# Patient Record
Sex: FEMALE | Race: WHITE | NOT HISPANIC OR LATINO | Employment: FULL TIME | URBAN - METROPOLITAN AREA
[De-identification: names, ages, dates, MRNs, and addresses within clinical notes are randomized per-mention and may not be internally consistent; named-entity substitution may affect disease eponyms.]

---

## 2018-11-19 ENCOUNTER — OFFICE VISIT (OUTPATIENT)
Dept: FAMILY MEDICINE | Facility: CLINIC | Age: 53
End: 2018-11-19
Payer: COMMERCIAL

## 2018-11-19 VITALS
BODY MASS INDEX: 32.3 KG/M2 | SYSTOLIC BLOOD PRESSURE: 112 MMHG | HEART RATE: 80 BPM | TEMPERATURE: 97.2 F | WEIGHT: 188.2 LBS | DIASTOLIC BLOOD PRESSURE: 82 MMHG

## 2018-11-19 DIAGNOSIS — G89.29 CHRONIC PAIN OF LEFT KNEE: Primary | ICD-10-CM

## 2018-11-19 DIAGNOSIS — L30.9 ECZEMA, UNSPECIFIED TYPE: ICD-10-CM

## 2018-11-19 DIAGNOSIS — Z87.74 S/P SURGERY FOR COMPLEX CONGENITAL HEART DISEASE: ICD-10-CM

## 2018-11-19 DIAGNOSIS — M25.562 CHRONIC PAIN OF LEFT KNEE: Primary | ICD-10-CM

## 2018-11-19 DIAGNOSIS — M79.89 LEG SWELLING: ICD-10-CM

## 2018-11-19 DIAGNOSIS — E05.90 HYPERTHYROIDISM: ICD-10-CM

## 2018-11-19 PROCEDURE — 36415 COLL VENOUS BLD VENIPUNCTURE: CPT | Performed by: PHYSICIAN ASSISTANT

## 2018-11-19 PROCEDURE — 84443 ASSAY THYROID STIM HORMONE: CPT | Performed by: PHYSICIAN ASSISTANT

## 2018-11-19 PROCEDURE — 85379 FIBRIN DEGRADATION QUANT: CPT | Performed by: PHYSICIAN ASSISTANT

## 2018-11-19 PROCEDURE — 99215 OFFICE O/P EST HI 40 MIN: CPT | Performed by: PHYSICIAN ASSISTANT

## 2018-11-19 RX ORDER — CLOBETASOL PROPIONATE 0.5 MG/G
OINTMENT TOPICAL
Qty: 45 G | Refills: 1 | Status: SHIPPED | OUTPATIENT
Start: 2018-11-19 | End: 2019-02-27

## 2018-11-19 NOTE — MR AVS SNAPSHOT
After Visit Summary   11/19/2018    Jesenia Fofana    MRN: [id number]           Patient Information     Date Of Birth          1965        Visit Information        Provider Department      11/19/2018 4:40 PM Aryan Mon PA-C [facility]        Today's Diagnoses     Chronic pain of left knee    -  1    Leg swelling        Hyperthyroidism        S/P surgery for complex congenital heart disease        Eczema, unspecified type           Follow-ups after your visit        Additional Services     CARDIOLOGY EVAL ADULT REFERRAL       Preferred location:  [facility] [phone]   [email or web address]    Please be aware that coverage of these services is subject to the terms and limitations of your health insurance plan.  Call member services at your health plan with any benefit or coverage questions.      Please bring the following to your appointment:  Any x-rays, CTs or MRIs which have been performed. Contact the facility where they were done to arrange for  prior to your scheduled appointment.    List of current medications  This referral request   Any documents/labs given to you for this referral            [facility] PT, HAND, AND CHIROPRACTIC REFERRAL       Physical Therapy, Hand Therapy and Chiropractic Care are available through:  *[facility]  *Hand Therapy (Occupational Therapy or Physical Therapy)  *[facility]    Call one number to schedule at any of the above locations: [phone].    Physical therapy, Hand therapy and/or Chiropractic care has been recommended by your physician as an excellent treatment option to reduce pain and help people return to normal activities, including sports.  Therapy and/or chiropractic care services are a great complement or alternative to expensive and invasive surgery, injections, or long-term use of prescription  medications. The primary goal is to identify the underlying problem and provide you the tools to manage your condition on your own.     Please be aware that coverage of these services is subject to the terms and limitations of your health insurance plan.  Call member services at your health plan with any benefit or coverage questions.      Please bring the following to your appointment:  *Your personal calendar for scheduling future appointments  *Comfortable clothing                  Follow-up notes from your care team     Return for at earliest convenience for your physical.      Future tests that were ordered for you today     Open Future Orders        Priority Expected Expires Ordered    [facility] PT, HAND, AND CHIROPRACTIC REFERRAL Routine  11/19/2019 11/19/2018            Who to contact     If you have questions or need follow up information about today's clinic visit or your schedule please contact [facility] directly at [phone].  Normal or non-critical lab and imaging results will be communicated to you by Luluhart, letter or phone within 4 business days after the clinic has received the results. If you do not hear from us within 7 days, please contact the clinic through Luluhart or phone. If you have a critical or abnormal lab result, we will notify you by phone as soon as possible.  Submit refill requests through 23press or call your pharmacy and they will forward the refill request to us. Please allow 3 business days for your refill to be completed.          Additional Information About Your Visit        23press Information     23press gives you secure access to your electronic health record. If you see a primary care provider, you can also send messages to your care team and make appointments. If you have questions, please call your primary care clinic.  If you do not have a primary care provider, please call [phone] and they will assist you.        Care EveryWhere ID     This is  your Care EveryWhere ID. This could be used by other organizations to access your [place] medical records  [id number]        Your Vitals Were     Pulse Temperature Last Period BMI (Body Mass Index)          80 97.2  F (36.2  C) (Tympanic) 04/25/2011 32.3 kg/m2         Blood Pressure from Last 3 Encounters:   11/19/18 112/82   07/31/15 110/70   05/06/11 94/59    Weight from Last 3 Encounters:   11/19/18 188 lb 3.2 oz (85.4 kg)   07/31/15 173 lb (78.5 kg)   05/06/11 158 lb 9.6 oz (71.9 kg)              We Performed the Following     CARDIOLOGY EVAL ADULT REFERRAL     D dimer, quantitative     TSH with free T4 reflex          Today's Medication Changes          These changes are accurate as of 11/19/18  5:20 PM.  If you have any questions, ask your nurse or doctor.               Start taking these medicines.        Dose/Directions    clobetasol 0.05 % ointment   Commonly known as:  TEMOVATE   Used for:  Eczema, unspecified type   Started by:  Aryan Mon PA-C        Apply sparingly to affected area twice daily as needed.  Do not apply to face.   Quantity:  45 g   Refills:  1            Where to get your medicines      These medications were sent to [place] [address] [place] [address]  [address] [facility] [address]     Phone:  [phone]     clobetasol 0.05 % ointment                Primary Care Provider Office Phone # Fax #    Alka Lai [phone]       [address] [place] [address] [address]  [facility] [address]        Equal Access to Services     [facility] : Hadii jett edwards Sojohnny, waaxda luqadaha, qaybta kaalmastefanie pham. So [facility] [phone].    ATENCIÓN: Si habla español, tiene a bardley disposición servicios gratuitos de asistencia lingüística. Llame al [phone].    We comply with applicable federal civil rights laws and [place] laws. We do not discriminate on the basis of race, color, national origin, age,  disability, sex, sexual orientation, or gender identity.            Thank you!     Thank you for choosing [facility]  for your care. Our goal is always to provide you with excellent care. Hearing back from our patients is one way we can continue to improve our services. Please take a few minutes to complete the written survey that you may receive in the mail after your visit with us. Thank you!             Your Updated Medication List - Protect others around you: Learn how to safely use, store and throw away your medicines at www.disposemymeds.org.          This list is accurate as of 11/19/18  5:20 PM.  Always use your most recent med list.                   Brand Name Dispense Instructions for use Diagnosis    albuterol 108 (90 Base) MCG/ACT inhaler    PROAIR HFA/PROVENTIL HFA/VENTOLIN HFA    1 Inhaler    Inhale 2 puffs into the lungs every 6 hours as needed for shortness of breath / dyspnea or wheezing    Cough, Acute bronchitis       azithromycin 250 MG tablet    ZITHROMAX    6 tablet    Two tablets first day, then one tablet daily for four days.    Cough, Acute bronchitis       clobetasol 0.05 % ointment    TEMOVATE    45 g    Apply sparingly to affected area twice daily as needed.  Do not apply to face.    Eczema, unspecified type       guaiFENesin-codeine 100-10 MG/5ML Soln solution    ROBITUSSIN AC    120 mL    Take 10 mLs by mouth every evening as needed for cough    Cough

## 2018-11-19 NOTE — PROGRESS NOTES
SUBJECTIVE:   Jesenia Fofana is a 53 year old female who presents to clinic today for the following health issues:      Joint Pain    Onset: last year    Description:   Location: left knee  Character: Dull ache    Intensity: mild, moderate    Progression of Symptoms: worse    Accompanying Signs & Symptoms:  Other symptoms: radiation of pain to all around knee and swelling    History:   Previous similar pain: YES      Precipitating factors:   Trauma or overuse: YES    Alleviating factors:  Improved by: nothing    Therapies Tried and outcome: Benadryl, Tylenol        Knee pain:Jesenia presents to the clinic today for evaluation of left knee pain which has been chronic since adolescence, with recent worsening over the past 1 month. Pain is located along the medial aspect of her knee and is worse with walking and exercise.  No known injury.  She feels that her knee is swelling from time to time.      Leg swelling: Left ankle swelling x 2 weeks with some associated pain in left ankle.  She has a hx of LE edema when she had an episode of thyroid storm in the past. No injury to her LE.  No fevers, SOB.  + recent travel on an airplane three weeks ago, no estrogen use.    Hyperthyroidism:  Hx of graves disease complicated by an episode of thyroid storm three years ago.  Has been untreated and has not seen endocrinology since that time.  She tells me that she has had a 25 lb weight gain over the past year and is concerned for her thyroid.  No palpitations, weight loss, or anxiety.     Hx of heart defect:  Congenital absence of left coronary artery.  S/p OHS to repair this 5 years ago, has not been following with cardiology, would like referral today    Eczema:  Chronic intermittent flexural eczema, previously treated with clobetasol cream.  She would like refill today    Problem list and histories reviewed & adjusted, as indicated.  Additional history: as documented    Patient Active Problem List   Diagnosis      Hyperthyroidism     CARDIOVASCULAR SCREENING; LDL GOAL LESS THAN 100     Hyperlipidemia LDL goal <100     Health Care Home     Past Surgical History:   Procedure Laterality Date     C NONSPECIFIC PROCEDURE  1997    left coronary artery revision       Social History   Substance Use Topics     Smoking status: Former Smoker     Types: Cigarettes     Quit date: 7/8/1991     Smokeless tobacco: Never Used     Alcohol use 0.0 oz/week     0 Standard drinks or equivalent per week      Comment: socially     Family History   Problem Relation Age of Onset     HEART DISEASE Maternal Grandfather      MI     Cancer - colorectal Paternal Grandfather      Diabetes Maternal Uncle      Thyroid Disease Sister      hypothyroidism     Neurologic Disorder Sister      MS diagnosed at 40y.o.         Current Outpatient Prescriptions   Medication Sig Dispense Refill     clobetasol (TEMOVATE) 0.05 % ointment Apply sparingly to affected area twice daily as needed.  Do not apply to face. 45 g 1     albuterol (PROAIR HFA, PROVENTIL HFA, VENTOLIN HFA) 108 (90 BASE) MCG/ACT inhaler Inhale 2 puffs into the lungs every 6 hours as needed for shortness of breath / dyspnea or wheezing (Patient not taking: Reported on 11/19/2018) 1 Inhaler 0     azithromycin (ZITHROMAX) 250 MG tablet Two tablets first day, then one tablet daily for four days. (Patient not taking: Reported on 11/19/2018) 6 tablet 0     guaiFENesin-codeine (ROBITUSSIN AC) 100-10 MG/5ML SOLN Take 10 mLs by mouth every evening as needed for cough (Patient not taking: Reported on 11/19/2018) 120 mL 0     Allergies   Allergen Reactions     Aspirin Hives       Reviewed and updated as needed this visit by clinical staff       Reviewed and updated as needed this visit by Provider         ROS:  Constitutional, HEENT, cardiovascular, pulmonary, GI, , musculoskeletal, neuro, skin, endocrine and psych systems are negative, except as otherwise noted.    OBJECTIVE:     /82 (BP Location: Right  arm, Patient Position: Chair, Cuff Size: Adult Regular)  Pulse 80  Temp 97.2  F (36.2  C) (Tympanic)  Wt 188 lb 3.2 oz (85.4 kg)  LMP 04/25/2011  BMI 32.3 kg/m2  Body mass index is 32.3 kg/(m^2).  GENERAL: healthy, alert and no distress  HENT: ear canals and TM's normal, nose and mouth without ulcers or lesions  NECK: no adenopathy, no asymmetry, masses, or scars and thyroid normal to palpation  RESP: lungs clear to auscultation - no rales, rhonchi or wheezes  CV: regular rate and rhythm, normal S1 S2, no S3 or S4, no murmur, click or rub, left LE with focal edema noted at left medial ankle with associated TTP, FROM of LE bilaterally, pedal pulses 2+ bilaterally  MS:  Mild palpable effusion noted to left knee, TTP along medial joint line, no niraj TTP, FROM of left knee, negative varus and valgus stress test of left knee, 5/5 strength of left knee  SKIN: no suspicious lesions or rashes  NEURO: Normal strength and tone, mentation intact and speech normal  PSYCH: mentation appears normal, affect normal/bright    Diagnostic Test Results:  none     ASSESSMENT/PLAN:       1. Chronic pain of left knee    Knee pain likely secondary to chronic DJD, possible medial meniscus tear. Advise that she use tylenol for pain and begin PT.  If no improvement in 4-6 weeks, may consider advanced imaging  - [facility] PT, HAND, AND CHIROPRACTIC REFERRAL; Future    2. Leg swelling  Focal edema and TTP along left medial ankle of unclear etiology. This may be dependent swelling secondary to venous insufficiency and /or knee swelling, but due to acute onset and pain, will start with D dimer to assess  - D dimer, quantitative    3. Hyperthyroidism  I am very concerned about Jesenia's hx of hyperthyroidism that hs been untreated.  I have strongly encouraged her to follow up with her endocrinologist often.  Today we discussed the nature of graves disease, possible treatment as well as SE if left untreated.  She has not evidence of acute thyroid  storm today.  TSH level pending  - TSH with free T4 reflex    4. S/P surgery for complex congenital heart disease  Strongly encouraged yearly follow up with cardiology  - CARDIOLOGY EVAL ADULT REFERRAL    5. Eczema, unspecified type  - clobetasol (TEMOVATE) 0.05 % ointment; Apply sparingly to affected area twice daily as needed.  Do not apply to face.  Dispense: 45 g; Refill: 1    See Patient Instructions    Aryan Mon PA-C  [facility]

## 2018-11-20 ENCOUNTER — THERAPY VISIT (OUTPATIENT)
Dept: PHYSICAL THERAPY | Facility: CLINIC | Age: 53
End: 2018-11-20
Payer: COMMERCIAL

## 2018-11-20 DIAGNOSIS — M25.562 CHRONIC PAIN OF LEFT KNEE: ICD-10-CM

## 2018-11-20 DIAGNOSIS — Q24.5 CORONARY ARTERY ANOMALY, CONGENITAL: Primary | ICD-10-CM

## 2018-11-20 DIAGNOSIS — G89.29 CHRONIC PAIN OF LEFT KNEE: ICD-10-CM

## 2018-11-20 DIAGNOSIS — M25.562 ACUTE PAIN OF LEFT KNEE: Primary | ICD-10-CM

## 2018-11-20 LAB
D DIMER PPP FEU-MCNC: 0.2 UG/ML FEU (ref 0–0.5)
TSH SERPL DL<=0.005 MIU/L-ACNC: 3.98 MU/L (ref 0.4–4)

## 2018-11-20 PROCEDURE — 97110 THERAPEUTIC EXERCISES: CPT | Mod: GP

## 2018-11-20 PROCEDURE — 97161 PT EVAL LOW COMPLEX 20 MIN: CPT | Mod: GP

## 2018-11-20 ASSESSMENT — ACTIVITIES OF DAILY LIVING (ADL)
STIFFNESS: THE SYMPTOM AFFECTS MY ACTIVITY MODERATELY
HOW_WOULD_YOU_RATE_THE_OVERALL_FUNCTION_OF_YOUR_KNEE_DURING_YOUR_USUAL_DAILY_ACTIVITIES?: ABNORMAL
STAND: ACTIVITY IS MINIMALLY DIFFICULT
KNEEL ON THE FRONT OF YOUR KNEE: ACTIVITY IS FAIRLY DIFFICULT
LIMPING: THE SYMPTOM AFFECTS MY ACTIVITY MODERATELY
KNEE_ACTIVITY_OF_DAILY_LIVING_SCORE: 60
RAW_SCORE: 42
GO DOWN STAIRS: ACTIVITY IS MINIMALLY DIFFICULT
GO UP STAIRS: ACTIVITY IS FAIRLY DIFFICULT
RISE FROM A CHAIR: ACTIVITY IS NOT DIFFICULT
PAIN: THE SYMPTOM AFFECTS MY ACTIVITY MODERATELY
SIT WITH YOUR KNEE BENT: ACTIVITY IS NOT DIFFICULT
SQUAT: ACTIVITY IS VERY DIFFICULT
WEAKNESS: I HAVE THE SYMPTOM BUT IT DOES NOT AFFECT MY ACTIVITY
SWELLING: THE SYMPTOM AFFECTS MY ACTIVITY MODERATELY
GIVING WAY, BUCKLING OR SHIFTING OF KNEE: I HAVE THE SYMPTOM BUT IT DOES NOT AFFECT MY ACTIVITY
AS_A_RESULT_OF_YOUR_KNEE_INJURY,_HOW_WOULD_YOU_RATE_YOUR_CURRENT_LEVEL_OF_DAILY_ACTIVITY?: ABNORMAL
HOW_WOULD_YOU_RATE_THE_CURRENT_FUNCTION_OF_YOUR_KNEE_DURING_YOUR_USUAL_DAILY_ACTIVITIES_ON_A_SCALE_FROM_0_TO_100_WITH_100_BEING_YOUR_LEVEL_OF_KNEE_FUNCTION_PRIOR_TO_YOUR_INJURY_AND_0_BEING_THE_INABILITY_TO_PERFORM_ANY_OF_YOUR_USUAL_DAILY_ACTIVITIES?: 45
KNEE_ACTIVITY_OF_DAILY_LIVING_SUM: 42
WALK: ACTIVITY IS SOMEWHAT DIFFICULT

## 2018-11-20 NOTE — MR AVS SNAPSHOT
After Visit Summary   11/20/2018    Jesenia Fofana    MRN: [id number]           Patient Information     Date Of Birth          1965        Visit Information        Provider Department      11/20/2018 2:10 PM Saran Mosqueda PT [facility]        Today's Diagnoses     Acute pain of left knee    -  1    Chronic pain of left knee           Follow-ups after your visit        Future tests that were ordered for you today     Open Future Orders        Priority Expected Expires Ordered    Echocardiogram Complete Routine 1/10/2019 11/20/2019 11/20/2018    CBC with platelets differential Routine 1/10/2019 11/20/2019 11/20/2018    Comprehensive metabolic panel Routine 1/10/2019 11/20/2019 11/20/2018    Lipid panel reflex to direct LDL Fasting Routine 1/10/2019 11/20/2019 11/20/2018            Who to contact     If you have questions or need follow up information about today's clinic visit or your schedule please contact [facility] directly at [phone].  Normal or non-critical lab and imaging results will be communicated to you by [employer] Beijinghart, letter or phone within 4 business days after the clinic has received the results. If you do not hear from us within 7 days, please contact the clinic through [employer] or phone. If you have a critical or abnormal lab result, we will notify you by phone as soon as possible.  Submit refill requests through Napatech or call your pharmacy and they will forward the refill request to us. Please allow 3 business days for your refill to be completed.          Additional Information About Your Visit        MyChart Information     [employer] gives you secure access to your electronic health record. If you see a primary care provider, you can also send messages to your care team and make appointments. If you have questions, please call your primary care clinic.  If you do not have a  primary care provider, please call [phone] and they will assist you.        Care EveryWhere ID     This is your Care EveryWhere ID. This could be used by other organizations to access your [place] medical records  [id number]        Your Vitals Were     Last Period                   04/25/2011            Blood Pressure from Last 3 Encounters:   11/19/18 112/82   07/31/15 110/70   05/06/11 94/59    Weight from Last 3 Encounters:   11/19/18 85.4 kg (188 lb 3.2 oz)   07/31/15 78.5 kg (173 lb)   05/06/11 71.9 kg (158 lb 9.6 oz)              We Performed the Following     HC PT EVAL, LOW COMPLEXITY     [facility] PT, HAND, AND CHIROPRACTIC REFERRAL     THERAPEUTIC EXERCISES        Primary Care Provider Office Phone # Fax #    Alka Lai [phone]       [address] [facility] [address]  [facility] [address]        Equal Access to Services     CLAYTON CASTRO : Hadii aad ku hadasho Soomaali, waaxda luqadaha, qaybta kaalmada adeegyada, waxay danielain haycarln thelma raza . So [facility] [phone].    ATENCIÓN: Si jose wade, tiene a bradley disposición servicios gratuitos de asistencia lingüística. Llame al [phone].    We comply with applicable federal civil rights laws and [place] laws. We do not discriminate on the basis of race, color, national origin, age, disability, sex, sexual orientation, or gender identity.            Thank you!     Thank you for choosing [facility]  for your care. Our goal is always to provide you with excellent care. Hearing back from our patients is one way we can continue to improve our services. Please take a few minutes to complete the written survey that you may receive in the mail after your visit with us. Thank you!             Your Updated Medication List - Protect others around you: Learn how to safely use, store and throw away your medicines at www.disposemymeds.org.          This list is accurate as of 11/20/18  2:52  PM.  Always use your most recent med list.                   Brand Name Dispense Instructions for use Diagnosis    albuterol 108 (90 Base) MCG/ACT inhaler    PROAIR HFA/PROVENTIL HFA/VENTOLIN HFA    1 Inhaler    Inhale 2 puffs into the lungs every 6 hours as needed for shortness of breath / dyspnea or wheezing    Cough, Acute bronchitis       azithromycin 250 MG tablet    ZITHROMAX    6 tablet    Two tablets first day, then one tablet daily for four days.    Cough, Acute bronchitis       clobetasol 0.05 % ointment    TEMOVATE    45 g    Apply sparingly to affected area twice daily as needed.  Do not apply to face.    Eczema, unspecified type       guaiFENesin-codeine 100-10 MG/5ML Soln solution    ROBITUSSIN AC    120 mL    Take 10 mLs by mouth every evening as needed for cough    Cough

## 2018-11-21 NOTE — PROGRESS NOTES
[facility] Initial Evaluation  Subjective:  Patient is a 53 year old female presenting with rehab left ankle/foot hpi.                                      Pertinent medical history includes:  Overweight, menopausal and thyroid problems.  Medical allergies: yes (aspirin ).  Other surgeries include:  Heart surgery (1996 ).  Current medications:  Other (tylenol ).      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                   Knee Activity of Daily Living Score: 60            Objective:  System    Physical Exam    General     ROS    Assessment/Plan:

## 2018-11-21 NOTE — PROGRESS NOTES
Jesenia-  Here are your recent results. They are normal.  If you have any questions please do not hesitate to contact our office via phone  ([phone]) or through delicioust.

## 2018-12-14 ENCOUNTER — THERAPY VISIT (OUTPATIENT)
Dept: PHYSICAL THERAPY | Facility: CLINIC | Age: 53
End: 2018-12-14
Payer: COMMERCIAL

## 2018-12-14 DIAGNOSIS — M25.562 ACUTE PAIN OF LEFT KNEE: ICD-10-CM

## 2018-12-14 PROCEDURE — 97530 THERAPEUTIC ACTIVITIES: CPT | Mod: GP

## 2018-12-14 PROCEDURE — 97110 THERAPEUTIC EXERCISES: CPT | Mod: GP

## 2019-01-10 ENCOUNTER — HOSPITAL ENCOUNTER (OUTPATIENT)
Dept: CARDIOLOGY | Facility: CLINIC | Age: 54
Discharge: HOME | End: 2019-01-10
Attending: INTERNAL MEDICINE | Admitting: INTERNAL MEDICINE
Payer: COMMERCIAL

## 2019-01-10 DIAGNOSIS — Q24.5 CORONARY ARTERY ANOMALY, CONGENITAL: ICD-10-CM

## 2019-01-10 LAB
ALBUMIN SERPL-MCNC: 4 G/DL (ref 3.4–5)
ALP SERPL-CCNC: 72 U/L (ref 40–150)
ALT SERPL W P-5'-P-CCNC: 31 U/L (ref 0–50)
ANION GAP SERPL CALCULATED.3IONS-SCNC: 9 MMOL/L (ref 3–14)
AST SERPL W P-5'-P-CCNC: 17 U/L (ref 0–45)
BASOPHILS # BLD AUTO: 0.1 10E9/L (ref 0–0.2)
BASOPHILS NFR BLD AUTO: 0.8 %
BILIRUB SERPL-MCNC: 0.5 MG/DL (ref 0.2–1.3)
BUN SERPL-MCNC: 7 MG/DL (ref 7–30)
CALCIUM SERPL-MCNC: 8.8 MG/DL (ref 8.5–10.1)
CHLORIDE SERPL-SCNC: 105 MMOL/L (ref 94–109)
CHOLEST SERPL-MCNC: 239 MG/DL
CO2 SERPL-SCNC: 26 MMOL/L (ref 20–32)
CREAT SERPL-MCNC: 0.61 MG/DL (ref 0.52–1.04)
DIFFERENTIAL METHOD BLD: NORMAL
EOSINOPHIL # BLD AUTO: 0.3 10E9/L (ref 0–0.7)
EOSINOPHIL NFR BLD AUTO: 2.7 %
ERYTHROCYTE [DISTWIDTH] IN BLOOD BY AUTOMATED COUNT: 11.8 % (ref 10–15)
GFR SERPL CREATININE-BSD FRML MDRD: >90 ML/MIN/{1.73_M2}
GLUCOSE SERPL-MCNC: 92 MG/DL (ref 70–99)
HCT VFR BLD AUTO: 42.3 % (ref 35–47)
HDLC SERPL-MCNC: 38 MG/DL
HGB BLD-MCNC: 14.7 G/DL (ref 11.7–15.7)
IMM GRANULOCYTES # BLD: 0 10E9/L (ref 0–0.4)
IMM GRANULOCYTES NFR BLD: 0.3 %
LDLC SERPL CALC-MCNC: 140 MG/DL
LYMPHOCYTES # BLD AUTO: 2.5 10E9/L (ref 0.8–5.3)
LYMPHOCYTES NFR BLD AUTO: 27.2 %
MCH RBC QN AUTO: 30.4 PG (ref 26.5–33)
MCHC RBC AUTO-ENTMCNC: 34.8 G/DL (ref 31.5–36.5)
MCV RBC AUTO: 87 FL (ref 78–100)
MONOCYTES # BLD AUTO: 0.5 10E9/L (ref 0–1.3)
MONOCYTES NFR BLD AUTO: 5.7 %
NEUTROPHILS # BLD AUTO: 5.9 10E9/L (ref 1.6–8.3)
NEUTROPHILS NFR BLD AUTO: 63.3 %
NONHDLC SERPL-MCNC: 201 MG/DL
NRBC # BLD AUTO: 0 10*3/UL
NRBC BLD AUTO-RTO: 0 /100
PLATELET # BLD AUTO: 337 10E9/L (ref 150–450)
POTASSIUM SERPL-SCNC: 4.3 MMOL/L (ref 3.4–5.3)
PROT SERPL-MCNC: 7.7 G/DL (ref 6.8–8.8)
RBC # BLD AUTO: 4.84 10E12/L (ref 3.8–5.2)
SODIUM SERPL-SCNC: 140 MMOL/L (ref 133–144)
TRIGL SERPL-MCNC: 306 MG/DL
WBC # BLD AUTO: 9.3 10E9/L (ref 4–11)

## 2019-01-10 PROCEDURE — 93306 TTE W/DOPPLER COMPLETE: CPT

## 2019-01-10 PROCEDURE — 85025 COMPLETE CBC W/AUTO DIFF WBC: CPT | Performed by: INTERNAL MEDICINE

## 2019-01-10 PROCEDURE — 80061 LIPID PANEL: CPT | Performed by: INTERNAL MEDICINE

## 2019-01-10 PROCEDURE — 80053 COMPREHEN METABOLIC PANEL: CPT | Performed by: INTERNAL MEDICINE

## 2019-01-10 PROCEDURE — 36415 COLL VENOUS BLD VENIPUNCTURE: CPT | Performed by: INTERNAL MEDICINE

## 2019-01-17 ENCOUNTER — OFFICE VISIT (OUTPATIENT)
Dept: CARDIOLOGY | Facility: CLINIC | Age: 54
End: 2019-01-17
Attending: PHYSICIAN ASSISTANT
Payer: COMMERCIAL

## 2019-01-17 VITALS
HEART RATE: 76 BPM | DIASTOLIC BLOOD PRESSURE: 84 MMHG | BODY MASS INDEX: 31.19 KG/M2 | SYSTOLIC BLOOD PRESSURE: 122 MMHG | WEIGHT: 187.2 LBS | HEIGHT: 65 IN

## 2019-01-17 DIAGNOSIS — Q24.5 ALCAPA (ANOMALOUS LEFT CORONARY ARTERY FROM THE PULMONARY ARTERY): Primary | ICD-10-CM

## 2019-01-17 DIAGNOSIS — E78.5 HYPERLIPIDEMIA LDL GOAL <100: ICD-10-CM

## 2019-01-17 PROCEDURE — 99203 OFFICE O/P NEW LOW 30 MIN: CPT | Performed by: INTERNAL MEDICINE

## 2019-01-17 RX ORDER — ATORVASTATIN CALCIUM 20 MG/1
20 TABLET, FILM COATED ORAL DAILY
Qty: 90 TABLET | Refills: 3 | Status: SHIPPED | OUTPATIENT
Start: 2019-01-17 | End: 2020-01-09

## 2019-01-17 ASSESSMENT — MIFFLIN-ST. JEOR: SCORE: 1437.07

## 2019-01-17 NOTE — LETTER
1/17/2019      Aryan Mon PA-C  [address] [facility] [place] [address]      RE: Jesenia Fofana       Dear Colleague,    I had the pleasure of seeing Jesenia Fofana in the [facility].    Service Date: 01/17/2019      HISTORY OF PRESENT ILLNESS:  Ms. 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  This was apparently discovered in 1996 when she was pregnant with her first child.  They heard a murmur, and she said that she had had a murmur her whole life, but the OB/GYN rightfully wanted to know the etiology and sent her for an echo, and that led to ultimately the diagnosis.  At the time she was pregnant.  She actually continued with that pregnancy without any issues and underwent surgery 06/1996 three months after delivery with Dr. Norris at [facility].  At this point, we do not have that operative report.  She reports that she has always had regional wall motion abnormalities since the diagnosis was made and it has not really changed.  She reports that she had a LIMA to the LAD but does not know any other details.  She does have a family history of stroke in her mom at age 79.  She also has history of congenital heart disease.  Her older brother apparently had what sounds like an ASD device in his adulthood.  Her sister has a pacemaker.  She has 2 children, ages 23 and 20.  She smoked 2 packs a day for 6 years and quit at age 22.  She is .  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      She was last seen in 2010 by Dr. Haskins.  At that time she had recently had a thyroid storm and that was quite symptomatic.  She at this point, obviously, has that treated.  She did undergo cholesterol today.  Her LDL was 140 with an HDL of 38.  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  She did gym, track, dance throughout her childhood and felt she could keep up with her peers without issues.  At times she would notice some left chest and side pain when she was a kid, but he was always told it was gas.  She is otherwise healthy.  She has a little bit of shortness of breath going up 3 flights but otherwise no chest pain.  She has an atypical chest pain that she kind of describes only when she lies down and it is kind of a sparkly sensation that lasts a couple of seconds.  She has also had some blood vessels in her chest, some spider veins and was hoping that they were not related to her underlying heart issues.  She is currently not on any cardiac medications.      IMPRESSION, REPORT, PLAN:   1.  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  Subsequent surgery 06/1996 by Dr. Norris apparently at [facility].  We do not have this operative report.   2.  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   3.  Hyperlipidemia.   4.  History of thyrotoxicosis.   5.  Atypical chest pain.      DISCUSSION:  It was a pleasure to see Ms. Fofana in Cardiology Clinic.  Today I discussed her history and reviewed her testing and listened to her symptoms in detail.  We discussed that her chest pain is a bit atypical, but I do think it is worth getting a more further evaluation and will plan to do a coronary CTA.  This is to see where her coronary anatomy lies at this point, as there is nothing in the system.  We will also work on getting her operative report from Dr. Norris to see exactly what he did and will also do an exercise nuclear stress test.  I chose nuclear, obviously, given the fact that she has some baseline regional wall motion abnormalities, and I then will plan to see her back.  She did agree to start a small dose of a cholesterol medicine, just Lipitor 20,  as she is at higher risk given the family history and nicotine dependence, which she agrees to.  We will plan to check her cholesterol when she comes back as well.  It was a pleasure to see her today.  Please do not hesitate to contact me with any questions or concerns.         MARCIAL WILSON MD             D: 2019   T: 2019   MT: CARL      Name:     MEDINA PIZARRO   MRN:      [id number]        Account:      [id number]   :      1965           Service Date: 2019      Document: [id number]         Outpatient Encounter Medications as of 2019   Medication Sig Dispense Refill     atorvastatin (LIPITOR) 20 MG tablet Take 1 tablet (20 mg) by mouth daily 90 tablet 3     clobetasol (TEMOVATE) 0.05 % ointment Apply sparingly to affected area twice daily as needed.  Do not apply to face. 45 g 1     [DISCONTINUED] albuterol (PROAIR HFA, PROVENTIL HFA, VENTOLIN HFA) 108 (90 BASE) MCG/ACT inhaler Inhale 2 puffs into the lungs every 6 hours as needed for shortness of breath / dyspnea or wheezing (Patient not taking: Reported on 2019) 1 Inhaler 0     [DISCONTINUED] azithromycin (ZITHROMAX) 250 MG tablet Two tablets first day, then one tablet daily for four days. (Patient not taking: Reported on 2018) 6 tablet 0     [DISCONTINUED] guaiFENesin-codeine (ROBITUSSIN AC) 100-10 MG/5ML SOLN Take 10 mLs by mouth every evening as needed for cough (Patient not taking: Reported on 2018) 120 mL 0     No facility-administered encounter medications on file as of 2019.        Again, thank you for allowing me to participate in the care of your patient.      Sincerely,    Marcial Wilson MD     [facility]

## 2019-01-17 NOTE — NURSING NOTE
Chief Complaint   Patient presents with     Heart Problem        Cardiac Testing: Patient given instructions regarding  CTA cors and NM stress test. Discussed purpose, preparation, procedure and when to expect results reported back to the patient. Patient demonstrated understanding of this information and agreed to call with further questions or concerns.  Labs: Patient was given results of the laboratory testing obtained today. Patient was instructed to return for the next laboratory testing in 1 month . Patient demonstrated understanding of this information and agreed to call with further questions or concerns.   Med Reconcile: Reviewed and verified all current medications with the patient. The updated medication list was printed and given to the patient.  New Medication: Patient was educated regarding newly prescribed medication, including discussion of  the indication, administration, side effects, and when to report to MD or RN. Patient demonstrated understanding of this information and agreed to call with further questions or concerns.  Return Appointment: Patient given instructions regarding scheduling next clinic visit. Patient demonstrated understanding of this information and agreed to call with further questions or concerns.  Patient stated she understood all health information given and agreed to call with further questions or concerns.     Aubrie Garcia RN, BSN  Cardiology Care Coordinator  [facility]  [email or web address]  [phone]

## 2019-01-17 NOTE — PATIENT INSTRUCTIONS
"You were seen today in the Adult Congenital and Cardiovascular Genetics Clinic at the [facility].    Cardiology Providers you saw during your visit:  Dr. Genaro Wilson     Diagnosis:  ALCAPA    Results:  The results of your labs and echo were discussed with you today    Recommendations:    1.  Continue to eat a heart healthy, low salt diet.  2.  Continue to get 20-30 minutes of aerobic activity, 4-5 days per week.  Examples of aerobic activity include walking, running, swimming, cycling, etc.  3.  Continue to observe good oral hygiene, with regular dental visits.  4.  Please have a coronary CT angiogram.  5. Please have a stress test.  6. Please start Lipitor 20 mg daily in the evening - please call if you do not tolerate this medication.      Vitals:    01/17/19 0916   BP: 122/84   Pulse: 76   Weight: 84.9 kg (187 lb 3.2 oz)   Height: 1.638 m (5' 4.5\")     Exercise restrictions:   Yes__X__  No____         If yes, list restrictions:  Must be allowed to rest if fatigued or SOB      Work restrictions:  Yes____  No__X__         If yes, list restrictions:    FASTING CHOLESTEROL was checked in the last 5 years YES__X_  NO___ 2018  Continue to eat a heart healthy, low salt diet.         ____ Fasting lipid panel order today         ____ No changes in medications          ____ I recommend the following changes in your cholesterol medications.:          ____ Please follow up for cholesterol screening at your primary care physician      Follow-up:  Follow up with Dr. Wilson in 1 month to review your results and have lipid panel. (February 14th)    If you have questions or concerns please contact us at:    Aubrie Garcia, RN, BSN   Kamaljit Atkins (Scheduling)  Nurse Coordinator     Clinic   Adult Congenital and CV Genetics Adult Congenital and CV Genetic  [facility]  (P)[phone]    (P) " [phone]  [email or web address] (f)[phone]        For after hours urgent needs, call [phone] and ask to speak to the Adult Congenital Physician on call.  Mention Job Code 0401.    For emergencies call 911.    [facility]  [facility]  Mail Code [id number]  [address] [place]  [address]

## 2019-01-17 NOTE — PROGRESS NOTES
Service Date: 01/17/2019      HISTORY OF PRESENT ILLNESS:  Ms. 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  This was apparently discovered in 1996 when she was pregnant with her first child.  They heard a murmur, and she said that she had had a murmur her whole life, but the OB/GYN rightfully wanted to know the etiology and sent her for an echo, and that led to ultimately the diagnosis.  At the time she was pregnant.  She actually continued with that pregnancy without any issues and underwent surgery 06/1996 three months after delivery with Dr. Norris at [facility].  At this point, we do not have that operative report.  She reports that she has always had regional wall motion abnormalities since the diagnosis was made and it has not really changed.  She reports that she had a LIMA to the LAD but does not know any other details.  She does have a family history of stroke in her mom at age 79.  She also has history of congenital heart disease.  Her older brother apparently had what sounds like an ASD device in his adulthood.  Her sister has a pacemaker.  She has 2 children, ages 23 and 20.  She smoked 2 packs a day for 6 years and quit at age 22.  She is .  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      She was last seen in 2010 by Dr. Haskins.  At that time she had recently had a thyroid storm and that was quite symptomatic.  She at this point, obviously, has that treated.  She did undergo cholesterol today.  Her LDL was 140 with an HDL of 38.  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  She did gym, track, dance throughout her childhood and felt she could keep up with her peers without issues.  At times she would notice some left chest and side pain when she was a kid,  but he was always told it was gas.  She is otherwise healthy.  She has a little bit of shortness of breath going up 3 flights but otherwise no chest pain.  She has an atypical chest pain that she kind of describes only when she lies down and it is kind of a sparkly sensation that lasts a couple of seconds.  She has also had some blood vessels in her chest, some spider veins and was hoping that they were not related to her underlying heart issues.  She is currently not on any cardiac medications.      IMPRESSION, REPORT, PLAN:   1.  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  Subsequent surgery 06/1996 by Dr. Norris apparently at [facility].  We do not have this operative report.   2.  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   3.  Hyperlipidemia.   4.  History of thyrotoxicosis.   5.  Atypical chest pain.      DISCUSSION:  It was a pleasure to see Ms. Fofana in Cardiology Clinic.  Today I discussed her history and reviewed her testing and listened to her symptoms in detail.  We discussed that her chest pain is a bit atypical, but I do think it is worth getting a more further evaluation and will plan to do a coronary CTA.  This is to see where her coronary anatomy lies at this point, as there is nothing in the system.  We will also work on getting her operative report from Dr. Norris to see exactly what he did and will also do an exercise nuclear stress test.  I chose nuclear, obviously, given the fact that she has some baseline regional wall motion abnormalities, and I then will plan to see her back.  She did agree to start a small dose of a cholesterol medicine, just Lipitor 20, as she is at higher risk given the family history and nicotine dependence, which she agrees to.  We will plan to check her cholesterol when she comes back as well.  It was a pleasure to see her today.  Please do not hesitate to contact  me with any questions or concerns.         MARCIAL CRAIG MD             D: 2019   T: 2019   MT: CARL      Name:     MEDINA PIZARRO   MRN:      [id number]        Account:      [id number]   :      1965           Service Date: 2019      Document: [id number]

## 2019-01-17 NOTE — LETTER
2019     Clinic MD Nilay  No address on file    RE: Jesenia Fofana       Dear Colleague,    I had the pleasure of seeing Jesenia Fofana in the [facility].    HPI:     Please see dictated note    PAST MEDICAL HISTORY:  Past Medical History:   Diagnosis Date     Grave's disease 2010     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    abnl left coronary artery defect       CURRENT MEDICATIONS:  Current Outpatient Medications   Medication Sig Dispense Refill     clobetasol (TEMOVATE) 0.05 % ointment Apply sparingly to affected area twice daily as needed.  Do not apply to face. 45 g 1       PAST SURGICAL HISTORY:  Past Surgical History:   Procedure Laterality Date     C NONSPECIFIC PROCEDURE      left coronary artery revision       ALLERGIES     Allergies   Allergen Reactions     Aspirin Hives       FAMILY HISTORY:  Family History   Problem Relation Age of Onset     Heart Disease Maternal Grandfather         MI     Cancer - colorectal Paternal Grandfather      Diabetes Maternal Uncle      Thyroid Disease Sister         hypothyroidism     Neurologic Disorder Sister         MS diagnosed at 40y.o.       SOCIAL HISTORY:  Social History     Socioeconomic History     Marital status:      Spouse name: Adolfo     Number of children: 2     Years of education: 14 years     Highest education level: None   Social Needs     Financial resource strain: None     Food insecurity - worry: None     Food insecurity - inability: None     Transportation needs - medical: None     Transportation needs - non-medical: None   Occupational History     Occupation: management     Employer: [employer]     Comment: [employer]   Tobacco Use     Smoking status: Former Smoker     Types: Cigarettes     Last attempt to quit: 1991     Years since quittin.5     Smokeless tobacco: Never Used   Substance and Sexual Activity     Alcohol use: Yes     Alcohol/week:  "0.0 oz     Comment: socially     Drug use: No     Sexual activity: Yes     Partners: Male   Other Topics Concern      Service No     Blood Transfusions No     Caffeine Concern No     Occupational Exposure Yes     Comment: work in biomedical     Hobby Hazards No     Sleep Concern No     Stress Concern No     Weight Concern Yes     Comment: getting liposuction     Special Diet No     Back Care Not Asked     Exercise No     Bike Helmet No     Seat Belt Yes     Self-Exams Yes     Comment: sometimes   Social History Narrative     None       ROS:   Constitutional: No fever, chills, or sweats. No weight gain/loss   ENT: No visual disturbance, ear ache, epistaxis, sore throat  Allergies/Immunologic: Negative.   Respiratory: No cough, hemoptysia  Cardiovascular: As per HPI  GI: No nausea, vomiting, hematemesis, melena, or hematochezia  : No urinary frequency, dysuria, or hematuria  Integument: Negative  Psychiatric: Negative  Neuro: Negative  Endocrinology: Negative   Musculoskeletal: Negative    EXAM:  /84   Pulse 76   Ht 1.638 m (5' 4.5\")   Wt 84.9 kg (187 lb 3.2 oz)   LMP 04/25/2011   BMI 31.64 kg/m     In general, the patient is a pleasant female in no apparent distress.    HEENT: NC/AT.  PERRLA.  EOMI.  Sclerae white, not injected.  Nares clear.  Pharynx without erythema or exudate.  Dentition intact.    Neck:  Carotids +4/4 bilaterally without bruits.  No jugular venous distension.   Heart: RRR. Normal S1, S2 splits physiologically.     Lungs: CTA.  No ronchi, wheezes, rales.   Abdomen: Soft, nontender, nondistended.   Extremities: No clubbing, cyanosis, or edema.   Neurologic: Alert and oriented to person/place/time, normal speech, gait and affect  Skin: No petechiae, purpura or rash.    Labs:  LIPID RESULTS:  Lab Results   Component Value Date    CHOL 239 (H) 01/10/2019    HDL 38 (L) 01/10/2019     (H) 01/10/2019    TRIG 306 (H) 01/10/2019    CHOLHDLRATIO 4.4 03/11/2011    NHDL 201 (H) " 01/10/2019       LIVER ENZYME RESULTS:  Lab Results   Component Value Date    AST 17 01/10/2019    ALT 31 01/10/2019       CBC RESULTS:  Lab Results   Component Value Date    WBC 9.3 01/10/2019    RBC 4.84 01/10/2019    HGB 14.7 01/10/2019    HCT 42.3 01/10/2019    MCV 87 01/10/2019    MCH 30.4 01/10/2019    MCHC 34.8 01/10/2019    RDW 11.8 01/10/2019     01/10/2019       BMP RESULTS:  Lab Results   Component Value Date     01/10/2019    POTASSIUM 4.3 01/10/2019    CHLORIDE 105 01/10/2019    CO2 26 01/10/2019    ANIONGAP 9 01/10/2019    GLC 92 01/10/2019    BUN 7 01/10/2019    CR 0.61 01/10/2019    GFRESTIMATED >90 01/10/2019    GFRESTBLACK >90 01/10/2019    JOSEPHINE 8.8 01/10/2019        A1C RESULTS:  No results found for: A1C    INR RESULTS:  Lab Results   Component Value Date    INR 1.04 04/15/2010       ASIA Wilson MD     CC  Patient Care Team:  Nilay  MD Magno as PCP - General  Aryan Mon PA-C as PCP - Assigned PCP  ARYAN MON    Service Date: 01/17/2019      HISTORY OF PRESENT ILLNESS:  Ms. 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  This was apparently discovered in 1996 when she was pregnant with her first child.  They heard a murmur, and she said that she had had a murmur her whole life, but the OB/GYN rightfully wanted to know the etiology and sent her for an echo, and that led to ultimately the diagnosis.  At the time she was pregnant.  She actually continued with that pregnancy without any issues and underwent surgery 06/1996 three months after delivery with Dr. Norris at [facility].  At this point, we do not have that operative report.  She reports that she has always had regional wall motion abnormalities since the diagnosis was made and it has not really changed.  She reports that she had a LIMA to the LAD but does not know any other details.   She does have a family history of stroke in her mom at age 79.  She also has history of congenital heart disease.  Her older brother apparently had what sounds like an ASD device in his adulthood.  Her sister has a pacemaker.  She has 2 children, ages 23 and 20.  She smoked 2 packs a day for 6 years and quit at age 22.  She is .  She works as a  in a [employer] division for the past 30 years.      She was last seen in 2010 by Dr. Haskins.  At that time she had recently had a thyroid storm and that was quite symptomatic.  She at this point, obviously, has that treated.  She did undergo cholesterol today.  Her LDL was 140 with an HDL of 38.  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  She did gym, track, dance throughout her childhood and felt she could keep up with her peers without issues.  At times she would notice some left chest and side pain when she was a kid, but he was always told it was gas.  She is otherwise healthy.  She has a little bit of shortness of breath going up 3 flights but otherwise no chest pain.  She has an atypical chest pain that she kind of describes only when she lies down and it is kind of a sparkly sensation that lasts a couple of seconds.  She has also had some blood vessels in her chest, some spider veins and was hoping that they were not related to her underlying heart issues.  She is currently not on any cardiac medications.      IMPRESSION, REPORT, PLAN:   1.  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  Subsequent surgery 06/1996 by Dr. Norris apparently at [facility].  We do not have this operative report.   2.  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   3.  Hyperlipidemia.   4.  History of thyrotoxicosis.   5.  Atypical chest pain.      DISCUSSION:  It was a  pleasure to see Ms. Fofana in Cardiology Clinic.  Today I discussed her history and reviewed her testing and listened to her symptoms in detail.  We discussed that her chest pain is a bit atypical, but I do think it is worth getting a more further evaluation and will plan to do a coronary CTA.  This is to see where her coronary anatomy lies at this point, as there is nothing in the system.  We will also work on getting her operative report from Dr. Norris to see exactly what he did and will also do an exercise nuclear stress test.  I chose nuclear, obviously, given the fact that she has some baseline regional wall motion abnormalities, and I then will plan to see her back.  She did agree to start a small dose of a cholesterol medicine, just Lipitor 20, as she is at higher risk given the family history and nicotine dependence, which she agrees to.  We will plan to check her cholesterol when she comes back as well.  It was a pleasure to see her today.  Please do not hesitate to contact me with any questions or concerns.         SHIKHA WILSON MD             D: 2019   T: 2019   MT: CARL      Name:     MEDINA FOFANA   MRN:      [id number]        Account:      [id number]   :      1965           Service Date: 2019      Document: [id number]       Thank you for allowing me to participate in the care of your patient.      Sincerely,     Shikha Wilson MD     [facility]    cc:   Aryan Mon PA-C  [address] [place] [address] [place] [address]

## 2019-01-17 NOTE — PROGRESS NOTES
HPI:     Please see dictated note    PAST MEDICAL HISTORY:  Past Medical History:   Diagnosis Date     Grave's disease 2010     Other specified congenital anomaly of heart(476.89)     abnl left coronary artery defect       CURRENT MEDICATIONS:  Current Outpatient Medications   Medication Sig Dispense Refill     clobetasol (TEMOVATE) 0.05 % ointment Apply sparingly to affected area twice daily as needed.  Do not apply to face. 45 g 1       PAST SURGICAL HISTORY:  Past Surgical History:   Procedure Laterality Date     C NONSPECIFIC PROCEDURE      left coronary artery revision       ALLERGIES     Allergies   Allergen Reactions     Aspirin Hives       FAMILY HISTORY:  Family History   Problem Relation Age of Onset     Heart Disease Maternal Grandfather         MI     Cancer - colorectal Paternal Grandfather      Diabetes Maternal Uncle      Thyroid Disease Sister         hypothyroidism     Neurologic Disorder Sister         MS diagnosed at 40y.o.       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   Tobacco Use     Smoking status: Former Smoker     Types: Cigarettes     Last attempt to quit: 1991     Years since quittin.5     Smokeless tobacco: Never Used   Substance and Sexual Activity     Alcohol use: Yes     Alcohol/week: 0.0 oz     Comment: socially     Drug use: No     Sexual activity: Yes     Partners: Male   Other Topics Concern      Service No     Blood Transfusions No     Caffeine Concern No      "Occupational Exposure Yes     Comment: work in biomedical     Hobby Hazards No     Sleep Concern No     Stress Concern No     Weight Concern Yes     Comment: getting liposuction     Special Diet No     Back Care Not Asked     Exercise No     Bike Helmet No     Seat Belt Yes     Self-Exams Yes     Comment: sometimes   Social History Narrative     None       ROS:   Constitutional: No fever, chills, or sweats. No weight gain/loss   ENT: No visual disturbance, ear ache, epistaxis, sore throat  Allergies/Immunologic: Negative.   Respiratory: No cough, hemoptysia  Cardiovascular: As per HPI  GI: No nausea, vomiting, hematemesis, melena, or hematochezia  : No urinary frequency, dysuria, or hematuria  Integument: Negative  Psychiatric: Negative  Neuro: Negative  Endocrinology: Negative   Musculoskeletal: Negative    EXAM:  /84   Pulse 76   Ht 1.638 m (5' 4.5\")   Wt 84.9 kg (187 lb 3.2 oz)   LMP 04/25/2011   BMI 31.64 kg/m    In general, the patient is a pleasant female in no apparent distress.    HEENT: NC/AT.  PERRLA.  EOMI.  Sclerae white, not injected.  Nares clear.  Pharynx without erythema or exudate.  Dentition intact.    Neck:  Carotids +4/4 bilaterally without bruits.  No jugular venous distension.   Heart: RRR. Normal S1, S2 splits physiologically.     Lungs: CTA.  No ronchi, wheezes, rales.   Abdomen: Soft, nontender, nondistended.   Extremities: No clubbing, cyanosis, or edema.   Neurologic: Alert and oriented to person/place/time, normal speech, gait and affect  Skin: No petechiae, purpura or rash.    Labs:  LIPID RESULTS:  Lab Results   Component Value Date    CHOL 239 (H) 01/10/2019    HDL 38 (L) 01/10/2019     (H) 01/10/2019    TRIG 306 (H) 01/10/2019    CHOLHDLRATIO 4.4 03/11/2011    NHDL 201 (H) 01/10/2019       LIVER ENZYME RESULTS:  Lab Results   Component Value Date    AST 17 01/10/2019    ALT 31 01/10/2019       CBC RESULTS:  Lab Results   Component Value Date    WBC 9.3 01/10/2019 "    RBC 4.84 01/10/2019    HGB 14.7 01/10/2019    HCT 42.3 01/10/2019    MCV 87 01/10/2019    MCH 30.4 01/10/2019    MCHC 34.8 01/10/2019    RDW 11.8 01/10/2019     01/10/2019       BMP RESULTS:  Lab Results   Component Value Date     01/10/2019    POTASSIUM 4.3 01/10/2019    CHLORIDE 105 01/10/2019    CO2 26 01/10/2019    ANIONGAP 9 01/10/2019    GLC 92 01/10/2019    BUN 7 01/10/2019    CR 0.61 01/10/2019    GFRESTIMATED >90 01/10/2019    GFRESTBLACK >90 01/10/2019    JOSEPHINE 8.8 01/10/2019        A1C RESULTS:  No results found for: A1C    INR RESULTS:  Lab Results   Component Value Date    INR 1.04 04/15/2010       ASIA Wilson MD     CC  Patient Care Team:  Sharon Pemberton MD as PCP - General  Aryan Khanna PA-C as PCP - Assigned PCP  ARYAN KHANNA

## 2019-01-23 ENCOUNTER — HOSPITAL ENCOUNTER (OUTPATIENT)
Dept: CARDIOLOGY | Facility: CLINIC | Age: 54
Discharge: HOME | End: 2019-01-23
Attending: INTERNAL MEDICINE | Admitting: INTERNAL MEDICINE
Payer: COMMERCIAL

## 2019-01-23 VITALS — DIASTOLIC BLOOD PRESSURE: 77 MMHG | HEART RATE: 60 BPM | SYSTOLIC BLOOD PRESSURE: 112 MMHG

## 2019-01-23 DIAGNOSIS — Q24.5 ALCAPA (ANOMALOUS LEFT CORONARY ARTERY FROM THE PULMONARY ARTERY): ICD-10-CM

## 2019-01-23 DIAGNOSIS — E78.5 HYPERLIPIDEMIA LDL GOAL <100: ICD-10-CM

## 2019-01-23 PROCEDURE — 25000132 ZZH RX MED GY IP 250 OP 250 PS 637: Performed by: INTERNAL MEDICINE

## 2019-01-23 PROCEDURE — 25000125 ZZHC RX 250: Performed by: INTERNAL MEDICINE

## 2019-01-23 PROCEDURE — 75574 CT ANGIO HRT W/3D IMAGE: CPT | Mod: 26 | Performed by: INTERNAL MEDICINE

## 2019-01-23 PROCEDURE — 25000128 H RX IP 250 OP 636: Performed by: INTERNAL MEDICINE

## 2019-01-23 PROCEDURE — 75574 CT ANGIO HRT W/3D IMAGE: CPT

## 2019-01-23 RX ORDER — METHYLPREDNISOLONE SODIUM SUCCINATE 125 MG/2ML
125 INJECTION, POWDER, LYOPHILIZED, FOR SOLUTION INTRAMUSCULAR; INTRAVENOUS
Status: DISCONTINUED | OUTPATIENT
Start: 2019-01-23 | End: 2019-01-24 | Stop reason: HOSPADM

## 2019-01-23 RX ORDER — IOPAMIDOL 755 MG/ML
150 INJECTION, SOLUTION INTRAVASCULAR ONCE
Status: COMPLETED | OUTPATIENT
Start: 2019-01-23 | End: 2019-01-23

## 2019-01-23 RX ORDER — ACYCLOVIR 200 MG/1
0-1 CAPSULE ORAL
Status: DISCONTINUED | OUTPATIENT
Start: 2019-01-23 | End: 2019-01-24 | Stop reason: HOSPADM

## 2019-01-23 RX ORDER — ONDANSETRON 2 MG/ML
4 INJECTION INTRAMUSCULAR; INTRAVENOUS
Status: DISCONTINUED | OUTPATIENT
Start: 2019-01-23 | End: 2019-01-24 | Stop reason: HOSPADM

## 2019-01-23 RX ORDER — DIPHENHYDRAMINE HCL 25 MG
25 CAPSULE ORAL
Status: DISCONTINUED | OUTPATIENT
Start: 2019-01-23 | End: 2019-01-24 | Stop reason: HOSPADM

## 2019-01-23 RX ORDER — NITROGLYCERIN 0.4 MG/1
0.4 TABLET SUBLINGUAL
Status: DISCONTINUED | OUTPATIENT
Start: 2019-01-23 | End: 2019-01-24 | Stop reason: HOSPADM

## 2019-01-23 RX ORDER — IOPAMIDOL 755 MG/ML
150 INJECTION, SOLUTION INTRAVASCULAR ONCE
Status: DISCONTINUED | OUTPATIENT
Start: 2019-01-23 | End: 2019-01-24 | Stop reason: HOSPADM

## 2019-01-23 RX ORDER — METOPROLOL TARTRATE 1 MG/ML
5-15 INJECTION, SOLUTION INTRAVENOUS
Status: DISCONTINUED | OUTPATIENT
Start: 2019-01-23 | End: 2019-01-24 | Stop reason: HOSPADM

## 2019-01-23 RX ORDER — DIPHENHYDRAMINE HYDROCHLORIDE 50 MG/ML
25-50 INJECTION INTRAMUSCULAR; INTRAVENOUS
Status: DISCONTINUED | OUTPATIENT
Start: 2019-01-23 | End: 2019-01-24 | Stop reason: HOSPADM

## 2019-01-23 RX ORDER — METOPROLOL TARTRATE 50 MG
50-100 TABLET ORAL
Status: COMPLETED | OUTPATIENT
Start: 2019-01-23 | End: 2019-01-23

## 2019-01-23 RX ADMIN — METOPROLOL TARTRATE 100 MG: 50 TABLET ORAL at 09:27

## 2019-01-23 RX ADMIN — NITROGLYCERIN 0.4 MG: 0.4 TABLET SUBLINGUAL at 10:37

## 2019-01-23 RX ADMIN — METOPROLOL TARTRATE 5 MG: 5 INJECTION, SOLUTION INTRAVENOUS at 10:33

## 2019-01-23 RX ADMIN — METOPROLOL TARTRATE 5 MG: 5 INJECTION, SOLUTION INTRAVENOUS at 10:42

## 2019-01-23 RX ADMIN — IOPAMIDOL 110 ML: 755 INJECTION, SOLUTION INTRAVENOUS at 11:11

## 2019-01-25 ENCOUNTER — HOSPITAL ENCOUNTER (OUTPATIENT)
Dept: CARDIOLOGY | Facility: CLINIC | Age: 54
Discharge: HOME | End: 2019-01-25
Attending: INTERNAL MEDICINE | Admitting: INTERNAL MEDICINE
Payer: COMMERCIAL

## 2019-01-25 DIAGNOSIS — E78.5 HYPERLIPIDEMIA LDL GOAL <100: ICD-10-CM

## 2019-01-25 DIAGNOSIS — Q24.5 ALCAPA (ANOMALOUS LEFT CORONARY ARTERY FROM THE PULMONARY ARTERY): ICD-10-CM

## 2019-01-25 PROCEDURE — 93018 CV STRESS TEST I&R ONLY: CPT | Performed by: INTERNAL MEDICINE

## 2019-01-25 PROCEDURE — 78452 HT MUSCLE IMAGE SPECT MULT: CPT | Mod: 26 | Performed by: INTERNAL MEDICINE

## 2019-01-25 PROCEDURE — 93016 CV STRESS TEST SUPVJ ONLY: CPT | Performed by: INTERNAL MEDICINE

## 2019-01-25 PROCEDURE — 34300033 ZZH RX 343: Performed by: INTERNAL MEDICINE

## 2019-01-25 PROCEDURE — 78452 HT MUSCLE IMAGE SPECT MULT: CPT

## 2019-01-25 PROCEDURE — A9502 TC99M TETROFOSMIN: HCPCS | Performed by: INTERNAL MEDICINE

## 2019-01-25 RX ADMIN — TETROFOSMIN 9.5 MCI.: 1.38 INJECTION, POWDER, LYOPHILIZED, FOR SOLUTION INTRAVENOUS at 10:46

## 2019-01-25 RX ADMIN — TETROFOSMIN 3.54 MCI.: 1.38 INJECTION, POWDER, LYOPHILIZED, FOR SOLUTION INTRAVENOUS at 09:01

## 2019-01-29 PROBLEM — Q24.9 CONGENITAL HEART ANOMALY: Status: ACTIVE | Noted: 2019-01-29

## 2019-01-29 PROBLEM — Q24.5: Status: ACTIVE | Noted: 2019-01-29

## 2019-02-14 ENCOUNTER — OFFICE VISIT (OUTPATIENT)
Dept: CARDIOLOGY | Facility: CLINIC | Age: 54
End: 2019-02-14
Payer: COMMERCIAL

## 2019-02-14 VITALS
BODY MASS INDEX: 31.27 KG/M2 | HEIGHT: 65 IN | HEART RATE: 72 BPM | SYSTOLIC BLOOD PRESSURE: 116 MMHG | WEIGHT: 187.7 LBS | DIASTOLIC BLOOD PRESSURE: 82 MMHG

## 2019-02-14 DIAGNOSIS — E78.5 HYPERLIPIDEMIA LDL GOAL <100: ICD-10-CM

## 2019-02-14 DIAGNOSIS — Q24.5 ALCAPA (ANOMALOUS LEFT CORONARY ARTERY FROM THE PULMONARY ARTERY): ICD-10-CM

## 2019-02-14 DIAGNOSIS — R07.89 ATYPICAL CHEST PAIN: ICD-10-CM

## 2019-02-14 DIAGNOSIS — R06.02 SOB (SHORTNESS OF BREATH): Primary | ICD-10-CM

## 2019-02-14 DIAGNOSIS — R94.39 ABNORMAL CARDIOVASCULAR STRESS TEST: ICD-10-CM

## 2019-02-14 LAB
CHOLEST SERPL-MCNC: 153 MG/DL
HDLC SERPL-MCNC: 48 MG/DL
LDLC SERPL CALC-MCNC: 74 MG/DL
NONHDLC SERPL-MCNC: 105 MG/DL
TRIGL SERPL-MCNC: 155 MG/DL

## 2019-02-14 PROCEDURE — 36415 COLL VENOUS BLD VENIPUNCTURE: CPT | Performed by: INTERNAL MEDICINE

## 2019-02-14 PROCEDURE — 99213 OFFICE O/P EST LOW 20 MIN: CPT | Performed by: INTERNAL MEDICINE

## 2019-02-14 PROCEDURE — 80061 LIPID PANEL: CPT | Performed by: INTERNAL MEDICINE

## 2019-02-14 RX ORDER — LORAZEPAM 0.5 MG/1
0.5 TABLET ORAL
Status: CANCELLED | OUTPATIENT
Start: 2019-02-14

## 2019-02-14 RX ORDER — COVID-19 ANTIGEN TEST
220 KIT MISCELLANEOUS 2 TIMES DAILY WITH MEALS
Status: ON HOLD | COMMUNITY
End: 2021-05-25

## 2019-02-14 RX ORDER — SODIUM CHLORIDE 9 MG/ML
INJECTION, SOLUTION INTRAVENOUS CONTINUOUS
Status: CANCELLED | OUTPATIENT
Start: 2019-02-14

## 2019-02-14 RX ORDER — LIDOCAINE 40 MG/G
CREAM TOPICAL
Status: CANCELLED | OUTPATIENT
Start: 2019-02-14

## 2019-02-14 RX ORDER — POTASSIUM CHLORIDE 1500 MG/1
20 TABLET, EXTENDED RELEASE ORAL
Status: CANCELLED | OUTPATIENT
Start: 2019-02-14

## 2019-02-14 RX ORDER — LORAZEPAM 2 MG/ML
0.5 INJECTION INTRAMUSCULAR
Status: CANCELLED | OUTPATIENT
Start: 2019-02-14

## 2019-02-14 ASSESSMENT — MIFFLIN-ST. JEOR: SCORE: 1439.34

## 2019-02-14 NOTE — PROGRESS NOTES
Service Date: 02/14/2019      HISTORY OF PRESENT ILLNESS:  Ms. 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  She then underwent surgery with Dr. Norris.  At the time I saw her, I did not have her operative report.  I do have it now.  He describes closure of the proximal left coronary artery inside the pulmonary artery and then takedown of a LIMA and implantation to the LAD.  In that OP report they also describe fistulous connection close between the RCA and the LAD.  This was done by Dr. Norris on 06/25/1996.  There is a family history of congenital heart disease as outlined in my note.      She was complaining of some left-sided chest pain and shortness of breath.  The chest pain was a bit atypical.  She did undergo an echocardiogram before I saw her on 01/10/2019.  Her ejection fraction was 35%-40% with apical hypokinesis.  I sent her on for a CTA.  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  They described the LAD still connected to the pulmonary artery.  The calcium score was 0.  She also had a nuclear stress test with exercise.  She went 8 minutes 30 seconds.  Blood pressure increased to 154/80 from 128/80 with a double product of 21,000.  She did not describe any symptoms during the study, and with this they demonstrated moderate to large, partially reversible apical, mid, distal anterior lateral defect consistent with mild infarction with moderate dylon-infarct ischemia.      IMPRESSION, REPORT, PLAN:   1.  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 Norris.  I do have his operative report, and he describes closure of the proximal LAD inside  the pulmonary artery and a LIMA placed to the LAD, now with atypical chest pain and a stress test showing moderate dylon-infarct ischemia in the LAD territory with a CT showing the LAD still connected to the pulmonary artery.   2.  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   3.  Hyperlipidemia.   4.  History of thyrotoxicosis.   5.  Atypical chest pain.      DISCUSSION:  It was a pleasure to see Ms. Pizarro in followup.  We discussed the results of her testing, which according to the CT suggests that she still has a connection of the LAD to the pulmonary artery in addition to a LIMA, which would explain with the LIMA being patent why she would have moderate dylon-infarct ischemia -- as there is competitive flow with the oxygenating and deoxygenating it needs blood.  I recommended that we do a coronary angiogram and right heart cath and the reason for doing it.  She understands and is in agreement with the plan.  It was a pleasure to see her.  Please do not hesitate to contact me with any questions or concerns.         MARCIAL CRAIG MD             D: 2019   T: 2019   MT: CARL      Name:     MEDINA PIZARRO   MRN:      [id number]        Account:      [id number]   :      1965           Service Date: 2019      Document: [id number]

## 2019-02-14 NOTE — PROGRESS NOTES
HPI:     Please see dictated note    PAST MEDICAL HISTORY:  Past Medical History:   Diagnosis Date     Grave's disease 2010     Other specified congenital anomaly of heart(646.)     abnl left coronary artery defect       CURRENT MEDICATIONS:  Current Outpatient Medications   Medication Sig Dispense Refill     atorvastatin (LIPITOR) 20 MG tablet Take 1 tablet (20 mg) by mouth daily 90 tablet 3     clobetasol (TEMOVATE) 0.05 % ointment Apply sparingly to affected area twice daily as needed.  Do not apply to face. 45 g 1     naproxen sodium (ALEVE) 220 MG capsule Take 220 mg by mouth 2 times daily (with meals)         PAST SURGICAL HISTORY:  Past Surgical History:   Procedure Laterality Date     C NONSPECIFIC PROCEDURE      left coronary artery revision       ALLERGIES     Allergies   Allergen Reactions     Aspirin Hives       FAMILY HISTORY:  Family History   Problem Relation Age of Onset     Heart Disease Maternal Grandfather         MI     Cancer - colorectal Paternal Grandfather      Diabetes Maternal Uncle      Thyroid Disease Sister         hypothyroidism     Neurologic Disorder Sister         MS diagnosed at 40y.o.       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   Tobacco Use     Smoking status: Former Smoker     Packs/day: 1.50     Years: 10.00     Pack years: 15.00     Types: Cigarettes     Last attempt to quit: 1991     Years since quittin.6     Smokeless tobacco: Never Used   Substance and Sexual Activity  "    Alcohol use: Yes     Alcohol/week: 0.0 oz     Comment: socially     Drug use: No     Sexual activity: Yes     Partners: Male   Other Topics Concern      Service No     Blood Transfusions No     Caffeine Concern No     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 Not Asked   Social History Narrative     None       ROS:   Constitutional: No fever, chills, or sweats. No weight gain/loss   ENT: No visual disturbance, ear ache, epistaxis, sore throat  Allergies/Immunologic: Negative.   Respiratory: No cough, hemoptysia  Cardiovascular: As per HPI  GI: No nausea, vomiting, hematemesis, melena, or hematochezia  : No urinary frequency, dysuria, or hematuria  Integument: Negative  Psychiatric: Negative  Neuro: Negative  Endocrinology: Negative   Musculoskeletal: Negative    EXAM:  /82   Pulse 72   Ht 1.638 m (5' 4.5\")   Wt 85.1 kg (187 lb 11.2 oz)   LMP 04/25/2011   BMI 31.72 kg/m    In general, the patient is a pleasant female in no apparent distress.        Labs:  LIPID RESULTS:  Lab Results   Component Value Date    CHOL 153 02/14/2019    HDL 48 (L) 02/14/2019    LDL 74 02/14/2019    TRIG 155 (H) 02/14/2019    CHOLHDLRATIO 4.4 03/11/2011    NHDL 105 02/14/2019       LIVER ENZYME RESULTS:  Lab Results   Component Value Date    AST 17 01/10/2019    ALT 31 01/10/2019       CBC RESULTS:  Lab Results   Component Value Date    WBC 9.3 01/10/2019    RBC 4.84 01/10/2019    HGB 14.7 01/10/2019    HCT 42.3 01/10/2019    MCV 87 01/10/2019    MCH 30.4 01/10/2019    MCHC 34.8 01/10/2019    RDW 11.8 01/10/2019     01/10/2019       BMP RESULTS:  Lab Results   Component Value " Date     01/10/2019    POTASSIUM 4.3 01/10/2019    CHLORIDE 105 01/10/2019    CO2 26 01/10/2019    ANIONGAP 9 01/10/2019    GLC 92 01/10/2019    BUN 7 01/10/2019    CR 0.61 01/10/2019    GFRESTIMATED >90 01/10/2019    GFRESTBLACK >90 01/10/2019    JOSEPHINE 8.8 01/10/2019        A1C RESULTS:  No results found for: A1C    INR RESULTS:  Lab Results   Component Value Date    INR 1.04 04/15/2010     ASIA Wilson MD       CC  Patient Care Team:  Sharon Pemberton MD as PCP - General  [facility], rAyan Beltran PA-C as PCP - Assigned PCP  Aubrie Garcia, RN as Nurse Coordinator (Cardiology)  Shikha Wilson MD as MD (Cardiology)  SHIKHA WILSON

## 2019-02-14 NOTE — NURSING NOTE
Chief Complaint   Patient presents with     Heart Problem        Left Heart Catheterization: Patient given Coronary Angiogram and Angioplasty: A Patient's Guide booklet. Patient was instructed regarding left heart catheterization, including discussion of the indication, procedure, preparation, intra-procedural steps, and recovery at home. Patient demonstrated understanding of this information and agreed to call with further questions or concerns.  Med Reconcile: Reviewed and verified all current medications with the patient. The updated medication list was printed and given to the patient.  Return Appointment: Patient given instructions regarding scheduling next clinic visit. Patient demonstrated understanding of this information and agreed to call with further questions or concerns.  Right Heart Catheterization: Patient was instructed regarding right heart catheterization, including discussion of the procedure, preparation, intra-procedural steps, and recovery at home. Patient demonstrated understanding of this information and agreed to call with further questions or concerns.  Patient stated she understood all health information given and agreed to call with further questions or concerns.     Aubrie Garcia RN, BSN  Cardiology Care Coordinator  [facility]  [email or web address]  [phone]

## 2019-02-14 NOTE — LETTER
2/14/2019      [facility] MD [place]      RE: Jesenia Murilloon       Dear Colleague,    I had the pleasure of seeing Jesenia Fofana in the [facility].    Service Date: 02/14/2019      HISTORY OF PRESENT ILLNESS:  Ms. 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  She then underwent surgery with Dr. Norris.  At the time I saw her, I did not have her operative report.  I do have it now.  He describes closure of the proximal left coronary artery inside the pulmonary artery and then takedown of a LIMA and implantation to the LAD.  In that OP report they also describe fistulous connection close between the RCA and the LAD.  This was done by Dr. Norris on 06/25/1996.  There is a family history of congenital heart disease as outlined in my note.      She was complaining of some left-sided chest pain and shortness of breath.  The chest pain was a bit atypical.  She did undergo an echocardiogram before I saw her on 01/10/2019.  Her ejection fraction was 35%-40% with apical hypokinesis.  I sent her on for a CTA.  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  They described the LAD still connected to the pulmonary artery.  The calcium score was 0.  She also had a nuclear stress test with exercise.  She went 8 minutes 30 seconds.  Blood pressure increased to 154/80 from 128/80 with a double product of 21,000.  She did not describe any symptoms during the study, and with this they demonstrated moderate to large, partially reversible apical, mid, distal anterior lateral defect consistent with mild infarction with moderate dylon-infarct ischemia.      IMPRESSION, REPORT, PLAN:   1.  History  of ALCAPA diagnosed in  and present with her first child, with surgery 1996 by Dr. Norris.  I do have his operative report, and he describes closure of the proximal LAD inside the pulmonary artery and a LIMA placed to the LAD, now with atypical chest pain and a stress test showing moderate dylon-infarct ischemia in the LAD territory with a CT showing the LAD still connected to the pulmonary artery.   2.  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   3.  Hyperlipidemia.   4.  History of thyrotoxicosis.   5.  Atypical chest pain.      DISCUSSION:  It was a pleasure to see Ms. Pizarro in followup.  We discussed the results of her testing, which according to the CT suggests that she still has a connection of the LAD to the pulmonary artery in addition to a LIMA, which would explain with the LIMA being patent why she would have moderate dylon-infarct ischemia -- as there is competitive flow with the oxygenating and deoxygenating it needs blood.  I recommended that we do a coronary angiogram and right heart cath and the reason for doing it.  She understands and is in agreement with the plan.  It was a pleasure to see her.  Please do not hesitate to contact me with any questions or concerns.         MARCIAL CRAIG MD         D: 2019   T: 2019   MT: CARL      Name:     MEDINA PIZARRO   MRN:      [id number]        Account:      [id number]   :      1965           Service Date: 2019      Document: [id number]      Outpatient Encounter Medications as of 2019   Medication Sig Dispense Refill     atorvastatin (LIPITOR) 20 MG tablet Take 1 tablet (20 mg) by mouth daily 90 tablet 3     naproxen sodium (ALEVE) 220 MG capsule Take 220 mg by mouth 2 times daily (with meals)       [DISCONTINUED] clobetasol (TEMOVATE) 0.05 % ointment Apply sparingly to affected area twice daily as needed.  Do not apply to face. (Patient not taking: Reported on 2019) 45 g  1     No facility-administered encounter medications on file as of 2/14/2019.      Again, thank you for allowing me to participate in the care of your patient.      Sincerely,    Shikha Wilson MD     [facility]

## 2019-02-14 NOTE — LETTER
2/14/2019     Clinic MD [place]  No address on file    RE: Jesenia Fofana       Dear Colleague,    I had the pleasure of seeing Jesenia Fofana in the [facility].    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  Do not apply to face. 45 g 1     naproxen sodium (ALEVE) 220 MG capsule Take 220 mg by mouth 2 times daily (with meals)         PAST SURGICAL HISTORY:  Past Surgical History:   Procedure Laterality Date     C NONSPECIFIC PROCEDURE  1997    left coronary artery revision       ALLERGIES     Allergies   Allergen Reactions     Aspirin Hives       FAMILY HISTORY:  Family History   Problem Relation Age of Onset     Heart Disease Maternal Grandfather         MI     Cancer - colorectal Paternal Grandfather      Diabetes Maternal Uncle      Thyroid Disease Sister         hypothyroidism     Neurologic Disorder Sister         MS diagnosed at 40y.o.       SOCIAL HISTORY:  Social History     Socioeconomic History     Marital status:      Spouse name: Adolfo     Number of children: 2     Years of education: 14 years     Highest education level: None   Social Needs     Financial resource strain: None     Food insecurity - worry: None     Food insecurity - inability: None     Transportation needs - medical: None     Transportation needs - non-medical: None   Occupational History     Occupation: management     Employer: [employer]     Comment: [employer]   Tobacco Use     Smoking status: Former Smoker      "Packs/day: 1.50     Years: 10.00     Pack years: 15.00     Types: Cigarettes     Last attempt to quit: 1991     Years since quittin.6     Smokeless tobacco: Never Used   Substance and Sexual Activity     Alcohol use: Yes     Alcohol/week: 0.0 oz     Comment: socially     Drug use: No     Sexual activity: Yes     Partners: Male   Other Topics Concern      Service No     Blood Transfusions No     Caffeine Concern No     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 Not Asked   Social History Narrative     None       ROS:   Constitutional: No fever, chills, or sweats. No weight gain/loss   ENT: No visual disturbance, ear ache, epistaxis, sore throat  Allergies/Immunologic: Negative.   Respiratory: No cough, hemoptysia  Cardiovascular: As per HPI  GI: No nausea, vomiting, hematemesis, melena, or hematochezia  : No urinary frequency, dysuria, or hematuria  Integument: Negative  Psychiatric: Negative  Neuro: Negative  Endocrinology: Negative   Musculoskeletal: Negative    EXAM:  /82   Pulse 72   Ht 1.638 m (5' 4.5\")   Wt 85.1 kg (187 lb 11.2 oz)   LMP 2011   BMI 31.72 kg/m     In general, the patient is a pleasant female in no apparent distress.        Labs:  LIPID RESULTS:  Lab Results   Component Value Date    CHOL 153 2019    HDL 48 (L) 2019    LDL 74 2019    TRIG 155 (H) 2019    CHOLHDLRATIO 4.4 2011    NHDL 105 2019       LIVER ENZYME RESULTS:  Lab Results   Component Value Date    AST 17 01/10/2019    ALT 31 01/10/2019       CBC RESULTS:  Lab Results   Component Value Date    WBC 9.3 01/10/2019    RBC 4.84 01/10/2019 "    HGB 14.7 01/10/2019    HCT 42.3 01/10/2019    MCV 87 01/10/2019    MCH 30.4 01/10/2019    MCHC 34.8 01/10/2019    RDW 11.8 01/10/2019     01/10/2019       BMP RESULTS:  Lab Results   Component Value Date     01/10/2019    POTASSIUM 4.3 01/10/2019    CHLORIDE 105 01/10/2019    CO2 26 01/10/2019    ANIONGAP 9 01/10/2019    GLC 92 01/10/2019    BUN 7 01/10/2019    CR 0.61 01/10/2019    GFRESTIMATED >90 01/10/2019    GFRESTBLACK >90 01/10/2019    JOSEPHINE 8.8 01/10/2019        A1C RESULTS:  No results found for: A1C    INR RESULTS:  Lab Results   Component Value Date    INR 1.04 04/15/2010     ASIA Wilson MD       CC  Patient Care Team:  Sharon Pembreton MD as PCP - General  Aryan Mon PA-C as PCP - Assigned PCP  Aubrie Garcia RN as Nurse Coordinator (Cardiology)  Shikha Wilson MD as MD (Cardiology)  SHIKHA WILSON    Service Date: 02/14/2019      HISTORY OF PRESENT ILLNESS:  Ms. 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  She then underwent surgery with Dr. Norris.  At the time I saw her, I did not have her operative report.  I do have it now.  He describes closure of the proximal left coronary artery inside the pulmonary artery and then takedown of a LIMA and implantation to the LAD.  In that OP report they also describe fistulous connection close between the RCA and the LAD.  This was done by Dr. Norris on 06/25/1996.  There is a family history of congenital heart disease as outlined in my note.      She was complaining of some left-sided chest pain and shortness of breath.  The chest pain was a bit atypical.  She did undergo an echocardiogram before I saw her on 01/10/2019.  Her ejection fraction was 35%-40% with apical hypokinesis.  I sent her on for a CTA.  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  They described the LAD still connected to the pulmonary artery.  The calcium score was 0.  She also had a nuclear stress test with exercise.  She went 8 minutes 30 seconds.  Blood pressure increased to 154/80 from 128/80 with a double product of 21,000.  She did not describe any symptoms during the study, and with this they demonstrated moderate to large, partially reversible apical, mid, distal anterior lateral defect consistent with mild infarction with moderate dylon-infarct ischemia.      IMPRESSION, REPORT, PLAN:   1.  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 Norris.  I do have his operative report, and he describes closure of the proximal LAD inside the pulmonary artery and a LIMA placed to the LAD, now with atypical chest pain and a stress test showing moderate dylon-infarct ischemia in the LAD territory with a CT showing the LAD still connected to the pulmonary artery.   2.  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   3.  Hyperlipidemia.   4.  History of thyrotoxicosis.   5.  Atypical chest pain.      DISCUSSION:  It was a pleasure to see Ms. Fofana in followup.  We discussed the results of her testing, which according to the CT suggests that she still has a connection of the LAD to the pulmonary artery in addition to a LIMA, which would explain with the LIMA being patent why she would have moderate dylon-infarct ischemia -- as there is competitive flow with the oxygenating and deoxygenating it needs blood.  I recommended that we do a coronary angiogram and right heart cath and the reason for doing it.  She understands and is in agreement with the plan.  It was a pleasure to see her.  Please do not hesitate to contact me with any questions or concerns.          SHIKHA WILSON MD             D: 2019   T: 2019   MT: CARL      Name:     MEDINA PIZARRO   MRN:      [id number]        Account:      [id number]   :      1965           Service Date: 2019      Document: [id number]       Thank you for allowing me to participate in the care of your patient.      Sincerely,     Shikha Wilson MD     [facility]    cc:   Shikha Wilson MD  [address] [facility] [address]  [place] [address]

## 2019-02-14 NOTE — PATIENT INSTRUCTIONS
"You were seen today in the Adult Congenital and Cardiovascular Genetics Clinic at the [facility].    Cardiology Providers you saw during your visit:  Dr. Genaro Wilson     Diagnosis:  ALCAPA s/p repair    Results:  The results of your testing were discussed with you today.    Recommendations:    1.  Continue to eat a heart healthy, low salt diet.  2.  Continue to get 20-30 minutes of aerobic activity, 4-5 days per week.  Examples of aerobic activity include walking, running, swimming, cycling, etc.  3.  Continue to observe good oral hygiene, with regular dental visits.  4.  Please have a heart cath with coronary angiogram.    Vitals:    02/14/19 1037   BP: 116/82   Pulse: 72   Weight: 85.1 kg (187 lb 11.2 oz)   Height: 1.638 m (5' 4.5\")     Exercise restrictions:   Yes__X__  No____         If yes, list restrictions:  Must be allowed to rest if fatigued or SOB      Work restrictions:  Yes____  No__X__         If yes, list restrictions:    FASTING CHOLESTEROL was checked in the last 5 years YES_X__  NO___   Continue to eat a heart healthy, low salt diet.         ____ Fasting lipid panel order today         ____ No changes in medications          ____ I recommend the following changes in your cholesterol medications.:          ____ Please follow up for cholesterol screening at your primary care physician      Follow-up:  Follow up with Dr. Wilson to be determined after heart cath.     If you have questions or concerns please contact us at:    Aubrie Garcia RN, BSN   Kamaljit Atkins (Scheduling)  Nurse Coordinator     Clinic   Adult Congenital and CV Genetics Adult Congenital and CV Genetic  [facility]  (P)[phone]    (P) [phone]  [email or web address] (F)[phone]        For after hours urgent needs, call [phone] and ask to speak to the Adult Congenital Physician on call.  Mention Job Code " 0401.    For emergencies call 911.    [facility]  [facility]  Mail Code [id number]  [address] [facility], [place] [id number]

## 2019-02-21 ENCOUNTER — MYC MEDICAL ADVICE (OUTPATIENT)
Dept: CARDIOLOGY | Facility: CLINIC | Age: 54
End: 2019-02-21

## 2019-02-27 ENCOUNTER — ANCILLARY PROCEDURE (OUTPATIENT)
Dept: GENERAL RADIOLOGY | Facility: CLINIC | Age: 54
End: 2019-02-27
Attending: FAMILY MEDICINE
Payer: COMMERCIAL

## 2019-02-27 ENCOUNTER — OFFICE VISIT (OUTPATIENT)
Dept: FAMILY MEDICINE | Facility: CLINIC | Age: 54
End: 2019-02-27
Payer: COMMERCIAL

## 2019-02-27 VITALS
TEMPERATURE: 98 F | OXYGEN SATURATION: 98 % | HEART RATE: 76 BPM | BODY MASS INDEX: 31.49 KG/M2 | DIASTOLIC BLOOD PRESSURE: 84 MMHG | HEIGHT: 65 IN | SYSTOLIC BLOOD PRESSURE: 114 MMHG | WEIGHT: 189 LBS

## 2019-02-27 DIAGNOSIS — M25.562 CHRONIC PAIN OF LEFT KNEE: ICD-10-CM

## 2019-02-27 DIAGNOSIS — M25.562 CHRONIC PAIN OF LEFT KNEE: Primary | ICD-10-CM

## 2019-02-27 DIAGNOSIS — G89.29 CHRONIC PAIN OF LEFT KNEE: Primary | ICD-10-CM

## 2019-02-27 DIAGNOSIS — G89.29 CHRONIC PAIN OF LEFT KNEE: ICD-10-CM

## 2019-02-27 PROCEDURE — 99214 OFFICE O/P EST MOD 30 MIN: CPT | Performed by: FAMILY MEDICINE

## 2019-02-27 PROCEDURE — 73562 X-RAY EXAM OF KNEE 3: CPT | Mod: LT

## 2019-02-27 ASSESSMENT — MIFFLIN-ST. JEOR: SCORE: 1445.24

## 2019-02-27 NOTE — PROGRESS NOTES
SUBJECTIVE:   Jesenia Fofana is a 53 year old female who presents to clinic today for the following health issues:      Joint Pain    Onset: ongoing pain but worse in the last year    Description:   Location: left knee  Character: Sharp and Dull ache    Intensity: mild    Progression of Symptoms: worse    Accompanying Signs & Symptoms:  Other symptoms: swelling    History:   Previous similar pain: no       Precipitating factors:   Trauma or overuse: YES- injury years ago in her teens    Alleviating factors:  Improved by: nothing    Therapies Tried and outcome: Physical therapy, ice, aleve, tylenol          Problem list and histories reviewed & adjusted, as indicated.  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)     SOB (shortness of breath)     Atypical chest pain     Abnormal cardiovascular stress test     Past Surgical History:   Procedure Laterality Date     C NONSPECIFIC PROCEDURE      left coronary artery revision       Social History     Tobacco Use     Smoking status: Former Smoker     Packs/day: 1.50     Years: 10.00     Pack years: 15.00     Types: Cigarettes     Last attempt to quit: 1991     Years since quittin.6     Smokeless tobacco: Never Used   Substance Use Topics     Alcohol use: Yes     Alcohol/week: 0.0 oz     Comment: socially     Family History   Problem Relation Age of Onset     Heart Disease Maternal Grandfather         MI     Cancer - colorectal Paternal Grandfather      Diabetes Maternal Uncle      Thyroid Disease Sister         hypothyroidism     Neurologic Disorder Sister         MS diagnosed at 40y.o.         Current Outpatient Medications   Medication Sig  "Dispense Refill     atorvastatin (LIPITOR) 20 MG tablet Take 1 tablet (20 mg) by mouth daily 90 tablet 3     naproxen sodium (ALEVE) 220 MG capsule Take 220 mg by mouth 2 times daily (with meals)       Allergies   Allergen Reactions     Aspirin Hives       Reviewed and updated as needed this visit by clinical staff       Reviewed and updated as needed this visit by Provider         ROS:  CONSTITUTIONAL: NEGATIVE for fever, chills, change in weight  ENT/MOUTH: NEGATIVE for ear, mouth and throat problems  RESP: NEGATIVE for significant cough or SOB  CV: NEGATIVE for chest pain, palpitations or peripheral edema    OBJECTIVE:                                                    /84   Pulse 76   Temp 98  F (36.7  C) (Tympanic)   Ht 1.638 m (5' 4.5\")   Wt 85.7 kg (189 lb)   LMP 04/25/2011   SpO2 98%   BMI 31.94 kg/m    Body mass index is 31.94 kg/m .   GENERAL: healthy, alert, well nourished, well hydrated, no distress  HENT: ear canals- normal; TMs- normal; Nose- normal; Mouth- no ulcers, no lesions  NECK: no tenderness, no adenopathy, no asymmetry, no masses, no stiffness; thyroid- normal to palpation  RESP: lungs clear to auscultation - no rales, no rhonchi, no wheezes  CV: regular rates and rhythm, normal S1 S2, no S3 or S4 and no murmur, no click or rub -  ABDOMEN: soft, no tenderness, no  hepatosplenomegaly, no masses, normal bowel sounds  MS: Bilateral knee with crepitation with range of motion.  Mild diffuse tenderness noted over the left knee.  Minimal swelling.  No erythema or ecchymosis.         ASSESSMENT/PLAN:                                                        ICD-10-CM    1. Chronic pain of left knee M25.562 XR Knee Left 3 Views    G89.29 ORTHOPEDICS ADULT REFERRAL     X-ray of the left knee ordered and reviewed.  Narrowing of joint space noted.  Minimal spurring.  Images reviewed with the patient.  All her questions were answered.  Patient no longer goes to physical therapy.  Recommending to " see orthopedics.  Possibly she would need an MRI as well to check for any meniscal damage or ligamentous injury.  Continue to use the knee sleeve.   Use over-the-counter pain medication as needed.    Follow up with Provider -1-2 months for annual examination.    Edgardo Mckeon MD  [facility]

## 2019-03-07 ENCOUNTER — SURGERY (OUTPATIENT)
Age: 54
End: 2019-03-07
Payer: COMMERCIAL

## 2019-03-07 ENCOUNTER — HOSPITAL ENCOUNTER (OUTPATIENT)
Facility: CLINIC | Age: 54
Discharge: HOME | End: 2019-03-07
Attending: INTERNAL MEDICINE | Admitting: INTERNAL MEDICINE
Payer: COMMERCIAL

## 2019-03-07 VITALS
RESPIRATION RATE: 18 BRPM | TEMPERATURE: 96.9 F | WEIGHT: 189 LBS | BODY MASS INDEX: 31.49 KG/M2 | DIASTOLIC BLOOD PRESSURE: 81 MMHG | OXYGEN SATURATION: 95 % | HEART RATE: 73 BPM | HEIGHT: 65 IN | SYSTOLIC BLOOD PRESSURE: 112 MMHG

## 2019-03-07 DIAGNOSIS — R06.02 SOB (SHORTNESS OF BREATH): ICD-10-CM

## 2019-03-07 DIAGNOSIS — R07.89 ATYPICAL CHEST PAIN: ICD-10-CM

## 2019-03-07 DIAGNOSIS — R94.39 ABNORMAL CARDIOVASCULAR STRESS TEST: ICD-10-CM

## 2019-03-07 DIAGNOSIS — E78.5 HYPERLIPIDEMIA LDL GOAL <100: ICD-10-CM

## 2019-03-07 DIAGNOSIS — Q24.5 ALCAPA (ANOMALOUS LEFT CORONARY ARTERY FROM THE PULMONARY ARTERY): ICD-10-CM

## 2019-03-07 PROBLEM — Z98.890 STATUS POST CORONARY ANGIOGRAM: Status: ACTIVE | Noted: 2019-03-07

## 2019-03-07 LAB
ANION GAP SERPL CALCULATED.3IONS-SCNC: 5 MMOL/L (ref 3–14)
B-HCG SERPL-ACNC: 4 IU/L (ref 0–5)
BUN SERPL-MCNC: 8 MG/DL (ref 7–30)
CALCIUM SERPL-MCNC: 8.4 MG/DL (ref 8.5–10.1)
CHLORIDE SERPL-SCNC: 111 MMOL/L (ref 94–109)
CO2 BLDCOV-SCNC: 27 MMOL/L (ref 21–28)
CO2 SERPL-SCNC: 28 MMOL/L (ref 20–32)
CREAT SERPL-MCNC: 0.62 MG/DL (ref 0.52–1.04)
ERYTHROCYTE [DISTWIDTH] IN BLOOD BY AUTOMATED COUNT: 12.1 % (ref 10–15)
GFR SERPL CREATININE-BSD FRML MDRD: >90 ML/MIN/{1.73_M2}
GLUCOSE SERPL-MCNC: 106 MG/DL (ref 70–99)
HCT VFR BLD AUTO: 40.5 % (ref 35–47)
HGB BLD-MCNC: 13.8 G/DL (ref 11.7–15.7)
MCH RBC QN AUTO: 29.6 PG (ref 26.5–33)
MCHC RBC AUTO-ENTMCNC: 34.1 G/DL (ref 31.5–36.5)
MCV RBC AUTO: 87 FL (ref 78–100)
PCO2 BLDV: 44 MM HG (ref 40–50)
PH BLDV: 7.4 PH (ref 7.32–7.43)
PLATELET # BLD AUTO: 265 10E9/L (ref 150–450)
PO2 BLDV: 34 MM HG (ref 25–47)
POTASSIUM SERPL-SCNC: 4.2 MMOL/L (ref 3.4–5.3)
RBC # BLD AUTO: 4.67 10E12/L (ref 3.8–5.2)
SAO2 % BLDV FROM PO2: 65 %
SODIUM SERPL-SCNC: 144 MMOL/L (ref 133–144)
WBC # BLD AUTO: 6.2 10E9/L (ref 4–11)

## 2019-03-07 PROCEDURE — 93457 R HRT ART/GRFT ANGIO: CPT | Performed by: INTERNAL MEDICINE

## 2019-03-07 PROCEDURE — 82803 BLOOD GASES ANY COMBINATION: CPT | Mod: 91

## 2019-03-07 PROCEDURE — 40000235 ZZH STATISTIC TELEMETRY

## 2019-03-07 PROCEDURE — 27210794 ZZH OR GENERAL SUPPLY STERILE: Performed by: INTERNAL MEDICINE

## 2019-03-07 PROCEDURE — 93010 ELECTROCARDIOGRAM REPORT: CPT | Performed by: INTERNAL MEDICINE

## 2019-03-07 PROCEDURE — 25000128 H RX IP 250 OP 636: Performed by: INTERNAL MEDICINE

## 2019-03-07 PROCEDURE — C1894 INTRO/SHEATH, NON-LASER: HCPCS | Performed by: INTERNAL MEDICINE

## 2019-03-07 PROCEDURE — 99152 MOD SED SAME PHYS/QHP 5/>YRS: CPT | Performed by: INTERNAL MEDICINE

## 2019-03-07 PROCEDURE — 25000125 ZZHC RX 250: Performed by: INTERNAL MEDICINE

## 2019-03-07 PROCEDURE — 80048 BASIC METABOLIC PNL TOTAL CA: CPT | Performed by: INTERNAL MEDICINE

## 2019-03-07 PROCEDURE — 25800030 ZZH RX IP 258 OP 636: Performed by: INTERNAL MEDICINE

## 2019-03-07 PROCEDURE — 93461 R&L HRT ART/VENTRICLE ANGIO: CPT | Performed by: INTERNAL MEDICINE

## 2019-03-07 PROCEDURE — 36415 COLL VENOUS BLD VENIPUNCTURE: CPT | Performed by: INTERNAL MEDICINE

## 2019-03-07 PROCEDURE — 84702 CHORIONIC GONADOTROPIN TEST: CPT | Performed by: INTERNAL MEDICINE

## 2019-03-07 PROCEDURE — 85027 COMPLETE CBC AUTOMATED: CPT | Performed by: INTERNAL MEDICINE

## 2019-03-07 PROCEDURE — 40000852 ZZH STATISTIC HEART CATH LAB OR EP LAB

## 2019-03-07 PROCEDURE — C1769 GUIDE WIRE: HCPCS | Performed by: INTERNAL MEDICINE

## 2019-03-07 PROCEDURE — 99153 MOD SED SAME PHYS/QHP EA: CPT | Performed by: INTERNAL MEDICINE

## 2019-03-07 PROCEDURE — 93005 ELECTROCARDIOGRAM TRACING: CPT

## 2019-03-07 PROCEDURE — 93457 R HRT ART/GRFT ANGIO: CPT | Mod: 26 | Performed by: INTERNAL MEDICINE

## 2019-03-07 PROCEDURE — 40000065 ZZH STATISTIC EKG NON-CHARGEABLE

## 2019-03-07 RX ORDER — NITROGLYCERIN 5 MG/ML
VIAL (ML) INTRAVENOUS
Status: DISCONTINUED | OUTPATIENT
Start: 2019-03-07 | End: 2019-03-07 | Stop reason: HOSPADM

## 2019-03-07 RX ORDER — POTASSIUM CHLORIDE 1500 MG/1
20 TABLET, EXTENDED RELEASE ORAL
Status: DISCONTINUED | OUTPATIENT
Start: 2019-03-07 | End: 2019-03-07 | Stop reason: HOSPADM

## 2019-03-07 RX ORDER — NALOXONE HYDROCHLORIDE 0.4 MG/ML
.2-.4 INJECTION, SOLUTION INTRAMUSCULAR; INTRAVENOUS; SUBCUTANEOUS
Status: DISCONTINUED | OUTPATIENT
Start: 2019-03-07 | End: 2019-03-07 | Stop reason: HOSPADM

## 2019-03-07 RX ORDER — VERAPAMIL HYDROCHLORIDE 2.5 MG/ML
INJECTION, SOLUTION INTRAVENOUS
Status: DISCONTINUED | OUTPATIENT
Start: 2019-03-07 | End: 2019-03-07 | Stop reason: HOSPADM

## 2019-03-07 RX ORDER — LORAZEPAM 2 MG/ML
0.5 INJECTION INTRAMUSCULAR
Status: DISCONTINUED | OUTPATIENT
Start: 2019-03-07 | End: 2019-03-07 | Stop reason: HOSPADM

## 2019-03-07 RX ORDER — IOPAMIDOL 755 MG/ML
INJECTION, SOLUTION INTRAVASCULAR
Status: DISCONTINUED | OUTPATIENT
Start: 2019-03-07 | End: 2019-03-07 | Stop reason: HOSPADM

## 2019-03-07 RX ORDER — SODIUM CHLORIDE 9 MG/ML
INJECTION, SOLUTION INTRAVENOUS CONTINUOUS
Status: DISCONTINUED | OUTPATIENT
Start: 2019-03-07 | End: 2019-03-07 | Stop reason: HOSPADM

## 2019-03-07 RX ORDER — FENTANYL CITRATE 50 UG/ML
25-50 INJECTION, SOLUTION INTRAMUSCULAR; INTRAVENOUS
Status: DISCONTINUED | OUTPATIENT
Start: 2019-03-07 | End: 2019-03-07 | Stop reason: HOSPADM

## 2019-03-07 RX ORDER — LIDOCAINE 40 MG/G
CREAM TOPICAL
Status: DISCONTINUED | OUTPATIENT
Start: 2019-03-07 | End: 2019-03-07 | Stop reason: HOSPADM

## 2019-03-07 RX ORDER — NALOXONE HYDROCHLORIDE 0.4 MG/ML
.1-.4 INJECTION, SOLUTION INTRAMUSCULAR; INTRAVENOUS; SUBCUTANEOUS
Status: DISCONTINUED | OUTPATIENT
Start: 2019-03-07 | End: 2019-03-07 | Stop reason: HOSPADM

## 2019-03-07 RX ORDER — ACETAMINOPHEN 325 MG/1
325-650 TABLET ORAL EVERY 4 HOURS PRN
Status: DISCONTINUED | OUTPATIENT
Start: 2019-03-07 | End: 2019-03-07 | Stop reason: HOSPADM

## 2019-03-07 RX ORDER — FENTANYL CITRATE 50 UG/ML
INJECTION, SOLUTION INTRAMUSCULAR; INTRAVENOUS
Status: DISCONTINUED | OUTPATIENT
Start: 2019-03-07 | End: 2019-03-07 | Stop reason: HOSPADM

## 2019-03-07 RX ORDER — HEPARIN SODIUM 1000 [USP'U]/ML
INJECTION, SOLUTION INTRAVENOUS; SUBCUTANEOUS
Status: DISCONTINUED | OUTPATIENT
Start: 2019-03-07 | End: 2019-03-07 | Stop reason: HOSPADM

## 2019-03-07 RX ORDER — ATROPINE SULFATE 0.1 MG/ML
0.5 INJECTION INTRAVENOUS EVERY 5 MIN PRN
Status: DISCONTINUED | OUTPATIENT
Start: 2019-03-07 | End: 2019-03-07 | Stop reason: HOSPADM

## 2019-03-07 RX ORDER — LORAZEPAM 0.5 MG/1
0.5 TABLET ORAL
Status: DISCONTINUED | OUTPATIENT
Start: 2019-03-07 | End: 2019-03-07 | Stop reason: HOSPADM

## 2019-03-07 RX ORDER — HYDROCODONE BITARTRATE AND ACETAMINOPHEN 5; 325 MG/1; MG/1
1-2 TABLET ORAL EVERY 4 HOURS PRN
Status: DISCONTINUED | OUTPATIENT
Start: 2019-03-07 | End: 2019-03-07 | Stop reason: HOSPADM

## 2019-03-07 RX ORDER — FLUMAZENIL 0.1 MG/ML
0.2 INJECTION, SOLUTION INTRAVENOUS
Status: DISCONTINUED | OUTPATIENT
Start: 2019-03-07 | End: 2019-03-07 | Stop reason: HOSPADM

## 2019-03-07 RX ADMIN — HEPARIN SODIUM 5000 UNITS: 1000 INJECTION, SOLUTION INTRAVENOUS; SUBCUTANEOUS at 09:14

## 2019-03-07 RX ADMIN — VERAPAMIL HYDROCHLORIDE 2.5 MG: 2.5 INJECTION, SOLUTION INTRAVENOUS at 09:13

## 2019-03-07 RX ADMIN — NITROGLYCERIN 200 MCG: 5 INJECTION, SOLUTION INTRAVENOUS at 09:12

## 2019-03-07 RX ADMIN — SODIUM CHLORIDE: 9 INJECTION, SOLUTION INTRAVENOUS at 07:16

## 2019-03-07 RX ADMIN — MIDAZOLAM 1 MG: 1 INJECTION INTRAMUSCULAR; INTRAVENOUS at 09:23

## 2019-03-07 RX ADMIN — FENTANYL CITRATE 50 MCG: 50 INJECTION, SOLUTION INTRAMUSCULAR; INTRAVENOUS at 09:22

## 2019-03-07 RX ADMIN — LIDOCAINE HYDROCHLORIDE 1 ML: 10 INJECTION, SOLUTION EPIDURAL; INFILTRATION; INTRACAUDAL; PERINEURAL at 09:08

## 2019-03-07 RX ADMIN — LIDOCAINE HYDROCHLORIDE 1 ML: 10 INJECTION, SOLUTION EPIDURAL; INFILTRATION; INTRACAUDAL; PERINEURAL at 09:04

## 2019-03-07 RX ADMIN — IOPAMIDOL 80 ML: 755 INJECTION, SOLUTION INTRAVENOUS at 09:35

## 2019-03-07 ASSESSMENT — MIFFLIN-ST. JEOR: SCORE: 1445.24

## 2019-03-07 NOTE — IP AVS SNAPSHOT
[place] [address] [address] [place] [address]  Phone:  [phone]                                    After Visit Summary   3/7/2019    Jesenia Fofana    MRN: [id number]           After Visit Summary Signature Page    I have received my discharge instructions, and my questions have been answered. I have discussed any challenges I see with this plan with the nurse or doctor.    ..........................................................................................................................................  Patient/Patient Representative Signature      ..........................................................................................................................................  Patient Representative Print Name and Relationship to Patient    ..................................................               ................................................  Date                                   Time    ..........................................................................................................................................  Reviewed by Signature/Title    ...................................................              ..............................................  Date                                               Time          22EPIC Rev 08/18

## 2019-03-07 NOTE — DISCHARGE INSTRUCTIONS
Cardiac Angiogram Discharge Instructions - Radial    After you go home:      Have an adult stay with you until tomorrow.    Drink extra fluids for 2 days.    You may resume your normal diet.    No smoking       For 24 hours - due to the sedation you received:    Relax and take it easy.    Do NOT make any important or legal decisions.    Do NOT drive or operate machines at home or at work.    Do NOT drink alcohol.    Care of Wrist Puncture Site:      For the first 24 hrs - check the puncture site every 1-2 hours while awake.    It is normal to have soreness at the puncture site and mild tingling in your hand for up to 3 days.    Remove the bandaid after 24 hours. If there is minor oozing, apply another bandaid and remove it after 12 hours.    You may shower tomorrow.  Do NOT take a bath, or use a hot tub or pool for at least 3 days. Do NOT scrub the site. Do not use lotion or powder near the puncture site.           Activity:        For 2 days:     do not use your hand or arm to support your weight (such as rising from a chair)     do not bend your wrist (such as lifting a garage door).    do not lift more than 5 pounds or exercise your arm (such as tennis, golf or bowling).    Do NOT do any heavy activity such as exercise, lifting, or straining.     Bleeding:      If you start bleeding from the site in your wrist, sit down and press firmly on/above the site for 10 minutes.     Once bleeding stops, keep arm still for 2 hours.     Call [facility] as soon as you can.       Call 911 right away if you have heavy bleeding or bleeding that does not stop.      Medicines:      If you are taking an antiplatelet medication such as Plavix, Brilinta or Effient, do not stop taking it until you talk to your cardiologist.        If you are on Metformin (Glucophage), do not restart it until you have blood tests (within 2 to 3 days after discharge).  After you have your blood drawn, you may restart the Metformin.     Take your  medications, including blood thinners, unless your provider tells you not to.  If you take Coumadin (Warfarin), have your INR checked by your provider in  3-5 days. Call your clinic to schedule this.    If you have stopped any medicines, check with your provider about when to restart them.    Follow Up Appointments:      Follow up with [facility] Nurse Practitioner at [facility] of patient preference in 7-10 days.    Call the clinic if:      You have a large or growing hard lump around the site.    The site is red, swollen, hot or tender.    Blood or fluid is draining from the site.    You have chills or a fever greater than 101 F (38 C).    Your arm feels numb, cool or changes color.    You have hives, a rash or unusual itching.    Any questions or concerns.          [facility] [place]:    [phone] [facility] (7 days a week)

## 2019-03-07 NOTE — PROGRESS NOTES
0945: Pt returned from Cath Lab. TR band intact to Left wrist.  No oozing or hematoma noted. Right internal jugular site dry and intact as well with no bleeding. Areas soft & flat. Pt denies pain. Pt instructed on activity restrictions with Left wrist. Verbal understanding received from pt. Pt's family at bedside. Detailed update given. Pt taking diet & flds well. No complaints. Air released from TR band per protocol. OOB - steady on feet. Ambulated in halls to bathroom with good pj. No change in puncture site assessment with activity. TR band removed. Bandaid applied to site. Area soft & flat. Discharge teaching & instructions given to both pt & spouse w/ verbal understanding received. All questions & concerns addressed.  Detailed report called to Chris Mitchell.

## 2019-03-07 NOTE — PROGRESS NOTES
Pt up and walked to restroom > +uriantion   Denies any CP -SOB   IV Dc'd  Sites remain dry and intact   VSS   Ready for discharge home with SO

## 2019-03-07 NOTE — IP AVS SNAPSHOT
MRN:[id number]                      After Visit Summary   3/7/2019    Jesenia Fofana    MRN: [id number]           Visit Information        Department      3/7/2019  6:32 AM [facility]          Review of your medicines      UNREVIEWED medicines. 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       Follow-ups after your visit       Your next 10 appointments already scheduled    Mar 15, 2019 10:00 AM CDT  Return Visit with CHRISTINA Nice CNP  [facility] ([facility]) [address] [place] [id number] [phone] OPT 2      Care Instructions       After Care Instructions     Discharge Instructions - IF on Metformin (Glucophage or Glucovance) or Metformin containing medications      IF on Metformin (Glucophage or Glucovance) or Metformin containing medications , schedule a Basic Metabolic Panel at [facility] or Primary Clinic in 48 - 72 hours post procedure and PRIOR TO resuming the Metformin or Metformin containing medications.  Hold Metformin (Glucophage or Glucovance) or Metformin containing medications until after the Basic Metabolic Panel on the 2nd or 3rd day following the procedure.  May resume after blood draw is complete.           Further instructions from your care team       Cardiac Angiogram Discharge Instructions - Radial    After you go home:      Have an  adult stay with you until tomorrow.    Drink extra fluids for 2 days.    You may resume your normal diet.    No smoking       For 24 hours - due to the sedation you received:    Relax and take it easy.    Do NOT make any important or legal decisions.    Do NOT drive or operate machines at home or at work.    Do NOT drink alcohol.    Care of Wrist Puncture Site:      For the first 24 hrs - check the puncture site every 1-2 hours while awake.    It is normal to have soreness at the puncture site and mild tingling in your hand for up to 3 days.    Remove the bandaid after 24 hours. If there is minor oozing, apply another bandaid and remove it after 12 hours.    You may shower tomorrow.  Do NOT take a bath, or use a hot tub or pool for at least 3 days. Do NOT scrub the site. Do not use lotion or powder near the puncture site.           Activity:        For 2 days:     do not use your hand or arm to support your weight (such as rising from a chair)     do not bend your wrist (such as lifting a garage door).    do not lift more than 5 pounds or exercise your arm (such as tennis, golf or bowling).    Do NOT do any heavy activity such as exercise, lifting, or straining.     Bleeding:      If you start bleeding from the site in your wrist, sit down and press firmly on/above the site for 10 minutes.     Once bleeding stops, keep arm still for 2 hours.     Call [facility] as soon as you can.       Call 911 right away if you have heavy bleeding or bleeding that does not stop.      Medicines:      If you are taking an antiplatelet medication such as Plavix, Brilinta or Effient, do not stop taking it until you talk to your cardiologist.        If you are on Metformin (Glucophage), do not restart it until you have blood tests (within 2 to 3 days after discharge).  After you have your blood drawn, you may restart the Metformin.     Take your medications, including blood thinners, unless your provider tells you not to.  If you take  "Coumadin (Warfarin), have your INR checked by your provider in  3-5 days. Call your clinic to schedule this.    If you have stopped any medicines, check with your provider about when to restart them.    Follow Up Appointments:      Follow up with [facility] Nurse Practitioner at [facility] of patient preference in 7-10 days.    Call the clinic if:      You have a large or growing hard lump around the site.    The site is red, swollen, hot or tender.    Blood or fluid is draining from the site.    You have chills or a fever greater than 101 F (38 C).    Your arm feels numb, cool or changes color.    You have hives, a rash or unusual itching.    Any questions or concerns.          [facility] [place]:    [phone] [facility] (7 days a week)            Additional Information About Your Visit       MyChart Information    [employer] gives you secure access to your electronic health record. If you see a primary care provider, you can also send messages to your care team and make appointments. If you have questions, please call your primary care clinic.  If you do not have a primary care provider, please call [phone] and they will assist you.       Care EveryWhere ID    This is your Care EveryWhere ID. This could be used by other organizations to access your [place] medical records  [id number]       Your Vitals Were     Blood Pressure   123/64          Pulse   74          Temperature   96.9  F (36.1  C) (Oral)          Respirations   16          Height   1.638 m (5' 4.5\")             Weight   85.7 kg (189 lb 0 oz)    Last Period   04/25/2011    Pulse Oximetry   95%    BMI (Body Mass Index)   31.94 kg/m           Primary Care Provider    [facility] MD Nilay      Equal Access to Services    CLAYTON CASTRO : Antelmo Moreira, zurdo rodríguez, stefanie franco. So [facility] [phone].    ATENCIÓN: Si annika rm " bradley disposición servicios gratuitos de asistencia lingüística. Yuki lima [phone].    We comply with applicable federal civil rights laws and [place] laws. We do not discriminate on the basis of race, color, national origin, age, disability, sex, sexual orientation, or gender identity.           Thank you!    Thank you for choosing [place] for your care. Our goal is always to provide you with excellent care. Hearing back from our patients is one way we can continue to improve our services. Please take a few minutes to complete the written survey that you may receive in the mail after you visit with us. Thank you!            Medication List      ASK your doctor about these medications          Morning Afternoon Evening Bedtime As Needed    ALEVE 220 MG capsule  INSTRUCTIONS:  Take 220 mg by mouth 2 times daily (with meals)  Generic drug:  naproxen sodium                     atorvastatin 20 MG tablet  Also known as:  LIPITOR  INSTRUCTIONS:  Take 1 tablet (20 mg) by mouth daily

## 2019-03-08 LAB
CO2 BLDCOV-SCNC: 24 MMOL/L (ref 21–28)
INTERPRETATION ECG - MUSE: NORMAL
PCO2 BLDV: 40 MM HG (ref 40–50)
PH BLDV: 7.39 PH (ref 7.32–7.43)
PO2 BLDV: 35 MM HG (ref 25–47)
SAO2 % BLDV FROM PO2: 67 %

## 2019-03-11 LAB — INTERPRETATION ECG - MUSE: NORMAL

## 2019-03-15 ENCOUNTER — OFFICE VISIT (OUTPATIENT)
Dept: CARDIOLOGY | Facility: CLINIC | Age: 54
End: 2019-03-15
Payer: COMMERCIAL

## 2019-03-15 VITALS
DIASTOLIC BLOOD PRESSURE: 84 MMHG | HEART RATE: 80 BPM | HEIGHT: 65 IN | WEIGHT: 188 LBS | BODY MASS INDEX: 31.32 KG/M2 | SYSTOLIC BLOOD PRESSURE: 121 MMHG

## 2019-03-15 DIAGNOSIS — R06.02 SOB (SHORTNESS OF BREATH): Primary | ICD-10-CM

## 2019-03-15 DIAGNOSIS — Q24.5 CORONARY ARTERY ANOMALY, CONGENITAL: ICD-10-CM

## 2019-03-15 PROCEDURE — 99214 OFFICE O/P EST MOD 30 MIN: CPT | Performed by: NURSE PRACTITIONER

## 2019-03-15 ASSESSMENT — MIFFLIN-ST. JEOR: SCORE: 1440.7

## 2019-03-15 NOTE — PATIENT INSTRUCTIONS
-Follow-up with Dr. Wilson.     If you have any questions or concerns, please call me.     Catina  [phone]

## 2019-03-15 NOTE — PROGRESS NOTES
HPI and Plan:   I had the pleasure of seeing Jesenia Fofana today in cardiology clinic follow up. She is a pleasant 53 year old patient of Dr. Wilson. 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 She then underwent surgery with Dr. Norris with closure of the proximal left coronary artery inside the pulmonary artery and then takedown of a LIMA and implementation to the LAD, and fistulous connection close between the RCA and LAD.     She saw Dr. Wilson in January 2019 with complaints of left-sided chest pain and shortness of breath. Echo from 1/2019 showed an EF of 45% with apical hypokinesis. She then underwent a CTA which was unchanged from 2011, showing the LAD was still connected to the pulmonary artery. The calcium score was 0. She also had a nuclear stress test with exercise which she tolerated well without symptoms. However the study demonstrated moderate to large, partially reversible apical, mid, and distal anterior lateral defect consistent with mild infarction with moderate dylon-infarct ischemia. Given the atypical chest pain and positive stress test, she underwent a left and right heart cath on 3/7/19. No atherosclerotic disease found but an aneurysm of LAD was noted as well as a massive RCA, LIMA to the LAD also without disease. Right heart pressures were normal.     Today she is feeling well since her angiogram. Denies any chest pain, shortness of breath, dizziness, lightheadedness, palpitations, or edema. Tolerating atorvastatin with great response. She does state that she needs a knee replacement, so is wondering when it would be appropriate to move forward with surgical plans.       Physical Exam  Please see Below     Assessment and Plan  1. History of ALCAPA diagnosed in 1996. She then underwent surgery with Dr. Norris with closure of the proximal left coronary artery inside the pulmonary artery and then takedown of a  LIMA and implementation to the LAD, and fistulous connection close between the RCA and LAD. Plan is to follow-up with Dr. Wilson, anticoagulation will be discussed at that time.  2. S/p left and right heart cath: No atherosclerotic disease found but an aneurysm of LAD was noted with a massive RCA, LIMA to the LAD also without disease. Right heart pressures were normal. ECHO on 1/10/19 demonstrated reduced LV function with an EF of 45%. Denies any recurrent chest pain or shortness of breath. Will follow-up with Dr. Wilson with to consider med management for reduced LV function and possible anticoagulation therapy given large RCA and aneurysm of LAD. Discussed that it would be reasonable to begin knee replacement work-up but will discuss further with Dr. Wilson at follow-up appointment.   3. Hyperlipidemia: Tolerating atorvastatin 20 mg daily with great response, LDL 74.       Thank you for allowing me to care for Jesenia Fofana today.    Ashley Julian, JANNETH student    Attestation:  Pt was seen and examined by me, I agree with above plan  CHRISTINA Herrera, CNP  Cardiology    Voice recognition software was used for this note, I have reviewed this note, but errors may have been missed.    No orders of the defined types were placed in this encounter.    No orders of the defined types were placed in this encounter.    There are no discontinued medications.      CURRENT MEDICATIONS:  Current Outpatient Medications   Medication Sig Dispense Refill     atorvastatin (LIPITOR) 20 MG tablet Take 1 tablet (20 mg) by mouth daily 90 tablet 3     naproxen sodium (ALEVE) 220 MG capsule Take 220 mg by mouth 2 times daily (with meals)         ALLERGIES     Allergies   Allergen Reactions     Aspirin Hives       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    abnl left coronary artery defect       PAST SURGICAL HISTORY:  Past  Surgical History:   Procedure Laterality Date     C NONSPECIFIC PROCEDURE      left coronary artery revision     CV RIGHT HEART CATH N/A 3/7/2019    Procedure: RHC with Coronay Angiogram;  Surgeon: Shikha Wilson MD;  Location:  HEART CARDIAC CATH LAB       FAMILY HISTORY:  Family History   Problem Relation Age of Onset     Heart Disease Maternal Grandfather         MI     Cancer - colorectal Paternal Grandfather      Diabetes Maternal Uncle      Thyroid Disease Sister         hypothyroidism     Neurologic Disorder Sister         MS diagnosed at 40y.o.       SOCIAL HISTORY:  Social History     Socioeconomic History     Marital status:      Spouse name: Adolfo     Number of children: 2     Years of education: 14 years     Highest education level: None   Occupational History     Occupation: management     Employer: [employer]     Comment: [employer] equipment   Social Needs     Financial resource strain: None     Food insecurity:     Worry: None     Inability: None     Transportation needs:     Medical: None     Non-medical: None   Tobacco Use     Smoking status: Former Smoker     Packs/day: 1.50     Years: 10.00     Pack years: 15.00     Types: Cigarettes     Last attempt to quit: 1991     Years since quittin.7     Smokeless tobacco: Never Used   Substance and Sexual Activity     Alcohol use: Yes     Alcohol/week: 0.0 oz     Comment: socially     Drug use: No     Sexual activity: Yes     Partners: Male   Lifestyle     Physical activity:     Days per week: None     Minutes per session: None     Stress: None   Relationships     Social connections:     Talks on phone: None     Gets together: None     Attends Yazidism service: None     Active member of club or organization: None     Attends meetings of clubs or organizations: None     Relationship status: None     Intimate partner violence:     Fear of current or ex partner: None     Emotionally abused: None     Physically  "abused: None     Forced sexual activity: None   Other Topics Concern      Service No     Blood Transfusions No     Caffeine Concern No     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 Not Asked   Social History Narrative     None       Review of Systems:  Skin:  Negative   rash on upper left chest   Eyes:  Positive for contacts    ENT:  Negative   cold symptoms  Respiratory:  Negative       Cardiovascular:  Negative for;palpitations;chest pain;edema;lightheadedness;dizziness;fatigue      Gastroenterology: Positive for constipation slight  Genitourinary:  Negative      Musculoskeletal:  Positive for arthritis;joint pain knees  Neurologic:  Negative      Psychiatric:  Negative      Heme/Lymph/Imm:  Negative      Endocrine:  Positive for thyroid disorder;hot flashes;night sweats Grave's disease    Physical Exam:  Vitals: /84   Pulse 80   Ht 1.638 m (5' 4.5\")   Wt 85.3 kg (188 lb)   LMP 04/25/2011   BMI 31.77 kg/m      Constitutional:  cooperative, alert and oriented, well developed, well nourished, in no acute distress        Skin:  warm and dry to the touch, no apparent skin lesions or masses noted     Right IJ and left radial site healing well. Mild ecchymosis at left radial site.     Head:           Eyes:           Lymph:      ENT:           Neck:  carotid pulses are full and equal bilaterally, JVP normal, no carotid bruit        Respiratory:  normal breath sounds, clear to auscultation, normal A-P diameter, normal symmetry, normal respiratory excursion, no use of accessory muscles         Cardiac: regular rhythm, normal S1/S2, no S3 or S4, apical impulse not displaced, " no murmurs, gallops or rubs                pulses full and equal, no bruits auscultated                   2+                    GI:           Extremities and Muscular Skeletal:  no deformities, clubbing, cyanosis, erythema observed              Neurological:           Psych:  Alert and Oriented x 3    Encounter Diagnoses   Name Primary?     SOB (shortness of breath) Yes     Coronary artery anomaly, congenital        Recent Lab Results:  LIPID RESULTS:  Lab Results   Component Value Date    CHOL 153 02/14/2019    HDL 48 (L) 02/14/2019    LDL 74 02/14/2019    TRIG 155 (H) 02/14/2019    CHOLHDLRATIO 4.4 03/11/2011       LIVER ENZYME RESULTS:  Lab Results   Component Value Date    AST 17 01/10/2019    ALT 31 01/10/2019       CBC RESULTS:  Lab Results   Component Value Date    WBC 6.2 03/07/2019    RBC 4.67 03/07/2019    HGB 13.8 03/07/2019    HCT 40.5 03/07/2019    MCV 87 03/07/2019    MCH 29.6 03/07/2019    MCHC 34.1 03/07/2019    RDW 12.1 03/07/2019     03/07/2019       BMP RESULTS:  Lab Results   Component Value Date     03/07/2019    POTASSIUM 4.2 03/07/2019    CHLORIDE 111 (H) 03/07/2019    CO2 28 03/07/2019    ANIONGAP 5 03/07/2019     (H) 03/07/2019    BUN 8 03/07/2019    CR 0.62 03/07/2019    GFRESTIMATED >90 03/07/2019    GFRESTBLACK >90 03/07/2019    JOSEPHINE 8.4 (L) 03/07/2019        A1C RESULTS:  No results found for: A1C    INR RESULTS:  Lab Results   Component Value Date    INR 1.04 04/15/2010           CC  No referring provider defined for this encounter.

## 2019-03-15 NOTE — LETTER
3/15/2019    [facility] MD [place]      RE: Jesenia Fofana       Dear Colleague,    I had the pleasure of seeing Jesenia Fofana in the [facility].    HPI and Plan:   I had the pleasure of seeing Jesenia Fofana today in cardiology clinic follow up. She is a pleasant 53 year old patient of Dr. Wilson. 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 She then underwent surgery with Dr. Norris with closure of the proximal left coronary artery inside the pulmonary artery and then takedown of a LIMA and implementation to the LAD, and fistulous connection close between the RCA and LAD.     She saw Dr. Wilson in January 2019 with complaints of left-sided chest pain and shortness of breath. Echo from 1/2019 showed an EF of 45% with apical hypokinesis. She then underwent a CTA which was unchanged from 2011, showing the LAD was still connected to the pulmonary artery. The calcium score was 0. She also had a nuclear stress test with exercise which she tolerated well without symptoms. However the study demonstrated moderate to large, partially reversible apical, mid, and distal anterior lateral defect consistent with mild infarction with moderate dylon-infarct ischemia. Given the atypical chest pain and positive stress test, she underwent a left and right heart cath on 3/7/19. No atherosclerotic disease found but an aneurysm of LAD was noted as well as a massive RCA, LIMA to the LAD also without disease. Right heart pressures were normal.     Today she is feeling well since her angiogram. Denies any chest pain, shortness of breath, dizziness, lightheadedness, palpitations, or edema. Tolerating atorvastatin with great response. She does state that she needs a knee replacement, so is wondering when it would be appropriate to move forward with surgical plans.       Physical Exam  Please see Below     Assessment and Plan  1.  History of ALCAPA diagnosed in 1996. She then underwent surgery with Dr. Norris with closure of the proximal left coronary artery inside the pulmonary artery and then takedown of a LIMA and implementation to the LAD, and fistulous connection close between the RCA and LAD. Plan is to follow-up with Dr. Wilson, anticoagulation will be discussed at that time.  2. S/p left and right heart cath: No atherosclerotic disease found but an aneurysm of LAD was noted with a massive RCA, LIMA to the LAD also without disease. Right heart pressures were normal. ECHO on 1/10/19 demonstrated reduced LV function with an EF of 45%. Denies any recurrent chest pain or shortness of breath. Will follow-up with Dr. Wilson with to consider med management for reduced LV function and possible anticoagulation therapy given large RCA and aneurysm of LAD. Discussed that it would be reasonable to begin knee replacement work-up but will discuss further with Dr. Wilson at follow-up appointment.   3. Hyperlipidemia: Tolerating atorvastatin 20 mg daily with great response, LDL 74.       Thank you for allowing me to care for Jesenia Fofana today.    Ashley Julian, JANNETH student    Attestation:  Pt was seen and examined by me, I agree with above plan  CHRISTINA Herrera, CNP  Cardiology    Voice recognition software was used for this note, I have reviewed this note, but errors may have been missed.    No orders of the defined types were placed in this encounter.    No orders of the defined types were placed in this encounter.    There are no discontinued medications.      CURRENT MEDICATIONS:  Current Outpatient Medications   Medication Sig Dispense Refill     atorvastatin (LIPITOR) 20 MG tablet Take 1 tablet (20 mg) by mouth daily 90 tablet 3     naproxen sodium (ALEVE) 220 MG capsule Take 220 mg by mouth 2 times daily (with meals)         ALLERGIES     Allergies   Allergen Reactions     Aspirin Hives       PAST MEDICAL HISTORY:  Past Medical  History:   Diagnosis Date     Grave's disease 2010     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)     abnl left coronary artery defect       PAST SURGICAL HISTORY:  Past Surgical History:   Procedure Laterality Date     C NONSPECIFIC PROCEDURE      left coronary artery revision     CV RIGHT HEART CATH N/A 3/7/2019    Procedure: RHC with Coronay Angiogram;  Surgeon: Shikha Wilson MD;  Location:  HEART CARDIAC CATH LAB       FAMILY HISTORY:  Family History   Problem Relation Age of Onset     Heart Disease Maternal Grandfather         MI     Cancer - colorectal Paternal Grandfather      Diabetes Maternal Uncle      Thyroid Disease Sister         hypothyroidism     Neurologic Disorder Sister         MS diagnosed at 40y.o.       SOCIAL HISTORY:  Social History     Socioeconomic History     Marital status:      Spouse name: Adolfo     Number of children: 2     Years of education: 14 years     Highest education level: None   Occupational History     Occupation: management     Employer: [employer]     Comment: [employer]   Social Needs     Financial resource strain: None     Food insecurity:     Worry: None     Inability: None     Transportation needs:     Medical: None     Non-medical: None   Tobacco Use     Smoking status: Former Smoker     Packs/day: 1.50     Years: 10.00     Pack years: 15.00     Types: Cigarettes     Last attempt to quit: 1991     Years since quittin.7     Smokeless tobacco: Never Used   Substance and Sexual Activity     Alcohol use: Yes     Alcohol/week: 0.0 oz     Comment: socially     Drug use: No     Sexual activity: Yes     Partners: Male   Lifestyle     Physical activity:     Days per week: None     Minutes per session: None     Stress: None   Relationships     Social connections:     Talks on phone: None     Gets together: None     Attends Christian service: None     Active member of club or organization: None     Attends  "meetings of clubs or organizations: None     Relationship status: None     Intimate partner violence:     Fear of current or ex partner: None     Emotionally abused: None     Physically abused: None     Forced sexual activity: None   Other Topics Concern      Service No     Blood Transfusions No     Caffeine Concern No     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 Not Asked   Social History Narrative     None       Review of Systems:  Skin:  Negative   rash on upper left chest   Eyes:  Positive for contacts    ENT:  Negative   cold symptoms  Respiratory:  Negative       Cardiovascular:  Negative for;palpitations;chest pain;edema;lightheadedness;dizziness;fatigue      Gastroenterology: Positive for constipation slight  Genitourinary:  Negative      Musculoskeletal:  Positive for arthritis;joint pain knees  Neurologic:  Negative      Psychiatric:  Negative      Heme/Lymph/Imm:  Negative      Endocrine:  Positive for thyroid disorder;hot flashes;night sweats Grave's disease    Physical Exam:  Vitals: /84   Pulse 80   Ht 1.638 m (5' 4.5\")   Wt 85.3 kg (188 lb)   LMP 04/25/2011   BMI 31.77 kg/m       Constitutional:  cooperative, alert and oriented, well developed, well nourished, in no acute distress        Skin:  warm and dry to the touch, no apparent skin lesions or masses noted     Right IJ and left radial site healing well. Mild ecchymosis at left radial site.     Head:           Eyes:           Lymph:      ENT:           Neck:  carotid pulses are full and equal bilaterally, JVP normal, no carotid bruit        Respiratory:  normal breath sounds, clear to " auscultation, normal A-P diameter, normal symmetry, normal respiratory excursion, no use of accessory muscles         Cardiac: regular rhythm, normal S1/S2, no S3 or S4, apical impulse not displaced, no murmurs, gallops or rubs                pulses full and equal, no bruits auscultated                   2+                    GI:           Extremities and Muscular Skeletal:  no deformities, clubbing, cyanosis, erythema observed              Neurological:           Psych:  Alert and Oriented x 3    Encounter Diagnoses   Name Primary?     SOB (shortness of breath) Yes     Coronary artery anomaly, congenital        Recent Lab Results:  LIPID RESULTS:  Lab Results   Component Value Date    CHOL 153 02/14/2019    HDL 48 (L) 02/14/2019    LDL 74 02/14/2019    TRIG 155 (H) 02/14/2019    CHOLHDLRATIO 4.4 03/11/2011       LIVER ENZYME RESULTS:  Lab Results   Component Value Date    AST 17 01/10/2019    ALT 31 01/10/2019       CBC RESULTS:  Lab Results   Component Value Date    WBC 6.2 03/07/2019    RBC 4.67 03/07/2019    HGB 13.8 03/07/2019    HCT 40.5 03/07/2019    MCV 87 03/07/2019    MCH 29.6 03/07/2019    MCHC 34.1 03/07/2019    RDW 12.1 03/07/2019     03/07/2019       BMP RESULTS:  Lab Results   Component Value Date     03/07/2019    POTASSIUM 4.2 03/07/2019    CHLORIDE 111 (H) 03/07/2019    CO2 28 03/07/2019    ANIONGAP 5 03/07/2019     (H) 03/07/2019    BUN 8 03/07/2019    CR 0.62 03/07/2019    GFRESTIMATED >90 03/07/2019    GFRESTBLACK >90 03/07/2019    JOSEPHINE 8.4 (L) 03/07/2019        A1C RESULTS:  No results found for: A1C    INR RESULTS:  Lab Results   Component Value Date    INR 1.04 04/15/2010           CC  No referring provider defined for this encounter.                    Thank you for allowing me to participate in the care of your patient.      Sincerely,     CHRISTINA Lee [facility]

## 2019-04-05 ASSESSMENT — ENCOUNTER SYMPTOMS
ARTHRALGIAS: 1
STIFFNESS: 1
JOINT SWELLING: 1
MUSCLE WEAKNESS: 0
MUSCLE CRAMPS: 0
NECK PAIN: 0
MYALGIAS: 1
BACK PAIN: 0

## 2019-04-08 ENCOUNTER — DOCUMENTATION ONLY (OUTPATIENT)
Dept: CARE COORDINATION | Facility: CLINIC | Age: 54
End: 2019-04-08

## 2019-04-09 ENCOUNTER — OFFICE VISIT (OUTPATIENT)
Dept: CARDIOLOGY | Facility: CLINIC | Age: 54
End: 2019-04-09
Attending: INTERNAL MEDICINE
Payer: COMMERCIAL

## 2019-04-09 VITALS
BODY MASS INDEX: 31.44 KG/M2 | WEIGHT: 188.7 LBS | HEART RATE: 80 BPM | HEIGHT: 65 IN | DIASTOLIC BLOOD PRESSURE: 83 MMHG | SYSTOLIC BLOOD PRESSURE: 123 MMHG | OXYGEN SATURATION: 95 %

## 2019-04-09 DIAGNOSIS — Q24.5 ALCAPA (ANOMALOUS LEFT CORONARY ARTERY FROM THE PULMONARY ARTERY): Primary | ICD-10-CM

## 2019-04-09 PROCEDURE — 99213 OFFICE O/P EST LOW 20 MIN: CPT | Mod: ZP | Performed by: INTERNAL MEDICINE

## 2019-04-09 PROCEDURE — G0463 HOSPITAL OUTPT CLINIC VISIT: HCPCS | Mod: ZF

## 2019-04-09 RX ORDER — METOPROLOL SUCCINATE 25 MG/1
25 TABLET, EXTENDED RELEASE ORAL DAILY
Qty: 90 TABLET | Refills: 3 | Status: SHIPPED | OUTPATIENT
Start: 2019-04-09 | End: 2020-03-31

## 2019-04-09 ASSESSMENT — MIFFLIN-ST. JEOR: SCORE: 1443.88

## 2019-04-09 ASSESSMENT — PAIN SCALES - GENERAL: PAINLEVEL: NO PAIN (0)

## 2019-04-09 NOTE — NURSING NOTE
Cardiac Testing: Patient given instructions regarding  echocardiogram . Discussed purpose, preparation, procedure and when to expect results reported back to the patient. Patient demonstrated understanding of this information and agreed to call with further questions or concerns.    Med Reconcile: Reviewed and verified all current medications with the patient. The updated medication list was printed and given to the patient.    New Medication: Eliquis 5 mg BID.  Metoprolol XL 25 daily. Patient was educated regarding newly prescribed medication, including discussion of  the indication, administration, side effects, and when to report to MD or RN. Patient demonstrated understanding of this information and agreed to call with further questions or concerns.    Return Appointment: Follow up in one year with an echo. Patient given instructions regarding scheduling next clinic visit. Patient demonstrated understanding of this information and agreed to call with further questions or concerns.    Patient stated she understood all health information given and agreed to call with further questions or concerns.    Aryan Puentes, RN  RN Care Coordinator  [facility]  [phone]

## 2019-04-09 NOTE — PROGRESS NOTES
HPI:     Please see dictated note    PAST MEDICAL HISTORY:  Past Medical History:   Diagnosis Date     Grave's disease 2010     Other specified congenital anomaly of heart(016.00)     abnl left coronary artery defect       CURRENT MEDICATIONS:  Current Outpatient Medications   Medication Sig Dispense Refill     atorvastatin (LIPITOR) 20 MG tablet Take 1 tablet (20 mg) by mouth daily 90 tablet 3     naproxen sodium (ALEVE) 220 MG capsule Take 220 mg by mouth 2 times daily (with meals)         PAST SURGICAL HISTORY:  Past Surgical History:   Procedure Laterality Date     C NONSPECIFIC PROCEDURE      left coronary artery revision     CV RIGHT HEART CATH N/A 3/7/2019    Procedure: RHC with Coronay Angiogram;  Surgeon: Shikha Wilson MD;  Location:  HEART CARDIAC CATH LAB       ALLERGIES     Allergies   Allergen Reactions     Aspirin Hives       FAMILY HISTORY:  Family History   Problem Relation Age of Onset     Heart Disease Maternal Grandfather         MI     Cancer - colorectal Paternal Grandfather      Diabetes Maternal Uncle      Thyroid Disease Sister         hypothyroidism     Neurologic Disorder Sister         MS diagnosed at 40y.o.       SOCIAL HISTORY:  Social History     Socioeconomic History     Marital status:      Spouse name: Adolfo     Number of children: 2     Years of education: 14 years     Highest education level: Not on file   Occupational History     Occupation: management     Employer: [employer]     Comment: [employer]   Social Needs     Financial resource strain: Not on file     Food insecurity:     Worry: Not on file     Inability: Not on file     Transportation needs:     Medical: Not on file     Non-medical: Not on file   Tobacco Use     Smoking status: Former Smoker     Packs/day: 1.50     Years: 10.00     Pack years: 15.00     Types: Cigarettes     Last attempt to quit: 1991     Years since quittin.7     Smokeless tobacco: Never Used  "  Substance and Sexual Activity     Alcohol use: Yes     Alcohol/week: 0.0 oz     Comment: socially     Drug use: No     Sexual activity: Yes     Partners: Male   Lifestyle     Physical activity:     Days per week: Not on file     Minutes per session: Not on file     Stress: Not on file   Relationships     Social connections:     Talks on phone: Not on file     Gets together: Not on file     Attends Christianity service: Not on file     Active member of club or organization: Not on file     Attends meetings of clubs or organizations: Not on file     Relationship status: Not on file     Intimate partner violence:     Fear of current or ex partner: Not on file     Emotionally abused: Not on file     Physically abused: Not on file     Forced sexual activity: Not on file   Other Topics Concern      Service No     Blood Transfusions No     Caffeine Concern No     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 Not Asked   Social History Narrative     Not on file       ROS:   Constitutional: No fever, chills, or sweats. No weight gain/loss   ENT: No visual disturbance, ear ache, epistaxis, sore throat  Allergies/Immunologic: Negative.   Respiratory: No cough, hemoptysia  Cardiovascular: As per HPI  GI: No nausea, vomiting, hematemesis, melena, or hematochezia  : No urinary frequency, dysuria, or hematuria  Integument: Negative  Psychiatric: Negative  Neuro: Negative  Endocrinology: Negative   Musculoskeletal: Negative    EXAM:  /83 (BP Location: Left arm, Patient Position: Chair, Cuff Size: Adult Large)   Pulse 80   Ht 1.638 m (5' 4.5\")   Wt 85.6 kg (188 lb 11.2 oz)   LMP " 04/25/2011   SpO2 95%   BMI 31.89 kg/m    In general, the patient is a pleasant female in no apparent distress.      Labs:  LIPID RESULTS:  Lab Results   Component Value Date    CHOL 153 02/14/2019    HDL 48 (L) 02/14/2019    LDL 74 02/14/2019    TRIG 155 (H) 02/14/2019    CHOLHDLRATIO 4.4 03/11/2011    NHDL 105 02/14/2019       LIVER ENZYME RESULTS:  Lab Results   Component Value Date    AST 17 01/10/2019    ALT 31 01/10/2019       CBC RESULTS:  Lab Results   Component Value Date    WBC 6.2 03/07/2019    RBC 4.67 03/07/2019    HGB 13.8 03/07/2019    HCT 40.5 03/07/2019    MCV 87 03/07/2019    MCH 29.6 03/07/2019    MCHC 34.1 03/07/2019    RDW 12.1 03/07/2019     03/07/2019       BMP RESULTS:  Lab Results   Component Value Date     03/07/2019    POTASSIUM 4.2 03/07/2019    CHLORIDE 111 (H) 03/07/2019    CO2 28 03/07/2019    ANIONGAP 5 03/07/2019     (H) 03/07/2019    BUN 8 03/07/2019    CR 0.62 03/07/2019    GFRESTIMATED >90 03/07/2019    GFRESTBLACK >90 03/07/2019    JOSEPHINE 8.4 (L) 03/07/2019        A1C RESULTS:  No results found for: A1C    INR RESULTS:  Lab Results   Component Value Date    INR 1.04 04/15/2010     ASIA Wilson MD       CC  Patient Care Team:  Sharon Pemberton MD as PCP - Aubrie Ballard RN as Nurse Coordinator (Cardiology)  Shikha Wilson MD as MD (Cardiology)  Edgardo Mckeon MD as Assigned PCP  SHIKHA WILSON

## 2019-04-09 NOTE — LETTER
4/9/2019      RE: Jesenia Fofana  [address] [facility] [address]       Dear Colleague,    Thank you for the opportunity to participate in the care of your patient, Jesenia Fofana, at the [facility]. Please see a copy of my visit note below.    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    abnl left coronary artery defect       CURRENT MEDICATIONS:  Current Outpatient Medications   Medication Sig Dispense Refill     atorvastatin (LIPITOR) 20 MG tablet Take 1 tablet (20 mg) by mouth daily 90 tablet 3     naproxen sodium (ALEVE) 220 MG capsule Take 220 mg by mouth 2 times daily (with meals)         PAST SURGICAL HISTORY:  Past Surgical History:   Procedure Laterality Date     C NONSPECIFIC PROCEDURE  1997    left coronary artery revision     CV RIGHT HEART CATH N/A 3/7/2019    Procedure: RHC with Coronay Angiogram;  Surgeon: Shikha Wilson MD;  Location:  HEART CARDIAC CATH LAB       ALLERGIES     Allergies   Allergen Reactions     Aspirin Hives       FAMILY HISTORY:  Family History   Problem Relation Age of Onset     Heart Disease Maternal Grandfather         MI     Cancer - colorectal Paternal Grandfather      Diabetes Maternal Uncle      Thyroid Disease Sister         hypothyroidism     Neurologic Disorder Sister         MS diagnosed at 40y.o.       SOCIAL HISTORY:  Social History     Socioeconomic History     Marital status:      Spouse name: Adolfo     Number of children: 2     Years of education: 14 years     Highest education level: Not on file   Occupational History     Occupation: management     Employer: [employer]     Comment: [employer]   Social Needs     Financial resource strain: Not on file     Food insecurity:     Worry: Not on file     Inability: Not on  file     Transportation needs:     Medical: Not on file     Non-medical: Not on file   Tobacco Use     Smoking status: Former Smoker     Packs/day: 1.50     Years: 10.00     Pack years: 15.00     Types: Cigarettes     Last attempt to quit: 1991     Years since quittin.7     Smokeless tobacco: Never Used   Substance and Sexual Activity     Alcohol use: Yes     Alcohol/week: 0.0 oz     Comment: socially     Drug use: No     Sexual activity: Yes     Partners: Male   Lifestyle     Physical activity:     Days per week: Not on file     Minutes per session: Not on file     Stress: Not on file   Relationships     Social connections:     Talks on phone: Not on file     Gets together: Not on file     Attends Holiness service: Not on file     Active member of club or organization: Not on file     Attends meetings of clubs or organizations: Not on file     Relationship status: Not on file     Intimate partner violence:     Fear of current or ex partner: Not on file     Emotionally abused: Not on file     Physically abused: Not on file     Forced sexual activity: Not on file   Other Topics Concern      Service No     Blood Transfusions No     Caffeine Concern No     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 Not Asked   Social History Narrative     Not on file       ROS:   Constitutional: No fever, chills, or sweats. No weight gain/loss   ENT: No visual disturbance, ear ache, epistaxis, sore throat  Allergies/Immunologic: Negative.   Respiratory: No cough, hemoptysia  Cardiovascular: As per HPI  GI: No nausea, vomiting, hematemesis, melena, or hematochezia  : No urinary  "frequency, dysuria, or hematuria  Integument: Negative  Psychiatric: Negative  Neuro: Negative  Endocrinology: Negative   Musculoskeletal: Negative    EXAM:  /83 (BP Location: Left arm, Patient Position: Chair, Cuff Size: Adult Large)   Pulse 80   Ht 1.638 m (5' 4.5\")   Wt 85.6 kg (188 lb 11.2 oz)   LMP 04/25/2011   SpO2 95%   BMI 31.89 kg/m     In general, the patient is a pleasant female in no apparent distress.      Labs:  LIPID RESULTS:  Lab Results   Component Value Date    CHOL 153 02/14/2019    HDL 48 (L) 02/14/2019    LDL 74 02/14/2019    TRIG 155 (H) 02/14/2019    CHOLHDLRATIO 4.4 03/11/2011    NHDL 105 02/14/2019       LIVER ENZYME RESULTS:  Lab Results   Component Value Date    AST 17 01/10/2019    ALT 31 01/10/2019       CBC RESULTS:  Lab Results   Component Value Date    WBC 6.2 03/07/2019    RBC 4.67 03/07/2019    HGB 13.8 03/07/2019    HCT 40.5 03/07/2019    MCV 87 03/07/2019    MCH 29.6 03/07/2019    MCHC 34.1 03/07/2019    RDW 12.1 03/07/2019     03/07/2019       BMP RESULTS:  Lab Results   Component Value Date     03/07/2019    POTASSIUM 4.2 03/07/2019    CHLORIDE 111 (H) 03/07/2019    CO2 28 03/07/2019    ANIONGAP 5 03/07/2019     (H) 03/07/2019    BUN 8 03/07/2019    CR 0.62 03/07/2019    GFRESTIMATED >90 03/07/2019    GFRESTBLACK >90 03/07/2019    JOSEPHINE 8.4 (L) 03/07/2019        A1C RESULTS:  No results found for: A1C    INR RESULTS:  Lab Results   Component Value Date    INR 1.04 04/15/2010     ASIA Wilson MD       CC  Patient Care Team:  Sharon Pemberton MD as PCP - Aubrie Ballard, RN as Nurse Coordinator (Cardiology)  Shikha Wilson MD as MD (Cardiology)  Edgardo Mckeon MD as Assigned PCP  SHIKHA WILSON    Service Date: 04/09/2019      HISTORY OF PRESENT ILLNESS:  Ms. Fofana is a delightful, 53-year-old woman with a history of ALCAPA; please see my previous note from January for details.  I ended up cathing her given she had " some chest pressure with exertion, and she had a positive nuclear stress test in January of this year.  The cath showed that she had a ligated left main, and then they plugged a LIMA into the mid LAD.  She has an aneurysm at the implantation site of the LIMA in her LAD, and she has collaterals from her massive right coronary artery.  Her coronary artery is between 9-11 mm, so quite enlarged, but there is nothing that would need intervention or a surgery could repair.  She has been feeling well and is hoping to get her left knee replaced sometime in the spring or summer.  She has good blood pressure control.  Her heart rate is in the 80s.  She is currently not on any blood pressure medications.      IMPRESSION, REPORT AND PLAN:   1.  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 Norris with repair, including ligation of the left main and a LIMA to the LAD.   2.  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   3.  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   4.  History of thyrotoxicosis.      DISCUSSION:  It was a pleasure to see Ms. Fofana in followup.  Clinically, she is doing well.  She is looking forward to getting her knee replaced now that this has all been resolved at this time.  We discussed options for management from here on out and discussed the possibility of starting her on anticoagulant, extrapolating data from Kawasaki patients, and she is in agreement with this.  We would not need to bridge her.  She could stop it for her knee surgery without bridging, but it would be continued at other times.  We did discuss the increased risk of bleeding, and there is not definitive data in her specific situation.  We decided to do Eliquis 5 mg twice a day.  Furthermore, we discussed doing a low-dose beta blocker of Toprol XL 25 a day, which she is in agreement with.  There are several  reasons to do this, including lowering the heart rate and increasing coronary perfusion and then also her mild cardiomyopathy.  We will plan to see her back with an echo or sooner if there are any issues in the interim.  She understands and is in agreement with the plan as outlined.  From a cardiovascular standpoint, she is okay to have her knee surgery and does not need any further evaluation prior.  I would take beta blockers on the morning of the surgery, however.        It was a pleasure to see her.  Please do not hesitate to contact me with any questions or concerns.         MARCIAL CRAIG MD             D: 2019   T: 2019   MT: kamilah      Name:     MEDINA PIZARRO   MRN:      [id number]        Account:      [id number]   :      1965           Service Date: 2019      Document: [id number]

## 2019-04-09 NOTE — PATIENT INSTRUCTIONS
"You were seen today in the Adult Congenital and Cardiovascular Genetics Clinic at the [facility].    Cardiology Providers you saw during your visit:  Dr Genaro Wilson    Diagnosis:  Anomalous left coronary artery     Results:  No testing at this time.     Recommendations:    1.  Continue to eat a heart healthy, low salt diet.  2.  Continue to get 20-30 minutes of aerobic activity, 4-5 days per week.  Examples of aerobic activity include walking, running, swimming, cycling, etc.  3.  Continue to observe good oral hygiene, with regular dental visits.  4.  Please start taking Eliquis 5 mg 1 tablet twice a day  5.  Please start taking Metoprolol XL 25 mg 1 tablet daily.       Vitals:    04/09/19 0925   BP: 123/83   BP Location: Left arm   Patient Position: Chair   Cuff Size: Adult Large   Pulse: 80   SpO2: 95%   Weight: 85.6 kg (188 lb 11.2 oz)   Height: 1.638 m (5' 4.5\")       SBE prophylaxis:   Yes____  No__x__    Lifelong Bacterial Endocarditis Prophylaxis:  YES____  NO____    If YES is checked, follow the recommendations outlined below:   1. Take antibiotic(s) prior to recommended dental procedures and procedures on the respiratory tract or with infected skin, muscle or bones. SBE prophylaxis is not needed for routine GI and  procedures (ie. Colonoscopy or vaginal delivery)   2. Observe good oral hygiene daily, as advised by your dentist. Get regular professional dental care.   3. Keep cuts clean.   4. Infections should be treated promptly.   5. Symptoms of Infective Endocarditis could include: fever lasting more than 4-5 days or a recurrent fever that initially resolves but returns within 1-2 days)     Exercise restrictions:   Yes____  No_x___         If yes, list restrictions:  Must be allowed to rest if fatigued or SOB      Work restrictions:  Yes____  No_x___         If yes, list restrictions:    FASTING CHOLESTEROL was checked in the last 5 years YES_x__  NO___  2019  Continue to eat a heart " healthy, low salt diet.         ____ Fasting lipid panel order today         ____ No changes in medications          ____ I recommend the following changes in your cholesterol medications.:          ____ Please follow up for cholesterol screening at your primary care physician      Follow-up:  Follow up with Dr Wilson in 1 year with an echocardiogram.    For after hours urgent needs, call [phone] and ask to speak to the Adult Congenital Physician on call.  Mention Job Code 0401.    For emergencies call 291.    For any scheduling needs, please call [facility] , at [phone]  Thank you for your visit today!  If you have questions or concerns about today's visit, please call me.    Aryan Puentes, RN, BSN  Cardiology Care Coordinator  [facility]  [phone]    [address] [place] [address]  Mail Code [id number]  [place] [address]

## 2019-04-09 NOTE — NURSING NOTE
Chief Complaint   Patient presents with     Follow Up     reason for visit: heart problem -- 52 yo female with PMH significant for anomalous coronary arteries s/p repair 2000 (no LAC) presenting for evaluation     Vitals were taken and medications were reconciled.   SHILOH Bautista  9:27 AM

## 2019-04-09 NOTE — PROGRESS NOTES
Service Date: 04/09/2019      HISTORY OF PRESENT ILLNESS:  Ms. Fofana is a delightful, 53-year-old woman with a history of ALCAPA; please see my previous note from January for details.  I ended up cathing her given she had some chest pressure with exertion, and she had a positive nuclear stress test in January of this year.  The cath showed that she had a ligated left main, and then they plugged a LIMA into the mid LAD.  She has an aneurysm at the implantation site of the LIMA in her LAD, and she has collaterals from her massive right coronary artery.  Her coronary artery is between 9-11 mm, so quite enlarged, but there is nothing that would need intervention or a surgery could repair.  She has been feeling well and is hoping to get her left knee replaced sometime in the spring or summer.  She has good blood pressure control.  Her heart rate is in the 80s.  She is currently not on any blood pressure medications.      IMPRESSION, REPORT AND PLAN:   1.  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 Norris with repair, including ligation of the left main and a LIMA to the LAD.   2.  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   3.  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   4.  History of thyrotoxicosis.      DISCUSSION:  It was a pleasure to see Ms. Fofana in followup.  Clinically, she is doing well.  She is looking forward to getting her knee replaced now that this has all been resolved at this time.  We discussed options for management from here on out and discussed the possibility of starting her on anticoagulant, extrapolating data from Kawasaki patients, and she is in agreement with this.  We would not need to bridge her.  She could stop it for her knee surgery without bridging, but it would be continued at other times.  We did discuss the increased risk of bleeding, and there is not  definitive data in her specific situation.  We decided to do Eliquis 5 mg twice a day.  Furthermore, we discussed doing a low-dose beta blocker of Toprol XL 25 a day, which she is in agreement with.  There are several reasons to do this, including lowering the heart rate and increasing coronary perfusion and then also her mild cardiomyopathy.  We will plan to see her back with an echo or sooner if there are any issues in the interim.  She understands and is in agreement with the plan as outlined.  From a cardiovascular standpoint, she is okay to have her knee surgery and does not need any further evaluation prior.  I would take beta blockers on the morning of the surgery, however.        It was a pleasure to see her.  Please do not hesitate to contact me with any questions or concerns.         MARCIAL CRAIG MD             D: 2019   T: 2019   MT: kamilah      Name:     MEDINA PIZARRO   MRN:      [id number]        Account:      [id number]   :      1965           Service Date: 2019      Document: [id number]

## 2019-04-18 ENCOUNTER — TRANSFERRED RECORDS (OUTPATIENT)
Dept: HEALTH INFORMATION MANAGEMENT | Facility: CLINIC | Age: 54
End: 2019-04-18

## 2019-08-26 PROBLEM — M25.562 ACUTE PAIN OF LEFT KNEE: Status: RESOLVED | Noted: 2018-11-20 | Resolved: 2019-08-26

## 2019-09-11 ENCOUNTER — OFFICE VISIT (OUTPATIENT)
Dept: FAMILY MEDICINE | Facility: CLINIC | Age: 54
End: 2019-09-11
Payer: COMMERCIAL

## 2019-09-11 VITALS
DIASTOLIC BLOOD PRESSURE: 64 MMHG | RESPIRATION RATE: 14 BRPM | TEMPERATURE: 97.8 F | SYSTOLIC BLOOD PRESSURE: 120 MMHG | BODY MASS INDEX: 31.82 KG/M2 | HEIGHT: 65 IN | OXYGEN SATURATION: 97 % | HEART RATE: 82 BPM | WEIGHT: 191 LBS

## 2019-09-11 DIAGNOSIS — S80.12XA CONTUSION OF LEFT LOWER LEG, INITIAL ENCOUNTER: Primary | ICD-10-CM

## 2019-09-11 DIAGNOSIS — Z12.11 SPECIAL SCREENING FOR MALIGNANT NEOPLASMS, COLON: ICD-10-CM

## 2019-09-11 PROCEDURE — 99213 OFFICE O/P EST LOW 20 MIN: CPT | Performed by: FAMILY MEDICINE

## 2019-09-11 ASSESSMENT — MIFFLIN-ST. JEOR: SCORE: 1449.31

## 2019-09-11 NOTE — PROGRESS NOTES
Subjective     Jesenia Fofana is a 54 year old female who presents to clinic today for the following health issues:    HPI   Concern - lump  Onset: x one month    Description:   Lump on left lower leg.  Hit on side of boat about one month ago.    Intensity: mild    Progression of Symptoms:  improved    Accompanying Signs & Symptoms:  Size of the lump is a little smaller and is less painful.  But the lump is still there and it is  slightly.  Wonders why it is not going away completely.      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)     SOB (shortness of breath)     Atypical chest pain     Abnormal cardiovascular stress test     Status post coronary angiogram     Past Surgical History:   Procedure Laterality Date     C NONSPECIFIC PROCEDURE      left coronary artery revision     CV RIGHT HEART CATH N/A 3/7/2019    Procedure: RHC with Coronay Angiogram;  Surgeon: Shikha Wilson MD;  Location:  HEART CARDIAC CATH LAB       Social History     Tobacco Use     Smoking status: Former Smoker     Packs/day: 1.50     Years: 10.00     Pack years: 15.00     Types: Cigarettes     Last attempt to quit: 1991     Years since quittin.1     Smokeless tobacco: Never Used   Substance Use Topics     Alcohol use: Yes     Alcohol/week: 0.0 oz     Comment: socially     Family History   Problem Relation Age of Onset     Heart Disease Maternal Grandfather         MI     Cancer - colorectal Paternal Grandfather      Diabetes Maternal Uncle      Thyroid Disease Sister         hypothyroidism     Neurologic Disorder Sister         MS diagnosed at 40y.o.         Current Outpatient Medications   Medication Sig Dispense Refill     apixaban ANTICOAGULANT (ELIQUIS)  "5 MG tablet Take 1 tablet (5 mg) by mouth 2 times daily 180 tablet 3     atorvastatin (LIPITOR) 20 MG tablet Take 1 tablet (20 mg) by mouth daily 90 tablet 3     metoprolol succinate ER (TOPROL-XL) 25 MG 24 hr tablet Take 1 tablet (25 mg) by mouth daily 90 tablet 3     naproxen sodium (ALEVE) 220 MG capsule Take 220 mg by mouth 2 times daily (with meals)       Allergies   Allergen Reactions     Aspirin Hives         Reviewed and updated as needed this visit by Provider         Review of Systems   ROS COMP: CONSTITUTIONAL: NEGATIVE for fever, chills, change in weight  ENT/MOUTH: NEGATIVE for ear, mouth and throat problems  RESP: NEGATIVE for significant cough or SOB  CV: NEGATIVE for chest pain, palpitations or peripheral edema      Objective    /64   Pulse 82   Temp 97.8  F (36.6  C) (Tympanic)   Resp 14   Ht 1.638 m (5' 4.5\")   Wt 86.6 kg (191 lb)   LMP 04/25/2011   SpO2 97%   BMI 32.28 kg/m    Body mass index is 32.28 kg/m .  Physical Exam   GENERAL: healthy, alert and no distress  NECK: no adenopathy, no asymmetry, masses, or scars and thyroid normal to palpation  RESP: lungs clear to auscultation - no rales, rhonchi or wheezes  CV: regular rate and rhythm, normal S1 S2, no S3 or S4, no murmur, click or rub, no peripheral edema and peripheral pulses strong  ABDOMEN: soft, nontender, no hepatosplenomegaly, no masses and bowel sounds normal  MS: No edema or calf tenderness.  Patient is noted to have slightly hyperpigmented raised area on the upper part of the left shin.  Mild tenderness.  Slightly indurated on the edges.  No fluctuation.            Assessment & Plan     1. Contusion of left lower leg, initial encounter  Patient sustained a contusion in the area of lump which has likely caused a hematoma.  It has been about a month now.  Swelling has resolved but the lump never went away completely.  Patient is reassured that it should slowly dissolve on its own in the next 1 to 2 months.  She can ice " "the affected area.  She is able to walk without any pain.  Overall symptoms are gradually improving.  Patient reassured that she does not have any signs or symptoms of blood clot.  Patient verbalized agreement and understanding.    2. Special screening for malignant neoplasms, colon    - GASTROENTEROLOGY ADULT REF PROCEDURE ONLY Cristina Ramirez [phone]; No Provider Preference     BMI:   Estimated body mass index is 32.28 kg/m  as calculated from the following:    Height as of this encounter: 1.638 m (5' 4.5\").    Weight as of this encounter: 86.6 kg (191 lb).         Return in about 1 month (around 10/11/2019) for Annual Physical Exam.    Edgardo Mckeon MD  [facility]      "

## 2019-09-16 ENCOUNTER — HOSPITAL ENCOUNTER (OUTPATIENT)
Facility: CLINIC | Age: 54
End: 2019-09-16
Attending: SURGERY | Admitting: SURGERY
Payer: COMMERCIAL

## 2019-10-26 ENCOUNTER — HEALTH MAINTENANCE LETTER (OUTPATIENT)
Age: 54
End: 2019-10-26

## 2019-10-30 ENCOUNTER — TRANSFERRED RECORDS (OUTPATIENT)
Dept: HEALTH INFORMATION MANAGEMENT | Facility: CLINIC | Age: 54
End: 2019-10-30

## 2019-11-12 ENCOUNTER — TELEPHONE (OUTPATIENT)
Dept: CARDIOLOGY | Facility: CLINIC | Age: 54
End: 2019-11-12

## 2019-11-12 NOTE — TELEPHONE ENCOUNTER
Ly would like a call back as soon as possible about the patients medication and how long she has to hold before &  After procedure. Call [phone]

## 2020-01-09 ENCOUNTER — MYC REFILL (OUTPATIENT)
Dept: CARDIOLOGY | Facility: CLINIC | Age: 55
End: 2020-01-09

## 2020-01-09 DIAGNOSIS — Q24.5 ALCAPA (ANOMALOUS LEFT CORONARY ARTERY FROM THE PULMONARY ARTERY): ICD-10-CM

## 2020-01-09 DIAGNOSIS — E78.5 HYPERLIPIDEMIA LDL GOAL <100: ICD-10-CM

## 2020-01-09 RX ORDER — ATORVASTATIN CALCIUM 20 MG/1
20 TABLET, FILM COATED ORAL DAILY
Qty: 90 TABLET | Refills: 1 | Status: SHIPPED | OUTPATIENT
Start: 2020-01-09 | End: 2020-07-01

## 2020-01-09 NOTE — TELEPHONE ENCOUNTER
atorvastatin (LIPITOR) 20 MG tablet       Last Written Prescription Date:  1-17-19  Last Fill Quantity: 90,   # refills: 3  Last Office Visit : 4-9-19  Future Office visit:  none   90 :1          Patient Comment: There were no refills so not sure if I still need this or not       Last clinic note:   Med Reconcile: Reviewed and verified all current medications with the patient. The updated medication list was printed and given to the patient.  (Med listed on last AVS, no changes in med)

## 2020-03-30 DIAGNOSIS — Q24.5 ALCAPA (ANOMALOUS LEFT CORONARY ARTERY FROM THE PULMONARY ARTERY): ICD-10-CM

## 2020-03-31 RX ORDER — METOPROLOL SUCCINATE 25 MG/1
25 TABLET, EXTENDED RELEASE ORAL DAILY
Qty: 90 TABLET | Refills: 3 | Status: SHIPPED | OUTPATIENT
Start: 2020-03-31 | End: 2020-07-15

## 2020-04-02 DIAGNOSIS — Q24.5 ALCAPA (ANOMALOUS LEFT CORONARY ARTERY FROM THE PULMONARY ARTERY): ICD-10-CM

## 2020-04-06 DIAGNOSIS — Q24.5 ALCAPA (ANOMALOUS LEFT CORONARY ARTERY FROM THE PULMONARY ARTERY): ICD-10-CM

## 2020-05-19 ENCOUNTER — TELEPHONE (OUTPATIENT)
Dept: CARDIOLOGY | Facility: CLINIC | Age: 55
End: 2020-05-19

## 2020-05-19 NOTE — TELEPHONE ENCOUNTER
Patient left  stating that she has scheduled her knee replacement surgery for June 8th. She is wondering if there is anything special she needs to do with her meds prior to surgery and after surgery.

## 2020-05-22 NOTE — TELEPHONE ENCOUNTER
Date: 5/22/2020    Time of Call: 3:06 PM     Diagnosis:  Anomalous coronary artery     [ TORB ] Ordering provider: Dr. Wilson  Order: She does not need bridging for knee replacement and can hold the eliquis for the procedure.      Order received by: DEEPA Bowman     Follow-up/additional notes: Called patient and provided information from Dr. Wilson.

## 2020-05-26 ENCOUNTER — OFFICE VISIT (OUTPATIENT)
Dept: FAMILY MEDICINE | Facility: CLINIC | Age: 55
End: 2020-05-26
Payer: COMMERCIAL

## 2020-05-26 VITALS
TEMPERATURE: 98.5 F | WEIGHT: 183 LBS | BODY MASS INDEX: 30.49 KG/M2 | HEIGHT: 65 IN | SYSTOLIC BLOOD PRESSURE: 128 MMHG | OXYGEN SATURATION: 97 % | HEART RATE: 77 BPM | DIASTOLIC BLOOD PRESSURE: 82 MMHG

## 2020-05-26 DIAGNOSIS — Z79.01 ON CONTINUOUS ORAL ANTICOAGULATION: ICD-10-CM

## 2020-05-26 DIAGNOSIS — Q24.5 ANOMALOUS CORONARY ARTERY COMMUNICATION: ICD-10-CM

## 2020-05-26 DIAGNOSIS — Z01.818 PREOP GENERAL PHYSICAL EXAM: Primary | ICD-10-CM

## 2020-05-26 DIAGNOSIS — Q24.5 ALCAPA (ANOMALOUS LEFT CORONARY ARTERY FROM THE PULMONARY ARTERY): ICD-10-CM

## 2020-05-26 DIAGNOSIS — G89.29 CHRONIC PAIN OF RIGHT KNEE: ICD-10-CM

## 2020-05-26 DIAGNOSIS — M25.561 CHRONIC PAIN OF RIGHT KNEE: ICD-10-CM

## 2020-05-26 DIAGNOSIS — E05.90 HYPERTHYROIDISM: ICD-10-CM

## 2020-05-26 LAB
HGB BLD-MCNC: 14.6 G/DL (ref 11.7–15.7)
T3FREE SERPL-MCNC: 2.9 PG/ML (ref 2.3–4.2)

## 2020-05-26 PROCEDURE — 84443 ASSAY THYROID STIM HORMONE: CPT | Performed by: FAMILY MEDICINE

## 2020-05-26 PROCEDURE — 36415 COLL VENOUS BLD VENIPUNCTURE: CPT | Performed by: FAMILY MEDICINE

## 2020-05-26 PROCEDURE — 84439 ASSAY OF FREE THYROXINE: CPT | Performed by: FAMILY MEDICINE

## 2020-05-26 PROCEDURE — 80053 COMPREHEN METABOLIC PANEL: CPT | Performed by: FAMILY MEDICINE

## 2020-05-26 PROCEDURE — 85018 HEMOGLOBIN: CPT | Performed by: FAMILY MEDICINE

## 2020-05-26 PROCEDURE — 84481 FREE ASSAY (FT-3): CPT | Performed by: FAMILY MEDICINE

## 2020-05-26 PROCEDURE — 99215 OFFICE O/P EST HI 40 MIN: CPT | Performed by: FAMILY MEDICINE

## 2020-05-26 ASSESSMENT — MIFFLIN-ST. JEOR: SCORE: 1413.02

## 2020-05-26 NOTE — PROGRESS NOTES
[facility]  [address] [address]  [phone]  Dept: [phone]    PRE-OP EVALUATION:  Today's date: 2020  **PREOP FAXED** Shonda Enrique CMA     Jesenia Fofana (: 1965) presents for pre-operative evaluation assessment as requested by Dr. Berry.  She requires evaluation and anesthesia risk assessment prior to undergoing surgery/procedure for treatment of R knee.    Proposed Surgery/ Procedure: knee replacement   Date of Surgery/ Procedure: 2020  Time of Surgery/ Procedure: 4:30 am   Hospital/Surgical Facility: [facility]   Fax number for surgical facility: ? [phone]   Primary Physician: [facility]  Type of Anesthesia Anticipated: General    Patient has a Health Care Directive or Living Will:  NO    1. YES - Do you have a history of heart attack, stroke, stent, bypass or surgery on an artery in the head, neck, heart or legs?  2. NO - Do you ever have any pain or discomfort in your chest?  3. NO - Do you have a history of  Heart Failure?  4. NO - Are you troubled by shortness of breath when: walking on the level, up a slight hill or at night?  5. NO - Do you currently have a cold, bronchitis or other respiratory infection?  6. NO - Do you have a cough, shortness of breath or wheezing?  7. NO - Do you sometimes get pains in the calves of your legs when you walk?  8. YES - Do you or anyone in your family have previous history of blood clots? Mom had stroke, sister had blood clot  9. NO - Do you or does anyone in your family have a serious bleeding problem such as prolonged bleeding following surgeries or cuts?  10. NO - Have you ever had problems with anemia or been told to take iron pills?  11. NO - Have you had any abnormal blood loss such as black, tarry or bloody stools, or abnormal vaginal bleeding?  12. YES - Have you ever had a blood transfusion? Had one after open heart surgery at  age 30   13. YES - Have you or  any of your relatives ever had problems with anesthesia?  14. NO - Do you have sleep apnea, excessive snoring or daytime drowsiness?  15. NO - Do you have any prosthetic heart valves?  16. NO - Do you have prosthetic joints?  17. NO - Is there any chance that you may be pregnant?      HPI:     HPI related to upcoming procedure: has ongoing issue with knee pain, so now scheduled for surgery       See problem list for active medical problems.  Problems all longstanding and stable, except as noted/documented.  See ROS for pertinent symptoms related to these conditions.  Has hx of cardias issues, on anticoagulation and  being followed by cardiology . Doing well. She had seen cardiology this spring and also talked to them cardiology this week and they told her to just hold her blood thinner same day as surgery but otherwise she was told that she is ok and they were not concerned. She herself is feeling well otherwise   Has hx of thyrotoxicosis  In the past  so wants her thyroid checked. She feels well otherwise. 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     Priority: Medium     Added automatically from request for surgery [id number]       SOB (shortness of breath) 02/14/2019     Priority: Medium     Added automatically from request for surgery [id number]       Atypical chest pain 02/14/2019     Priority: Medium     Added automatically from request for surgery [id number]       Abnormal cardiovascular stress test 02/14/2019     Priority: Medium     Added automatically from request for surgery [id number]       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/29/2019     Priority: Medium     S/p repair 2000        Patient is Followed by the Adult [facility]ital [facility] Clinic 2019     Priority: Medium     Hyperlipidemia LDL goal <100 01/10/2012     Priority: Medium     Hyperthyroidism 2010     Priority: Medium      Past Medical History:   Diagnosis Date     Grave's disease 2010     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)     abnl left coronary artery defect     Past Surgical History:   Procedure Laterality Date     C NONSPECIFIC PROCEDURE      left coronary artery revision     CV RIGHT HEART CATH N/A 3/7/2019    Procedure: RHC with Coronay Angiogram;  Surgeon: Shikha Wilson MD;  Location:  HEART CARDIAC CATH LAB     Current Outpatient Medications   Medication Sig Dispense Refill     apixaban ANTICOAGULANT (ELIQUIS ANTICOAGULANT) 5 MG tablet Take 1 tablet (5 mg) by mouth 2 times daily 180 tablet 0     atorvastatin (LIPITOR) 20 MG tablet Take 1 tablet (20 mg) by mouth daily 90 tablet 1     clobetasol (TEMOVATE) 0.05 % external ointment Apply topically 2 times daily as needed (hand eczema) 45 g 1     metoprolol succinate ER (TOPROL-XL) 25 MG 24 hr tablet Take 1 tablet (25 mg) by mouth daily 90 tablet 3     naproxen sodium (ALEVE) 220 MG capsule Take 220 mg by mouth 2 times daily (with meals)       OTC products: None, except as noted above    Allergies   Allergen Reactions     Aspirin Hives      Latex Allergy: NO    Social History     Tobacco Use     Smoking status: Former Smoker     Packs/day: 1.50     Years: 10.00     Pack years: 15.00     Types: Cigarettes     Last attempt to quit: 1991     Years since quittin.9     Smokeless tobacco: Never Used   Substance Use Topics     Alcohol use: Yes     Alcohol/week: 0.0 standard drinks     Comment: socially     History   Drug Use No       REVIEW OF SYSTEMS:   CONSTITUTIONAL: NEGATIVE for fever, chills, change in weight  INTEGUMENTARY/SKIN: NEGATIVE for worrisome rashes, moles or lesions  EYES: NEGATIVE for vision changes or  irritation  ENT/MOUTH: NEGATIVE for ear, mouth and throat problems  RESP: NEGATIVE for significant cough or SOB  CV: NEGATIVE for chest pain, palpitations or peripheral edema  GI: NEGATIVE for nausea, abdominal pain, heartburn, or change in bowel habits  : NEGATIVE for frequency, dysuria, or hematuria  MUSCULOSKELETAL:NEGATIVE for significant arthralgias or myalgia and except as per HPI   NEURO: NEGATIVE for weakness, dizziness or paresthesias  ENDOCRINE: NEGATIVE for temperature intolerance, skin/hair changes  HEME: NEGATIVE for bleeding problems  PSYCHIATRIC: NEGATIVE for changes in mood or affect    EXAM:   LMP 04/25/2011   GENERAL APPEARANCE: healthy, alert and no distress  HENT: ear canals and TM's normal and nose and mouth without ulcers or lesions  NECK: no adenopathy, no asymmetry, masses, or scars and thyroid normal to palpation  RESP: lungs clear to auscultation - no rales, rhonchi or wheezes  CV: regular rate and rhythm, normal S1 S2, no S3 or S4   ABDOMEN: soft, nontender, no HSM or masses and bowel sounds normal  MS: no edema  SKIN: no suspicious lesions or rashes  NEURO: grossly  normal  mentation intact and speech normal  PSYCH: mentation appears normal and affect normal    DIAGNOSTICS:   EKG: Not done  due to had done check with cardiology within last one  last year .   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    REVISED CARDIAC RISK INDEX  The patient has the following serious cardiovascular risks for perioperative complications such as (MI, PE, VFib and 3  AV Block):  High risk surgery (>5% cardiac complication risk)  INTERPRETATION: 2 risks: Class III (moderate risk - 6.6% complication rate)    The patient has the following additional risks for perioperative complications:  No identified additional risks        RECOMMENDATIONS:     --Consult hospital rounder / IM to assist post-op medical management, only if needed     Cardiovascular Risk      --Patient is to take all scheduled medications on the day of surgery EXCEPT for modifications listed below.    Anticoagulant or Antiplatelet Medication Use    ELIQUIS:Bleeding risk is low for this procedure and apixaban (Eliquis) should be stopped at least 24 hours prior to procedure , as  per cardiology instruction     APPROVAL GIVEN to proceed with proposed procedure, without further diagnostic evaluation       Signed Electronically by: Brittany Hammonds MD    Copy of this evaluation report is provided to requesting physician.    [facility] Preop Guidelines    Revised Cardiac Risk Index

## 2020-05-27 LAB
ALBUMIN SERPL-MCNC: 4.3 G/DL (ref 3.4–5)
ALP SERPL-CCNC: 112 U/L (ref 40–150)
ALT SERPL W P-5'-P-CCNC: 42 U/L (ref 0–50)
ANION GAP SERPL CALCULATED.3IONS-SCNC: 5 MMOL/L (ref 3–14)
AST SERPL W P-5'-P-CCNC: 26 U/L (ref 0–45)
BILIRUB SERPL-MCNC: 0.4 MG/DL (ref 0.2–1.3)
BUN SERPL-MCNC: 10 MG/DL (ref 7–30)
CALCIUM SERPL-MCNC: 8.7 MG/DL (ref 8.5–10.1)
CHLORIDE SERPL-SCNC: 106 MMOL/L (ref 94–109)
CO2 SERPL-SCNC: 29 MMOL/L (ref 20–32)
CREAT SERPL-MCNC: 0.55 MG/DL (ref 0.52–1.04)
GFR SERPL CREATININE-BSD FRML MDRD: >90 ML/MIN/{1.73_M2}
GLUCOSE SERPL-MCNC: 79 MG/DL (ref 70–99)
POTASSIUM SERPL-SCNC: 4.2 MMOL/L (ref 3.4–5.3)
PROT SERPL-MCNC: 7.7 G/DL (ref 6.8–8.8)
SODIUM SERPL-SCNC: 140 MMOL/L (ref 133–144)
T4 FREE SERPL-MCNC: 1.53 NG/DL (ref 0.76–1.46)
TSH SERPL DL<=0.005 MIU/L-ACNC: 2.75 MU/L (ref 0.4–4)

## 2020-06-02 ENCOUNTER — TELEPHONE (OUTPATIENT)
Dept: FAMILY MEDICINE | Facility: CLINIC | Age: 55
End: 2020-06-02

## 2020-06-02 NOTE — TELEPHONE ENCOUNTER
Reason for Call:  Other Referral    Detailed comments: [facility] called in and stated pt needs a referral sent to her Ortho provider because she is going to have surgery. She did not have a fax, only telephone number [phone]. Please call pt with any questions    Phone Number Patient can be reached at: Home number on file [phone] (home)    Best Time:      Can we leave a detailed message on this number? YES    Call taken on 6/2/2020 at 2:25 PM by Liliana Lock

## 2020-06-04 NOTE — TELEPHONE ENCOUNTER
Referral has been facilitated to pt's insurance and pt has been informed.    Celestina-Referrals

## 2020-06-11 ENCOUNTER — TRANSFERRED RECORDS (OUTPATIENT)
Dept: HEALTH INFORMATION MANAGEMENT | Facility: CLINIC | Age: 55
End: 2020-06-11

## 2020-06-24 ENCOUNTER — MEDICAL CORRESPONDENCE (OUTPATIENT)
Dept: HEALTH INFORMATION MANAGEMENT | Facility: CLINIC | Age: 55
End: 2020-06-24

## 2020-06-24 ENCOUNTER — TRANSFERRED RECORDS (OUTPATIENT)
Dept: HEALTH INFORMATION MANAGEMENT | Facility: CLINIC | Age: 55
End: 2020-06-24

## 2020-07-01 DIAGNOSIS — E78.5 HYPERLIPIDEMIA LDL GOAL <100: Primary | ICD-10-CM

## 2020-07-01 DIAGNOSIS — Q24.5 ALCAPA (ANOMALOUS LEFT CORONARY ARTERY FROM THE PULMONARY ARTERY): ICD-10-CM

## 2020-07-01 RX ORDER — ATORVASTATIN CALCIUM 20 MG/1
20 TABLET, FILM COATED ORAL DAILY
Qty: 90 TABLET | Refills: 0 | Status: SHIPPED | OUTPATIENT
Start: 2020-07-01 | End: 2020-07-15

## 2020-07-01 NOTE — PROGRESS NOTES
90 day RF sent. Patient needs to be seen in clinic with echo and fasting labs prior. Sent to scheduling.

## 2020-07-15 DIAGNOSIS — Q24.5 ALCAPA (ANOMALOUS LEFT CORONARY ARTERY FROM THE PULMONARY ARTERY): ICD-10-CM

## 2020-07-15 DIAGNOSIS — E78.5 HYPERLIPIDEMIA LDL GOAL <100: ICD-10-CM

## 2020-07-15 RX ORDER — METOPROLOL SUCCINATE 25 MG/1
25 TABLET, EXTENDED RELEASE ORAL DAILY
Qty: 90 TABLET | Refills: 0 | Status: SHIPPED | OUTPATIENT
Start: 2020-07-15 | End: 2020-12-29

## 2020-07-15 RX ORDER — ATORVASTATIN CALCIUM 20 MG/1
20 TABLET, FILM COATED ORAL DAILY
Qty: 90 TABLET | Refills: 0 | Status: SHIPPED | OUTPATIENT
Start: 2020-07-15 | End: 2020-12-18

## 2020-07-22 ENCOUNTER — TRANSFERRED RECORDS (OUTPATIENT)
Dept: HEALTH INFORMATION MANAGEMENT | Facility: CLINIC | Age: 55
End: 2020-07-22

## 2020-09-09 NOTE — TELEPHONE ENCOUNTER
Wellness Screening Tool    Symptom Screening:    Do you have one of the following NEW symptoms:      Fever (subjective or >100.0)?  No    New cough? No    Shortness of breath? No    Chills? No    New loss of taste or smell? No    Generalized body aches? No    New persistent headache? No    New sore throat? No    Nausea, vomiting or diarrhea? No    Within the past 2 weeks, have you been exposed to someone with a known positive illness below?      COVID - 19 (known or suspected) No    Chicken pox?  No    Measles? No    Pertussis? No    Have you had a positive COVID-19 diagnostic test (nasal swab test) in the last 14 days or are you currently   on self-quarantine restrictions (i.e.travel restriction, exposure, etc?) No        Patient notified of visitor restriction: Yes  Patient informed to wear a mask: Yes    Patient's appointment status: Patient will be seen in clinic as scheduled on 9/10/2020

## 2020-09-10 ENCOUNTER — HOSPITAL ENCOUNTER (OUTPATIENT)
Dept: CARDIOLOGY | Facility: CLINIC | Age: 55
Discharge: HOME | End: 2020-09-10
Attending: INTERNAL MEDICINE | Admitting: INTERNAL MEDICINE
Payer: COMMERCIAL

## 2020-09-10 ENCOUNTER — OFFICE VISIT (OUTPATIENT)
Dept: CARDIOLOGY | Facility: CLINIC | Age: 55
End: 2020-09-10
Attending: INTERNAL MEDICINE
Payer: COMMERCIAL

## 2020-09-10 VITALS
DIASTOLIC BLOOD PRESSURE: 84 MMHG | SYSTOLIC BLOOD PRESSURE: 125 MMHG | HEART RATE: 72 BPM | WEIGHT: 186 LBS | HEIGHT: 65 IN | BODY MASS INDEX: 30.99 KG/M2

## 2020-09-10 DIAGNOSIS — E78.5 HYPERLIPIDEMIA LDL GOAL <100: ICD-10-CM

## 2020-09-10 DIAGNOSIS — Q24.5 ALCAPA (ANOMALOUS LEFT CORONARY ARTERY FROM THE PULMONARY ARTERY): ICD-10-CM

## 2020-09-10 LAB
CHOLEST SERPL-MCNC: 146 MG/DL
HDLC SERPL-MCNC: 49 MG/DL
LDLC SERPL CALC-MCNC: 72 MG/DL
NONHDLC SERPL-MCNC: 97 MG/DL
TRIGL SERPL-MCNC: 125 MG/DL

## 2020-09-10 PROCEDURE — 99213 OFFICE O/P EST LOW 20 MIN: CPT | Performed by: INTERNAL MEDICINE

## 2020-09-10 PROCEDURE — 25500064 ZZH RX 255 OP 636: Performed by: INTERNAL MEDICINE

## 2020-09-10 PROCEDURE — 36415 COLL VENOUS BLD VENIPUNCTURE: CPT | Performed by: INTERNAL MEDICINE

## 2020-09-10 PROCEDURE — 93306 TTE W/DOPPLER COMPLETE: CPT

## 2020-09-10 PROCEDURE — 93306 TTE W/DOPPLER COMPLETE: CPT | Mod: 26 | Performed by: INTERNAL MEDICINE

## 2020-09-10 PROCEDURE — 80061 LIPID PANEL: CPT | Performed by: INTERNAL MEDICINE

## 2020-09-10 RX ADMIN — HUMAN ALBUMIN MICROSPHERES AND PERFLUTREN 9 ML: 10; .22 INJECTION, SOLUTION INTRAVENOUS at 11:00

## 2020-09-10 ASSESSMENT — MIFFLIN-ST. JEOR: SCORE: 1421.63

## 2020-09-10 NOTE — NURSING NOTE
Cardiac Testing: Patient given instructions regarding  echocardiogram . Discussed purpose, preparation, procedure and when to expect results reported back to the patient. Patient demonstrated understanding of this information and agreed to call with further questions or concerns.    Diet: Patient instructed regarding a heart healthy diet, including discussion of reduced fat and sodium intake. Patient demonstrated understanding of this information and agreed to call with further questions or concerns.    Labs: Patient was given results of the laboratory testing obtained today. Patient demonstrated understanding of this information and agreed to call with further questions or concerns.   Med Reconcile: Reviewed and verified all current medications with the patient. The updated medication list was printed and given to the patient.    Return Appointment: Patient given instructions regarding scheduling next clinic visit. Patient demonstrated understanding of this information and agreed to call with further questions or concerns.    Patient stated she understood all health information given and agreed to call with further questions or concerns.    Gracie Wilson, MSN, RN, CNL  Cardiology Care Coordinator  [facility]  [phone]

## 2020-09-10 NOTE — PATIENT INSTRUCTIONS
You were seen today in the Adult Congenital and Cardiovascular Genetics Clinic at the [facility].    Cardiology Providers you saw during your visit:  ASIA Wilson MD    Diagnosis:  Anomalous coronary arteries s/p repair    Results:  ASIA Wilson MD reviewed the results of your labs and echo testing today in clinic.    Recommendations:    1. Continue to eat a heart healthy, low salt diet.  2. Continue to get 20-30 minutes of aerobic activity, 4-5 days per week.  Examples of aerobic activity include walking, running, swimming, cycling, etc.  3. Continue to observe good oral hygiene, with regular dental visits.  4. We have referred you to Sleep Medicine. Please call us next week if you haven't heard from them.    SBE prophylaxis:   Yes____  No_X___    Lifelong Bacterial Endocarditis Prophylaxis:  YES____  NO____    If YES is checked, follow the recommendations outlined below:  1. Take antibiotic(s) prior to recommended dental procedures and procedures on the respiratory tract or with infected skin, muscle or bones. SBE prophylaxis is not needed for routine GI and  procedures (ie. Colonoscopy or vaginal delivery)  2. Observe good oral hygiene daily, as advised by your dentist. Get regular professional dental care.  3. Keep cuts clean.  4. Infections should be treated promptly.  5. Symptoms of Infective Endocarditis could include: fever lasting more than 4-5 days or a recurrent fever that initially resolves but returns within 1-2 days)      Exercise restrictions:   Yes__X__  No____         If yes, list restrictions:  Must be allowed to rest if fatigued or SOB    Work restrictions:  Yes____  No_X___         If yes, list restrictions:    FASTING CHOLESTEROL was checked in the last 5 years YES__X_  NO___ (2020)  Continue to eat a heart healthy, low salt diet.         ____ Fasting lipid panel order today         ____ No changes in medications          ____ I recommend the following changes in your  cholesterol medications.:          ____ Please follow up for cholesterol screening at your primary care physician      Follow-up:  Follow up with Dr. Wilson in one year with an echo prior.    If you have questions or concerns please contact us at:    Gracie Wilson, MSN, RN, CNL    Jessica Parra (Scheduling)  Nurse Care Coordinator     Clinic   Adult Congenital and CV Genetics   Adult Congenital and CV Genetic  [facility]   [facility]  (P) [phone]     (P) [phone]  [email or web address]   (F) [phone]        For after hours urgent needs, call [phone] and ask to speak to the Adult Congenital Physician on call.  Mention Job Code 0401.    For emergencies call 911.    [facility]   Clinics and Surgery Center  Mail Code [id number]  [address] [id number]

## 2020-09-10 NOTE — PROGRESS NOTES
CARDIOLOGY CONSULTATION:    Ms. Fofana is a delightful, 55-year-old woman with a history of ALCAPA; please see my previous note from January 2019 for details.  I ended up cathing her given she had some chest pressure with exertion, and she had a positive nuclear stress test in January 2019.  The cath showed that she had a ligated left main, and then they plugged a LIMA into the mid LAD.  She has an aneurysm at the implantation site of the LIMA in her LAD, and she has collaterals from her massive right coronary artery.  Her coronary artery is between 9-11 mm, so quite enlarged, but there is nothing that would need intervention or a surgery could repair.      Since I last saw her she has been feeling well and is hoping to get her right knee replaced in December after getting her left knee done this summer. She feels dramatically better. 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  She has good blood pressure control.  Her heart rate is in the 80s.  She is currently not on any blood pressure medications.      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    abnl left coronary artery defect       CURRENT MEDICATIONS:  Current Outpatient Medications   Medication Sig Dispense Refill     apixaban ANTICOAGULANT (ELIQUIS ANTICOAGULANT) 5 MG tablet Take 1 tablet (5 mg) by mouth 2 times daily 180 tablet 0     atorvastatin (LIPITOR) 20 MG tablet Take 1 tablet (20 mg) by mouth daily 90 tablet 0     clobetasol (TEMOVATE) 0.05 % external ointment Apply topically 2 times daily as needed (hand eczema) 45 g 1     metoprolol succinate ER (TOPROL-XL) 25 MG 24 hr tablet Take 1 tablet (25 mg) by mouth daily 90 tablet 0     naproxen sodium (ALEVE) 220 MG capsule Take 220 mg by mouth 2 times daily (with meals)         PAST SURGICAL HISTORY:  Past Surgical History:   Procedure  Laterality Date     C NONSPECIFIC PROCEDURE      left coronary artery revision     CV RIGHT HEART CATH N/A 3/7/2019    Procedure: RHC with Coronay Angiogram;  Surgeon: Shikha Wilson MD;  Location:  HEART CARDIAC CATH LAB       ALLERGIES  Aspirin    FAMILY HX:  Family History   Problem Relation Age of Onset     Heart Disease Maternal Grandfather         MI     Cancer - colorectal Paternal Grandfather      Diabetes Maternal Uncle      Thyroid Disease Sister         hypothyroidism     Neurologic Disorder Sister         MS diagnosed at 40y.o.       SOCIAL HX:  Social History     Socioeconomic History     Marital status:      Spouse name: Adolfo     Number of children: 2     Years of education: 14 years     Highest education level: None   Occupational History     Occupation: management     Employer: [employer]     Comment: [employer] equipment   Social Needs     Financial resource strain: None     Food insecurity     Worry: None     Inability: None     Transportation needs     Medical: None     Non-medical: None   Tobacco Use     Smoking status: Former Smoker     Packs/day: 1.50     Years: 10.00     Pack years: 15.00     Types: Cigarettes     Last attempt to quit: 1991     Years since quittin.1     Smokeless tobacco: Never Used   Substance and Sexual Activity     Alcohol use: Yes     Alcohol/week: 0.0 standard drinks     Comment: socially     Drug use: No     Sexual activity: Yes     Partners: Male   Lifestyle     Physical activity     Days per week: None     Minutes per session: None     Stress: None   Relationships     Social connections     Talks on phone: None     Gets together: None     Attends Yazidi service: None     Active member of club or organization: None     Attends meetings of clubs or organizations: None     Relationship status: None     Intimate partner violence     Fear of current or ex partner: None     Emotionally abused: None     Physically abused: None  "    Forced sexual activity: None   Other Topics Concern      Service No     Blood Transfusions No     Caffeine Concern No     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 Not Asked   Social History Narrative     None       ROS:  Constitutional: No fever, chills, or sweats. No weight gain/loss.   ENT: No visual disturbance, ear ache, epistaxis, sore throat.   Allergies/Immunologic: Negative.   Respiratory: No cough, hemoptysis.   Cardiovascular: As per HPI.   GI: No nausea, vomiting, hematemesis, melena, or hematochezia.   : No urinary frequency, dysuria, or hematuria.   Integument: Negative.   Psychiatric: Negative.   Neuro: Negative.   Endocrinology: Negative.   Musculoskeletal: No myalgia.    VITAL SIGNS:  /84   Pulse 72   Ht 1.638 m (5' 4.5\")   Wt 84.4 kg (186 lb)   LMP 04/25/2011   BMI 31.43 kg/m    Body mass index is 31.43 kg/m .  Wt Readings from Last 2 Encounters:   09/10/20 84.4 kg (186 lb)   05/26/20 83 kg (183 lb)       PHYSICAL EXAM  Jesenia Fofana IS A 55 year old female.in no acute distress.  HEENT: Unremarkable.  Neck: JVP normal.  Carotids +4/4 bilaterally without bruits.  Lungs: CTA.  Cor: RRR. Normal S1 and S2.  No murmur, rub, or gallop.  PMI in Lf 5th ICS.  Abd: Soft, nontender, nondistended.  NABS.  No pulsatile mass.  Extremities: No C/C/E.  Pulses +4/4 symmetric in upper and lower extremities.  Neuro: Grossly intact.    LABS    Lab Results   Component Value Date    WBC 6.2 03/07/2019     Lab Results   Component Value Date    RBC 4.67 03/07/2019     Lab Results   Component Value Date    HGB 14.6 05/26/2020     Lab Results   Component Value Date    " HCT 40.5 03/07/2019     No components found for: MCT  Lab Results   Component Value Date    MCV 87 03/07/2019     Lab Results   Component Value Date    MCH 29.6 03/07/2019     Lab Results   Component Value Date    MCHC 34.1 03/07/2019     Lab Results   Component Value Date    RDW 12.1 03/07/2019     Lab Results   Component Value Date     03/07/2019      Recent Labs   Lab Test 05/26/20  1428 03/07/19  0658    144   POTASSIUM 4.2 4.2   CHLORIDE 106 111*   CO2 29 28   ANIONGAP 5 5   GLC 79 106*   BUN 10 8   CR 0.55 0.62   JOSEPHINE 8.7 8.4*     Recent Labs   Lab Test 09/10/20  0910 02/14/19  0940   CHOL 146 153   HDL 49* 48*   LDL 72 74   TRIG 125 155*   NHDL 97 105        IMPRESSION, REPORT AND PLAN:   1.  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 Norris with repair, including ligation of the left main and a LIMA to the LAD.   2.  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   3.  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   4.  History of thyrotoxicosis.      DISCUSSION:  It was a pleasure to see Ms. Fofana in followup.  Clinically, she is doing well.  I am so glad to hear how much better she feels with her knee replacement.  She is tolerating the Xarelto with the CAD aneurysms, and her severe apical wall motion abnormality.      From a cardiovascular standpoint, she is okay to have her knee surgery in December and does not need any further evaluation prior.  I would take beta blockers on the morning of the surgery.  She can hold the Eliquis for 5 days if needed.        We will plan to see her back in a year with an echo or sooner if there are any issues in the interim.  She understands and is in agreement with the plan as outlined.      It was a pleasure to see her.  Please do not hesitate to contact me with any questions or concerns.         MARCIAL CRAIG MD

## 2020-09-10 NOTE — LETTER
9/10/2020    [facility] MD [place]      RE: Jesenia LANA Fofana       Dear Colleague,    I had the [facility]    CARDIOLOGY CONSULTATION:    Ms. Fofana is a delightful, 55-year-old woman with a history of ALCAPA; please see my previous note from January 2019 for details.  I ended up cathing her given she had some chest pressure with exertion, and she had a positive nuclear stress test in January 2019.  The cath showed that she had a ligated left main, and then they plugged a LIMA into the mid LAD.  She has an aneurysm at the implantation site of the LIMA in her LAD, and she has collaterals from her massive right coronary artery.  Her coronary artery is between 9-11 mm, so quite enlarged, but there is nothing that would need intervention or a surgery could repair.      Since I last saw her she has been feeling well and is hoping to get her right knee replaced in December after getting her left knee done this summer. She feels dramatically better. She is working from home with Covid restrictions and continues to be a  for [employer].  She has good blood pressure control.  Her heart rate is in the 80s.  She is currently not on any blood pressure medications.      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    abnl left coronary artery defect       CURRENT MEDICATIONS:  Current Outpatient Medications   Medication Sig Dispense Refill     apixaban ANTICOAGULANT (ELIQUIS ANTICOAGULANT) 5 MG tablet Take 1 tablet (5 mg) by mouth 2 times daily 180 tablet 0     atorvastatin (LIPITOR) 20 MG tablet Take 1 tablet (20 mg) by mouth daily 90 tablet 0     clobetasol (TEMOVATE) 0.05 % external ointment Apply topically 2 times daily as needed (hand eczema) 45 g 1     metoprolol succinate ER (TOPROL-XL) 25 MG 24 hr tablet Take 1 tablet (25 mg) by  mouth daily 90 tablet 0     naproxen sodium (ALEVE) 220 MG capsule Take 220 mg by mouth 2 times daily (with meals)         PAST SURGICAL HISTORY:  Past Surgical History:   Procedure Laterality Date     C NONSPECIFIC PROCEDURE      left coronary artery revision     CV RIGHT HEART CATH N/A 3/7/2019    Procedure: RHC with Coronay Angiogram;  Surgeon: Shikha Wilson MD;  Location:  HEART CARDIAC CATH LAB       ALLERGIES  Aspirin    FAMILY HX:  Family History   Problem Relation Age of Onset     Heart Disease Maternal Grandfather         MI     Cancer - colorectal Paternal Grandfather      Diabetes Maternal Uncle      Thyroid Disease Sister         hypothyroidism     Neurologic Disorder Sister         MS diagnosed at 40y.o.       SOCIAL HX:  Social History     Socioeconomic History     Marital status:      Spouse name: Adolfo     Number of children: 2     Years of education: 14 years     Highest education level: None   Occupational History     Occupation: management     Employer: [employer]     Comment: [employer]   Social Needs     Financial resource strain: None     Food insecurity     Worry: None     Inability: None     Transportation needs     Medical: None     Non-medical: None   Tobacco Use     Smoking status: Former Smoker     Packs/day: 1.50     Years: 10.00     Pack years: 15.00     Types: Cigarettes     Last attempt to quit: 1991     Years since quittin.1     Smokeless tobacco: Never Used   Substance and Sexual Activity     Alcohol use: Yes     Alcohol/week: 0.0 standard drinks     Comment: socially     Drug use: No     Sexual activity: Yes     Partners: Male   Lifestyle     Physical activity     Days per week: None     Minutes per session: None     Stress: None   Relationships     Social connections     Talks on phone: None     Gets together: None     Attends Congregational service: None     Active member of club or organization: None     Attends meetings of clubs  "or organizations: None     Relationship status: None     Intimate partner violence     Fear of current or ex partner: None     Emotionally abused: None     Physically abused: None     Forced sexual activity: None   Other Topics Concern      Service No     Blood Transfusions No     Caffeine Concern No     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 Not Asked   Social History Narrative     None       ROS:  Constitutional: No fever, chills, or sweats. No weight gain/loss.   ENT: No visual disturbance, ear ache, epistaxis, sore throat.   Allergies/Immunologic: Negative.   Respiratory: No cough, hemoptysis.   Cardiovascular: As per HPI.   GI: No nausea, vomiting, hematemesis, melena, or hematochezia.   : No urinary frequency, dysuria, or hematuria.   Integument: Negative.   Psychiatric: Negative.   Neuro: Negative.   Endocrinology: Negative.   Musculoskeletal: No myalgia.    VITAL SIGNS:  /84   Pulse 72   Ht 1.638 m (5' 4.5\")   Wt 84.4 kg (186 lb)   LMP 04/25/2011   BMI 31.43 kg/m    Body mass index is 31.43 kg/m .  Wt Readings from Last 2 Encounters:   09/10/20 84.4 kg (186 lb)   05/26/20 83 kg (183 lb)       PHYSICAL EXAM  Jesenia Fofana IS A 55 year old female.in no acute distress.  HEENT: Unremarkable.  Neck: JVP normal.  Carotids +4/4 bilaterally without bruits.  Lungs: CTA.  Cor: RRR. Normal S1 and S2.  No murmur, rub, or gallop.  PMI in Lf 5th ICS.  Abd: Soft, nontender, nondistended.  NABS.  No pulsatile mass.  Extremities: No C/C/E.  Pulses +4/4 symmetric in upper and lower extremities.  Neuro: Grossly intact.    LABS    Lab Results   Component Value Date    WBC " 6.2 03/07/2019     Lab Results   Component Value Date    RBC 4.67 03/07/2019     Lab Results   Component Value Date    HGB 14.6 05/26/2020     Lab Results   Component Value Date    HCT 40.5 03/07/2019     No components found for: MCT  Lab Results   Component Value Date    MCV 87 03/07/2019     Lab Results   Component Value Date    MCH 29.6 03/07/2019     Lab Results   Component Value Date    MCHC 34.1 03/07/2019     Lab Results   Component Value Date    RDW 12.1 03/07/2019     Lab Results   Component Value Date     03/07/2019      Recent Labs   Lab Test 05/26/20  1428 03/07/19  0658    144   POTASSIUM 4.2 4.2   CHLORIDE 106 111*   CO2 29 28   ANIONGAP 5 5   GLC 79 106*   BUN 10 8   CR 0.55 0.62   JOSEPHINE 8.7 8.4*     Recent Labs   Lab Test 09/10/20  0910 02/14/19  0940   CHOL 146 153   HDL 49* 48*   LDL 72 74   TRIG 125 155*   NHDL 97 105        IMPRESSION, REPORT AND PLAN:   1.  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 Norris with repair, including ligation of the left main and a LIMA to the LAD.   2.  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   3.  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   4.  History of thyrotoxicosis.      DISCUSSION:  It was a pleasure to see Ms. Fofana in followup.  Clinically, she is doing well.  I am so glad to hear how much better she feels with her knee replacement.  She is tolerating the Xarelto with the CAD aneurysms, and her severe apical wall motion abnormality.      From a cardiovascular standpoint, she is okay to have her knee surgery in December and does not need any further evaluation prior.  I would take beta blockers on the morning of the surgery.  She can hold the Eliquis for 5 days if needed.        We will plan to see her back in a year with an echo or sooner if there are any issues in the interim.  She understands and is in  agreement with the plan as outlined.      It was a pleasure to see her.  Please do not hesitate to contact me with any questions or concerns.     Sincerely,     Shikha Wilson MD     [facility]

## 2020-10-08 ENCOUNTER — MYC REFILL (OUTPATIENT)
Dept: FAMILY MEDICINE | Facility: CLINIC | Age: 55
End: 2020-10-08

## 2020-10-08 DIAGNOSIS — L30.9 ECZEMA, UNSPECIFIED TYPE: ICD-10-CM

## 2020-10-08 DIAGNOSIS — Q24.5 ALCAPA (ANOMALOUS LEFT CORONARY ARTERY FROM THE PULMONARY ARTERY): ICD-10-CM

## 2020-10-13 RX ORDER — CLOBETASOL PROPIONATE 0.5 MG/G
OINTMENT TOPICAL 2 TIMES DAILY PRN
Qty: 45 G | Refills: 0 | Status: SHIPPED | OUTPATIENT
Start: 2020-10-13 | End: 2022-03-08

## 2020-10-13 NOTE — TELEPHONE ENCOUNTER
Medication refill ordered.  Patient is overdue for preventative visit, labs and Pap smear.  Please have her schedule an appointment as soon as possible    Edgardo Mckeon MD  [facility]

## 2020-10-14 NOTE — TELEPHONE ENCOUNTER
patient scheduled for 11/6 with Dr. Mckeon. Shonda Enrique, [facility]

## 2020-11-06 ENCOUNTER — OFFICE VISIT (OUTPATIENT)
Dept: FAMILY MEDICINE | Facility: CLINIC | Age: 55
End: 2020-11-06
Payer: COMMERCIAL

## 2020-11-06 VITALS
HEART RATE: 85 BPM | BODY MASS INDEX: 31.99 KG/M2 | HEIGHT: 65 IN | DIASTOLIC BLOOD PRESSURE: 82 MMHG | WEIGHT: 192 LBS | TEMPERATURE: 98.6 F | OXYGEN SATURATION: 96 % | SYSTOLIC BLOOD PRESSURE: 120 MMHG

## 2020-11-06 DIAGNOSIS — Z11.59 NEED FOR HEPATITIS C SCREENING TEST: ICD-10-CM

## 2020-11-06 DIAGNOSIS — Z12.4 SCREENING FOR MALIGNANT NEOPLASM OF CERVIX: ICD-10-CM

## 2020-11-06 DIAGNOSIS — Z12.11 SCREEN FOR COLON CANCER: ICD-10-CM

## 2020-11-06 DIAGNOSIS — Z00.00 ROUTINE GENERAL MEDICAL EXAMINATION AT A HEALTH CARE FACILITY: Primary | ICD-10-CM

## 2020-11-06 DIAGNOSIS — Z12.31 ENCOUNTER FOR SCREENING MAMMOGRAM FOR MALIGNANT NEOPLASM OF BREAST: ICD-10-CM

## 2020-11-06 DIAGNOSIS — Z11.4 SCREENING FOR HIV (HUMAN IMMUNODEFICIENCY VIRUS): ICD-10-CM

## 2020-11-06 DIAGNOSIS — Z23 FLU VACCINE NEED: ICD-10-CM

## 2020-11-06 LAB
GLUCOSE SERPL-MCNC: 113 MG/DL (ref 70–99)
HCV AB SERPL QL IA: NONREACTIVE
HIV 1+2 AB+HIV1 P24 AG SERPL QL IA: NONREACTIVE
TSH SERPL DL<=0.005 MIU/L-ACNC: 2.54 MU/L (ref 0.4–4)

## 2020-11-06 PROCEDURE — 82947 ASSAY GLUCOSE BLOOD QUANT: CPT | Performed by: FAMILY MEDICINE

## 2020-11-06 PROCEDURE — 90714 TD VACC NO PRESV 7 YRS+ IM: CPT | Performed by: FAMILY MEDICINE

## 2020-11-06 PROCEDURE — 84443 ASSAY THYROID STIM HORMONE: CPT | Performed by: FAMILY MEDICINE

## 2020-11-06 PROCEDURE — 86803 HEPATITIS C AB TEST: CPT | Performed by: FAMILY MEDICINE

## 2020-11-06 PROCEDURE — 90472 IMMUNIZATION ADMIN EACH ADD: CPT | Performed by: FAMILY MEDICINE

## 2020-11-06 PROCEDURE — 87624 HPV HI-RISK TYP POOLED RSLT: CPT | Performed by: FAMILY MEDICINE

## 2020-11-06 PROCEDURE — 36415 COLL VENOUS BLD VENIPUNCTURE: CPT | Performed by: FAMILY MEDICINE

## 2020-11-06 PROCEDURE — G0145 SCR C/V CYTO,THINLAYER,RESCR: HCPCS | Performed by: FAMILY MEDICINE

## 2020-11-06 PROCEDURE — 90682 RIV4 VACC RECOMBINANT DNA IM: CPT | Performed by: FAMILY MEDICINE

## 2020-11-06 PROCEDURE — 87389 HIV-1 AG W/HIV-1&-2 AB AG IA: CPT | Performed by: FAMILY MEDICINE

## 2020-11-06 PROCEDURE — 90471 IMMUNIZATION ADMIN: CPT | Performed by: FAMILY MEDICINE

## 2020-11-06 PROCEDURE — 99396 PREV VISIT EST AGE 40-64: CPT | Mod: 25 | Performed by: FAMILY MEDICINE

## 2020-11-06 PROCEDURE — G0124 SCREEN C/V THIN LAYER BY MD: HCPCS | Performed by: PATHOLOGY

## 2020-11-06 ASSESSMENT — MIFFLIN-ST. JEOR: SCORE: 1448.85

## 2020-11-06 NOTE — PROGRESS NOTES
SUBJECTIVE:   CC: Jesenia Fofana is an 55 year old woman who presents for preventive health visit.     Patient has been advised of split billing requirements and indicates understanding: Yes  Healthy Habits:    Do you get at least three servings of calcium containing foods daily (dairy, green leafy vegetables, etc.)? yes    Amount of exercise or daily activities, outside of work: 0 day(s) per week    Problems taking medications regularly No    Medication side effects: No    Have you had an eye exam in the past two years? yes    Do you see a dentist twice per year? no    Do you have sleep apnea, excessive snoring or daytime drowsiness?yes  Snoring           Today's PHQ-2 Score:   PHQ-2 (  [employer]) 2020   Q1: Little interest or pleasure in doing things 0 0   Q2: Feeling down, depressed or hopeless 0 0   PHQ-2 Score 0 0       Abuse: Current or Past(Physical, Sexual or Emotional)- No  Do you feel safe in your environment? Yes        Social History     Tobacco Use     Smoking status: Former Smoker     Packs/day: 1.50     Years: 10.00     Pack years: 15.00     Types: Cigarettes     Quit date: 1991     Years since quittin.3     Smokeless tobacco: Never Used   Substance Use Topics     Alcohol use: Yes     Alcohol/week: 0.0 standard drinks     Comment: socially     If you drink alcohol do you typically have >3 drinks per day or >7 drinks per week? No                     Reviewed orders with patient.  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)     SOB (shortness of breath)     Atypical chest pain     Abnormal cardiovascular stress test     Status post coronary angiogram     On  continuous oral anticoagulation     ASCUS with positive high risk HPV cervical     Past Surgical History:   Procedure Laterality Date     CV RIGHT HEART CATH N/A 3/7/2019    Procedure: RHC with Coronay Angiogram;  Surgeon: Shikha Wilson MD;  Location:  HEART CARDIAC CATH LAB     [facility] NONSPECIFIC PROCEDURE      left coronary artery revision       Social History     Tobacco Use     Smoking status: Former Smoker     Packs/day: 1.50     Years: 10.00     Pack years: 15.00     Types: Cigarettes     Quit date: 1991     Years since quittin.3     Smokeless tobacco: Never Used   Substance Use Topics     Alcohol use: Yes     Alcohol/week: 0.0 standard drinks     Comment: socially     Family History   Problem Relation Age of Onset     Heart Disease Maternal Grandfather         MI     Cancer - colorectal Paternal Grandfather      Diabetes Maternal Uncle      Thyroid Disease Sister         hypothyroidism     Neurologic Disorder Sister         MS diagnosed at 40y.o.         Current Outpatient Medications   Medication Sig Dispense Refill     apixaban ANTICOAGULANT (ELIQUIS ANTICOAGULANT) 5 MG tablet Take 1 tablet (5 mg) by mouth 2 times daily 180 tablet 3     atorvastatin (LIPITOR) 20 MG tablet Take 1 tablet (20 mg) by mouth daily 90 tablet 0     clobetasol (TEMOVATE) 0.05 % external ointment Apply topically 2 times daily as needed (hand eczema) 45 g 0     metoprolol succinate ER (TOPROL-XL) 25 MG 24 hr tablet Take 1 tablet (25 mg) by mouth daily 90 tablet 0     naproxen sodium (ALEVE) 220 MG capsule Take 220 mg by mouth 2 times daily (with meals)       Allergies   Allergen Reactions     Aspirin Hives       Mammogram Screening: Patient over age 50, mutual decision to screen reflected in health maintenance.    Pertinent mammograms are reviewed under the imaging tab.  History of abnormal Pap smear: NO - age 30-65 PAP every 5 years with negative HPV co-testing recommended  PAP / HPV Latest Ref Rng & Units  "11/6/2020 3/11/2011 3/4/2010   PAP - ASC-US(A) LSIL(A) ASC-US(A)   HPV 16 DNA NEG:Negative Negative - -   HPV 18 DNA NEG:Negative Negative - -   OTHER HR HPV NEG:Negative Positive(A) - -     Reviewed and updated as needed this visit by clinical staff  Tobacco  Allergies  Meds              Reviewed and updated as needed this visit by Provider   Allergies                  ROS:  CONSTITUTIONAL: NEGATIVE for fever, chills, change in weight  INTEGUMENTARY/SKIN: NEGATIVE for worrisome rashes, moles or lesions  EYES: NEGATIVE for vision changes or irritation  ENT: NEGATIVE for ear, mouth and throat problems  RESP: NEGATIVE for significant cough or SOB  BREAST: NEGATIVE for masses, tenderness or discharge  CV: NEGATIVE for chest pain, palpitations or peripheral edema  GI: NEGATIVE for nausea, abdominal pain, heartburn, or change in bowel habits  : NEGATIVE for unusual urinary or vaginal symptoms. No vaginal bleeding.  MUSCULOSKELETAL: NEGATIVE for significant arthralgias or myalgia  NEURO: NEGATIVE for weakness, dizziness or paresthesias  PSYCHIATRIC: NEGATIVE for changes in mood or affect     OBJECTIVE:   /82   Pulse 85   Temp 98.6  F (37  C) (Tympanic)   Ht 1.638 m (5' 4.5\")   Wt 87.1 kg (192 lb)   LMP 04/25/2011   SpO2 96%   BMI 32.45 kg/m    EXAM:  GENERAL APPEARANCE: healthy, alert and no distress  EYES: Eyes grossly normal to inspection, PERRL and conjunctivae and sclerae normal  HENT: ear canals and TM's normal, nose and mouth without ulcers or lesions, oropharynx clear and oral mucous membranes moist  NECK: no adenopathy, no asymmetry, masses, or scars and thyroid normal to palpation  RESP: lungs clear to auscultation - no rales, rhonchi or wheezes  BREAST: normal without masses, tenderness or nipple discharge and no palpable axillary masses or adenopathy  CV: regular rate and rhythm, normal S1 S2, no S3 or S4, no murmur, click or rub, no peripheral edema and peripheral pulses strong  ABDOMEN: " "soft, nontender, no hepatosplenomegaly, no masses and bowel sounds normal   (female): normal female external genitalia, normal urethral meatus, vaginal mucosal atrophy noted, normal cervix, adnexae, and uterus without masses or abnormal discharge  MS: no musculoskeletal defects are noted and gait is age appropriate without ataxia  SKIN: no suspicious lesions or rashes  NEURO: Normal strength and tone, sensory exam grossly normal, mentation intact and speech normal  PSYCH: mentation appears normal and affect normal/bright        ASSESSMENT/PLAN:   1. Routine general medical examination at a health care facility  Screening labs ordered.  Immunization updated.  - TD (ADULT, 7+) PRESERVE FREE  - TSH with free T4 reflex  - Glucose    2. Screen for colon cancer    - Fecal colorectal cancer screen (FIT); Future    3. Screening for HIV (human immunodeficiency virus)    - HIV Antigen Antibody Combo    4. Need for hepatitis C screening test    - Hepatitis C Screen Reflex to HCV RNA Quant and Genotype    5. Encounter for screening mammogram for malignant neoplasm of breast    - MA SCREENING DIGITAL BILAT - Future  (s+30); Future    6. Screening for malignant neoplasm of cervix    - Pap imaged thin layer screen with HPV - recommended age 30 - 65 years (select HPV order below)  - HPV High Risk Types DNA Cervical    7. Flu vaccine need    - AL RIV4 (FLUBLOK) VACCINE RECOMBINANT DNA PRSRV ANTIBIO FREE, IM ([id number])    Patient has been advised of split billing requirements and indicates understanding:   COUNSELING:   Reviewed preventive health counseling, as reflected in patient instructions       Regular exercise       Healthy diet/nutrition    Estimated body mass index is 32.45 kg/m  as calculated from the following:    Height as of this encounter: 1.638 m (5' 4.5\").    Weight as of this encounter: 87.1 kg (192 lb).    Weight management plan: Discussed healthy diet and exercise guidelines  She reports that she quit smoking " about 29 years ago. Her smoking use included cigarettes. She has a 15.00 pack-year smoking history. She has never used smokeless tobacco.      Counseling Resources:  ATP IV Guidelines  Pooled Cohorts Equation Calculator  Breast Cancer Risk Calculator  BRCA-Related Cancer Risk Assessment: FHS-7 Tool  FRAX Risk Assessment  ICSI Preventive Guidelines  Dietary Guidelines for Americans, 2010  Tynker's MyPlate  ASA Prophylaxis  Lung CA Screening    Edgardo Mckeon MD  [facility]

## 2020-11-11 LAB
COPATH REPORT: ABNORMAL
PAP: ABNORMAL

## 2020-11-12 ENCOUNTER — PATIENT OUTREACH (OUTPATIENT)
Dept: FAMILY MEDICINE | Facility: CLINIC | Age: 55
End: 2020-11-12

## 2020-11-12 LAB
FINAL DIAGNOSIS: ABNORMAL
HPV HR 12 DNA CVX QL NAA+PROBE: POSITIVE
HPV16 DNA SPEC QL NAA+PROBE: NEGATIVE
HPV18 DNA SPEC QL NAA+PROBE: NEGATIVE
SPECIMEN DESCRIPTION: ABNORMAL
SPECIMEN SOURCE CVX/VAG CYTO: ABNORMAL

## 2020-11-12 NOTE — TELEPHONE ENCOUNTER
3/11/11 LSIL, plan [place]. No record of [place] completed  11/6/20 ASCUS, +HR HPV, not 16/18. Plan [place] bef 2/6/21

## 2020-11-18 ENCOUNTER — OFFICE VISIT (OUTPATIENT)
Dept: FAMILY MEDICINE | Facility: CLINIC | Age: 55
End: 2020-11-18
Payer: COMMERCIAL

## 2020-11-18 VITALS
TEMPERATURE: 98.3 F | DIASTOLIC BLOOD PRESSURE: 76 MMHG | SYSTOLIC BLOOD PRESSURE: 102 MMHG | OXYGEN SATURATION: 96 % | BODY MASS INDEX: 32.79 KG/M2 | HEART RATE: 78 BPM | WEIGHT: 194 LBS

## 2020-11-18 DIAGNOSIS — M17.12 PRIMARY OSTEOARTHRITIS OF LEFT KNEE: ICD-10-CM

## 2020-11-18 DIAGNOSIS — Z79.01 ON CONTINUOUS ORAL ANTICOAGULATION: ICD-10-CM

## 2020-11-18 DIAGNOSIS — Q24.5 ALCAPA (ANOMALOUS LEFT CORONARY ARTERY FROM THE PULMONARY ARTERY): ICD-10-CM

## 2020-11-18 DIAGNOSIS — Z01.818 PREOPERATIVE EXAMINATION: Primary | ICD-10-CM

## 2020-11-18 DIAGNOSIS — R73.03 PRE-DIABETES: ICD-10-CM

## 2020-11-18 LAB
HBA1C MFR BLD: 5.9 % (ref 0–5.6)
HGB BLD-MCNC: 14.8 G/DL (ref 11.7–15.7)

## 2020-11-18 PROCEDURE — 36415 COLL VENOUS BLD VENIPUNCTURE: CPT | Performed by: PHYSICIAN ASSISTANT

## 2020-11-18 PROCEDURE — 83036 HEMOGLOBIN GLYCOSYLATED A1C: CPT | Performed by: PHYSICIAN ASSISTANT

## 2020-11-18 PROCEDURE — 93000 ELECTROCARDIOGRAM COMPLETE: CPT | Performed by: PHYSICIAN ASSISTANT

## 2020-11-18 PROCEDURE — 99215 OFFICE O/P EST HI 40 MIN: CPT | Performed by: PHYSICIAN ASSISTANT

## 2020-11-18 PROCEDURE — 85018 HEMOGLOBIN: CPT | Performed by: PHYSICIAN ASSISTANT

## 2020-11-18 NOTE — RESULT ENCOUNTER NOTE
Sher Ba,    I just wanted to let you know that your lab results have been reviewed and are attached.    - Hemoglobin A1c is a test shows your blood sugar level over the last 2-3 months. A normal result for someone who does not have diabetes is 4-5.7% (fasting blood sugar <100). An a1c 6.5% or higher is needed to diagnose diabetes. An a1c between 5.7-6.4% is considered impaired fasting glucose (pre-diabetes), and puts you at risk for developing diabetes.  To help decrease your risk of developing diabetes, work on the followin) Healthy eating. Focus on fresh fruits and vegetables, lean meats, whole grains, and low-fat dairy. Limit sugars and fats. Limit processed, prepackaged foods and fast foods. Avoid sugary drinks, such as soda, sports drinks, lemonade, and sweet tea. These foods are high in calories, fat, and sodium, and low in nutrition.   2) Physical activity. Being active helps your body use glucose. Try for at least 30 minutes of aerobic activity, 5 times per week.   3) Weight loss. Even a loss of 5% to 10% of body weight may help your body use glucose better.  - Your hemoglobin is normal (you do not have anemia).    Please let me know if you have any questions.    Sincerely,    Kelly Perdomo PA-C    [facility] [address] [address]  Ph: [phone]

## 2020-11-18 NOTE — PROGRESS NOTES
[address] [address]  Phone: [phone]  Primary Provider: Sharon Pemberton  Pre-op Performing Provider: VIRGINIA FINNEY    PREOPERATIVE EVALUATION:  Today's date: 11/18/2020    Jesenia Fofana is a 55 year old female who presents for a preoperative evaluation.    Surgical Information:  Surgery/Procedure: left total knee arthroplasty  Surgery Location: [facility]  Surgeon: Jamal  Surgery Date: 12/7/2020  Time of Surgery: AM  Where patient plans to recover: At home with family  Fax number for surgical facility: [phone]    Type of Anesthesia Anticipated: Spinal    Subjective     HPI related to upcoming procedure: patient with Lt knee OA, failed conservative management, undergoing Lt TKA on 12/7/2020, presents today for pre-op exam.    Preop Questions 11/17/2020   1. Have you ever had a heart attack or stroke? No   2. Have you ever had surgery on your heart or blood vessels, such as a stent placement, a coronary artery bypass, or surgery on an artery in your head, neck, heart, or legs? 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 Do you have chest pain with activity? No   4. Do you have a history of  heart failure? No   5. Do you currently have a cold, bronchitis or symptoms of other infection? No   6. Do you have a cough, shortness of breath, or wheezing? No   7. Do you or anyone in your family have previous history of blood clots? No   8. Do you or does anyone in your family have a serious bleeding problem such as prolonged bleeding following surgeries or cuts? No   9. Have you ever had problems with anemia or been told to take iron pills? No   10. Have you had any abnormal blood loss such as black, tarry or bloody stools, or abnormal vaginal bleeding? No   11. Have you ever had a blood transfusion? UNKNOWN   12. Are  you willing to have a blood transfusion if it is medically needed before, during, or after your surgery? Yes   13. Have you or any of your relatives ever had problems with anesthesia? No   14. Do you have sleep apnea, excessive snoring or daytime drowsiness? No   15. Do you have any artifical heart valves or other implanted medical devices like a pacemaker, defibrillator, or continuous glucose monitor? No   16. Do you have artificial joints? YES - s/p Rt TKA   17. Are you allergic to latex? No   18. Is there any chance that you may be pregnant? No       Health Care Directive:  Patient does not have a Health Care Directive or Living Will: Discussed advance care planning with patient; however, patient declined at this time.    Preoperative Review of :   reviewed - no concerns      Status of Chronic Conditions:  See problem list for active medical problems.  Problems all longstanding and stable, except as noted/documented.  See ROS for pertinent symptoms related to these conditions.      Review of Systems  Constitutional, neuro, ENT, endocrine, pulmonary, cardiac, gastrointestinal, genitourinary, musculoskeletal, integument and psychiatric systems are negative, except as otherwise noted.    Patient Active Problem List    Diagnosis Date Noted     Health Care Home 02/03/2012     Priority: High     x  DX V65.8 REPLACED WITH 26622 HEALTH CARE HOME (04/08/2013)       ASCUS with positive high risk HPV cervical      Priority: Medium     3/11/11 LSIL, plan [place]. No record of [place] completed  11/6/20 ASCUS, +HR HPV, not 16/18. 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     Priority: Medium     Added automatically from request for surgery [id number]       SOB (shortness of breath) 02/14/2019     Priority:  Medium     Added automatically from request for surgery [id number]       Atypical chest pain 02/14/2019     Priority: Medium     Added automatically from request for surgery [id number]       Abnormal cardiovascular stress test 02/14/2019     Priority: Medium     Added automatically from request for surgery [id number]       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/29/2019     Priority: Medium     S/p repair 2000       Patient is [facility] 01/29/2019     Priority: Medium     Hyperlipidemia LDL goal <100 01/10/2012     Priority: Medium     Hyperthyroidism 04/27/2010     Priority: Medium      Past Medical History:   Diagnosis Date     Abnormal Pap smear of cervix     2011, 2020     Grave's disease 04/01/2010     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) 01/01/1997    abnl left coronary artery defect     Past Surgical History:   Procedure Laterality Date     CV RIGHT HEART CATH N/A 3/7/2019    Procedure: RHC with Coronay Angiogram;  Surgeon: Shikha Wilson MD;  Location:  HEART CARDIAC CATH LAB     [facility] NONSPECIFIC PROCEDURE  1997    left coronary artery revision     Current Outpatient Medications   Medication Sig Dispense Refill     apixaban ANTICOAGULANT (ELIQUIS ANTICOAGULANT) 5 MG tablet Take 1 tablet (5 mg) by mouth 2 times daily 180 tablet 3     atorvastatin (LIPITOR) 20 MG tablet Take 1 tablet (20 mg) by mouth daily 90 tablet 0     clobetasol (TEMOVATE) 0.05 % external ointment Apply topically 2 times daily as needed (hand eczema) 45 g 0     metoprolol succinate ER (TOPROL-XL) 25 MG 24 hr tablet Take 1 tablet (25 mg) by mouth daily 90 tablet 0     naproxen sodium (ALEVE) 220 MG capsule Take 220 mg by mouth 2 times daily (with meals)         Allergies   Allergen Reactions     Aspirin Hives        Social History     Tobacco Use     Smoking status: Former Smoker     Packs/day: 1.50     Years: 10.00     Pack years: 15.00     Types: Cigarettes     Quit date:  1991     Years since quittin.3     Smokeless tobacco: Never Used   Substance Use Topics     Alcohol use: Yes     Alcohol/week: 0.0 standard drinks     Comment: socially     Family History   Problem Relation Age of Onset     Heart Disease Maternal Grandfather         MI     Cancer - colorectal Paternal Grandfather      Diabetes Maternal Uncle      Thyroid Disease Sister         hypothyroidism     Neurologic Disorder Sister         MS diagnosed at 40y.o.     History   Drug Use No         Objective     /76   Pulse 78   Temp 98.3  F (36.8  C) (Tympanic)   Wt 88 kg (194 lb)   LMP 2011   SpO2 96%   BMI 32.79 kg/m      Physical Exam    GENERAL APPEARANCE: healthy, alert and no distress     EYES: EOMI, PERRL     HENT: ear canals and TM's normal and nose and mouth without ulcers or lesions     NECK: no adenopathy, no asymmetry, masses, or scars and thyroid normal to palpation     RESP: lungs clear to auscultation - no rales, rhonchi or wheezes     CV: regular rates and rhythm, normal S1 S2, no S3 or S4 and no murmur, click or rub     MS: extremities normal- no gross deformities noted, no evidence of inflammation in joints, FROM in all extremities.     SKIN: no suspicious lesions or rashes     NEURO: Normal strength and tone, sensory exam grossly normal, mentation intact and speech normal     PSYCH: mentation appears normal. and affect normal/bright     LYMPHATICS: No cervical adenopathy    Recent Labs   Lab Test 20  1428 19  0658 01/10/19  0838   HGB 14.6 13.8 14.7   PLT  --  265 337    144 140   POTASSIUM 4.2 4.2 4.3   CR 0.55 0.62 0.61        Diagnostics:  Recent Results (from the past 24 hour(s))   Hemoglobin    Collection Time: 20  4:36 PM   Result Value Ref Range    Hemoglobin 14.8 11.7 - 15.7 g/dL   Hemoglobin A1c    Collection Time: 20  4:36 PM   Result Value Ref Range    Hemoglobin A1C 5.9 (H) 0 - 5.6 %      EKG: Normal Sinus Rhythm, normal axis, normal  intervals, no acute ST/T changes c/w ischemia, no LVH by voltage criteria, unchanged from previous tracings    Revised Cardiac Risk Index (RCRI):  The patient has the following serious cardiovascular risks for perioperative complications:   - Coronary Artery Disease (MI, positive stress test, angina, Qs on EKG) = 1 point     RCRI Interpretation: 1 point: Class II (low risk - 0.9% complication rate)           Assessment & Plan   The proposed surgical procedure is considered INTERMEDIATE risk.    Jesenia was seen today for pre-op exam.    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 HOLD 5 days before surgery as directed.     RECOMMENDATION:  APPROVAL GIVEN to proceed with proposed procedure, without further diagnostic evaluation.    Signed Electronically by: Kelly Perdomo PA-C    Copy of this evaluation report is provided to requesting physician.    [facility] Guidelines    Revised Cardiac Risk Index

## 2020-12-16 DIAGNOSIS — Q24.5 ALCAPA (ANOMALOUS LEFT CORONARY ARTERY FROM THE PULMONARY ARTERY): ICD-10-CM

## 2020-12-16 DIAGNOSIS — E78.5 HYPERLIPIDEMIA LDL GOAL <100: ICD-10-CM

## 2020-12-18 RX ORDER — ATORVASTATIN CALCIUM 20 MG/1
20 TABLET, FILM COATED ORAL DAILY
Qty: 90 TABLET | Refills: 3 | Status: SHIPPED | OUTPATIENT
Start: 2020-12-18 | End: 2021-05-21

## 2020-12-26 DIAGNOSIS — Q24.5 ALCAPA (ANOMALOUS LEFT CORONARY ARTERY FROM THE PULMONARY ARTERY): ICD-10-CM

## 2020-12-28 RX ORDER — METOPROLOL SUCCINATE 25 MG/1
25 TABLET, EXTENDED RELEASE ORAL DAILY
Qty: 90 TABLET | Refills: 0 | OUTPATIENT
Start: 2020-12-28

## 2020-12-29 RX ORDER — METOPROLOL SUCCINATE 25 MG/1
25 TABLET, EXTENDED RELEASE ORAL DAILY
Qty: 90 TABLET | Refills: 3 | Status: SHIPPED | OUTPATIENT
Start: 2020-12-29 | End: 2021-12-09

## 2020-12-30 ENCOUNTER — CARE COORDINATION (OUTPATIENT)
Dept: CARDIOLOGY | Facility: CLINIC | Age: 55
End: 2020-12-30

## 2020-12-30 DIAGNOSIS — Z12.11 SCREEN FOR COLON CANCER: ICD-10-CM

## 2020-12-30 PROCEDURE — 82274 ASSAY TEST FOR BLOOD FECAL: CPT | Performed by: FAMILY MEDICINE

## 2021-01-04 LAB — HEMOCCULT STL QL IA: NEGATIVE

## 2021-01-08 NOTE — PROGRESS NOTES
Jesenia is a 55 year old who is being evaluated via a billable video visit.      How would you like to obtain your AVS? MyChart  If the video visit is dropped, the invitation should be resent by: Send to e-mail at: [email or web address]  Will anyone else be joining your video visit? Elham Camilo MA    Video Start Time: 3:35 Pm    Video-Visit Details    Type of service:  Video Visit    Video End Time:4:00 PM    Originating Location (pt. Location): Home    Distant Location (provider location):  [facility] SLEEP [facility]     Platform used for Video Visit: Hired    Sleep Consultation:    Date on this visit: 1/11/2021    Jesenia Fofana is sent by Shikha Wilson for a sleep consultation regarding possible sleep apnea.    Primary Physician: [facility]      Chief complaint: snoring, daytime sleepiness     Jesenia Fofana reports nightly snoring, poor quality of sleep and daytime sleepiness for last 3 years.     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     Jesenia does snore every night. Patient does have a regular bed partner. There is report of snoring and poor quality of sleep.  She does have witnessed apneas. They always sleep separately.  Patient sleeps on her side. She denies no morning headaches and restless legs. Jesenia denies any bruxism, sleep walking, sleep talking, dream enactment, sleep paralysis, cataplexy and hypnogogic/hypnopompic hallucinations.    Jesenia goes to sleep at 11:00 PM -12:00 AMduring the week. She wakes up at 7:30 AM without an alarm. She falls asleep in 15 minutes.  Jesenia denies difficulty falling asleep.  She wakes up 2-4 times a night for 10- 40 minutes before falling back to sleep.  Jesenia wakes up to uncertain reasons and pain.  On weekends, Jesenia goes to sleep at 12:00 AM.  She wakes up at 8:00 AM without an alarm. She falls asleep in 15 minutes.  Patient gets an  average of 7 hours of sleep per night.     Jesenia has difficulty breathing through her nose.      Patient's [place] Sleepiness score 15/24 consistent with daytime sleepiness.      Jesenia naps 1-2 times per week for 20-30 minutes, feels refreshed after naps. She takes some inadvertant naps.  She denies closing eyes, dozing and falling asleep while driving. Patient was counseled on the importance of driving while alert, to pull over if drowsy, or nap before getting into the vehicle if sleepy.      She uses 2 cups/day of coffee. Last caffeine intake is usually before noon.    Allergies:    Allergies   Allergen Reactions     Aspirin Hives       Medications:    Current Outpatient Medications   Medication Sig Dispense Refill     apixaban ANTICOAGULANT (ELIQUIS ANTICOAGULANT) 5 MG tablet Take 1 tablet (5 mg) by mouth 2 times daily 180 tablet 3     atorvastatin (LIPITOR) 20 MG tablet Take 1 tablet (20 mg) by mouth daily 90 tablet 3     clobetasol (TEMOVATE) 0.05 % external ointment Apply topically 2 times daily as needed (hand eczema) 45 g 0     metoprolol succinate ER (TOPROL-XL) 25 MG 24 hr tablet Take 1 tablet (25 mg) by mouth daily 90 tablet 3     naproxen sodium (ALEVE) 220 MG capsule Take 220 mg by mouth 2 times daily (with meals)         Problem List:  Patient Active Problem List    Diagnosis Date Noted     Health Care Home 02/03/2012     Priority: High     x  DX V65.8 REPLACED WITH 19763 HEALTH CARE HOME (04/08/2013)       Pre-diabetes 11/18/2020     Priority: Medium     ASCUS with positive high risk HPV cervical      Priority: Medium     3/11/11 LSIL, plan [place]. No record of [place] completed  11/6/20 ASCUS, +HR HPV, not 16/18. 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     Priority: Medium     Added  automatically from request for surgery [id number]       SOB (shortness of breath) 02/14/2019     Priority: Medium     Added automatically from request for surgery [id number]       Atypical chest pain 02/14/2019     Priority: Medium     Added automatically from request for surgery [id number]       Abnormal cardiovascular stress test 02/14/2019     Priority: Medium     Added automatically from request for surgery [id number]       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/29/2019     Priority: Medium     S/p repair 2000       Patient is Followed by [facility] 01/29/2019     Priority: Medium     Hyperlipidemia LDL goal <100 01/10/2012     Priority: Medium     Hyperthyroidism 04/27/2010     Priority: Medium        Past Medical/Surgical History:  Past Medical History:   Diagnosis Date     Abnormal Pap smear of cervix     2011, 2020     Grave's disease 04/01/2010     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) 01/01/1997    abnl left coronary artery defect     Past Surgical History:   Procedure Laterality Date     CV RIGHT HEART CATH N/A 3/7/2019    Procedure: RHC with Coronay Angiogram;  Surgeon: Shikha Wilson MD;  Location:  HEART CARDIAC CATH LAB     [facility] NONSPECIFIC PROCEDURE  1997    left coronary artery revision       Social History:  Social History     Socioeconomic History     Marital status:      Spouse name: Adolfo     Number of children: 2     Years of education: 14 years     Highest education level: Not on file   Occupational History     Occupation: management     Employer: [employer]     Comment: [employer]   Social Needs     Financial resource strain: Not on file     Food insecurity     Worry: Not on file     Inability: Not on file     Transportation needs     Medical: Not on file     Non-medical: Not on file   Tobacco Use     Smoking status: Former Smoker     Packs/day: 1.50     Years: 10.00     Pack years: 15.00     Types: Cigarettes      Quit date: 1991     Years since quittin.5     Smokeless tobacco: Never Used   Substance and Sexual Activity     Alcohol use: Yes     Alcohol/week: 0.0 standard drinks     Comment: socially     Drug use: No     Sexual activity: Yes     Partners: Male   Lifestyle     Physical activity     Days per week: Not on file     Minutes per session: Not on file     Stress: Not on file   Relationships     Social connections     Talks on phone: Not on file     Gets together: Not on file     Attends Restorationism service: Not on file     Active member of club or organization: Not on file     Attends meetings of clubs or organizations: Not on file     Relationship status: Not on file     Intimate partner violence     Fear of current or ex partner: Not on file     Emotionally abused: Not on file     Physically abused: Not on file     Forced sexual activity: Not on file   Other Topics Concern      Service No     Blood Transfusions No     Caffeine Concern No     Occupational Exposure Yes     Comment: work in [employer] Hazards No     Sleep Concern No     Stress Concern No     Weight Concern Yes     Comment: getting liposuction     Special Diet No     Back Care Not Asked     Exercise No     Bike Helmet No     Seat Belt Yes     Self-Exams Yes     Comment: sometimes     Parent/sibling w/ CABG, MI or angioplasty before 65F 55M? Not Asked   Social History Narrative     Not on file       Family History:  Family History   Problem Relation Age of Onset     Heart Disease Maternal Grandfather         MI     Cancer - colorectal Paternal Grandfather      Diabetes Maternal Uncle      Thyroid Disease Sister         hypothyroidism     Neurologic Disorder Sister         MS diagnosed at 40y.o.       Review of Systems:  A complete review of systems reviewed by me is negative with the exeption of what has been mentioned in the history of present illness.  CONSTITUTIONAL: NEGATIVE for weight gain/loss, fever, chills, sweats or  night sweats, drug allergies.  EYES: NEGATIVE for changes in vision, blind spots, double vision.  ENT: NEGATIVE for ear pain, sore throat, sinus pain, post-nasal drip, runny nose, bloody nose  CARDIAC: NEGATIVE for fast heartbeats or fluttering in chest, chest pain or pressure, breathlessness when lying flat, swollen legs or swollen feet.  NEUROLOGIC: NEGATIVE headaches, weakness or numbness in the arms or legs.  DERMATOLOGIC: NEGATIVE for rashes, new moles or change in mole(s)  PULMONARY: NEGATIVE SOB at rest, SOB with activity, dry cough, productive cough, coughing up blood, wheezing or whistling when breathing.    GASTROINTESTINAL: NEGATIVE for nausea or vomitting, loose or watery stools, fat or grease in stools, constipation, abdominal pain, bowel movements black in color or blood noted.  GENITOURINARY: NEGATIVE for pain during urination, blood in urine, urinating more frequently than usual, irregular menstrual periods.  MUSCULOSKELETAL: NEGATIVE for muscle pain, bone or joint pain, swollen joints.  ENDOCRINE: NEGATIVE for increased thirst or urination, diabetes.  LYMPHATIC: NEGATIVE for swollen lymph nodes, lumps or bumps in the breasts or nipple discharge.    Physical Examination:  Vitals: LMP 04/25/2011   BMI= There is no height or weight on file to calculate BMI.         [place] Total Score 1/8/2021   Total score - [place] 15       JEANNINE Total Score: 19 (01/08/21 0957)    GENERAL APPEARANCE: healthy, alert, active and no distress  RESP: Negative for cough, dyspnea   NEURO: mentation intact and speech normal  PSYCH: mentation appears normal and affect normal/bright    Impression/Plan:    1. Probable Obstructive sleep apnea   2. Excessive daytime sleepiness     - Patient presents with history of loud snoring, witnessed apneas and excessive daytime sleepiness. There is a high risk for sleep apnea and an overnight sleep study is recommended for assessment and treatment planning.     Plan:     1. Home sleep apnea  testing     Literature provided regarding sleep apnea.      She will follow up with me in approximately two weeks after her sleep study has been competed to review the results and discuss plan of care.       Polysomnography & HST reviewed.  Obstructive sleep apnea reviewed.  Complications of untreated sleep apnea were reviewed.    Dr. Wayne Martinez     CC: Shikha Wilson

## 2021-01-11 ENCOUNTER — VIRTUAL VISIT (OUTPATIENT)
Dept: SLEEP MEDICINE | Facility: CLINIC | Age: 56
End: 2021-01-11
Payer: COMMERCIAL

## 2021-01-11 ENCOUNTER — HOSPITAL ENCOUNTER (OUTPATIENT)
Dept: MAMMOGRAPHY | Facility: CLINIC | Age: 56
Discharge: HOME | End: 2021-01-11
Attending: FAMILY MEDICINE | Admitting: FAMILY MEDICINE
Payer: COMMERCIAL

## 2021-01-11 DIAGNOSIS — R29.818 SUSPECTED SLEEP APNEA: Primary | ICD-10-CM

## 2021-01-11 DIAGNOSIS — R06.81 WITNESSED APNEIC SPELLS: ICD-10-CM

## 2021-01-11 DIAGNOSIS — Z12.31 ENCOUNTER FOR SCREENING MAMMOGRAM FOR MALIGNANT NEOPLASM OF BREAST: ICD-10-CM

## 2021-01-11 DIAGNOSIS — G47.19 EXCESSIVE DAYTIME SLEEPINESS: ICD-10-CM

## 2021-01-11 DIAGNOSIS — R06.83 SNORING: ICD-10-CM

## 2021-01-11 PROCEDURE — 99204 OFFICE O/P NEW MOD 45 MIN: CPT | Mod: 95 | Performed by: INTERNAL MEDICINE

## 2021-01-11 PROCEDURE — 77067 SCR MAMMO BI INCL CAD: CPT

## 2021-02-03 ENCOUNTER — PATIENT OUTREACH (OUTPATIENT)
Dept: FAMILY MEDICINE | Facility: CLINIC | Age: 56
End: 2021-02-03

## 2021-02-08 ENCOUNTER — OFFICE VISIT (OUTPATIENT)
Dept: SLEEP MEDICINE | Facility: CLINIC | Age: 56
End: 2021-02-08
Attending: INTERNAL MEDICINE
Payer: COMMERCIAL

## 2021-02-08 DIAGNOSIS — R06.83 SNORING: ICD-10-CM

## 2021-02-08 DIAGNOSIS — R29.818 SUSPECTED SLEEP APNEA: ICD-10-CM

## 2021-02-08 DIAGNOSIS — G47.19 EXCESSIVE DAYTIME SLEEPINESS: ICD-10-CM

## 2021-02-08 DIAGNOSIS — G47.33 OSA (OBSTRUCTIVE SLEEP APNEA): ICD-10-CM

## 2021-02-08 DIAGNOSIS — R06.81 WITNESSED APNEIC SPELLS: ICD-10-CM

## 2021-02-08 PROCEDURE — G0399 HOME SLEEP TEST/TYPE 3 PORTA: HCPCS | Performed by: INTERNAL MEDICINE

## 2021-02-09 ENCOUNTER — DOCUMENTATION ONLY (OUTPATIENT)
Dept: SLEEP MEDICINE | Facility: CLINIC | Age: 56
End: 2021-02-09
Payer: COMMERCIAL

## 2021-02-09 NOTE — PROGRESS NOTES
This HSAT was performed using a Noxturnal T3 device which recorded snore, sound, movement activity, body position, nasal pressure, oronasal thermal airflow, pulse, oximetry and both chest and abdominal respiratory effort. HSAT data was restricted to the time patient states they were in bed.     HSAT was scored using 1B 4% hypopnea rule.     HST AHI (Non-PAT): 73.8  Snoring was reported as loud.  Time with SpO2 below 89% was 75.3 minutes.   Overall signal quality was good.     Pt will follow up with sleep provider to determine appropriate therapy.

## 2021-02-09 NOTE — PROGRESS NOTES
HST POST-STUDY QUESTIONNAIRE    1. What time did you go to bed?  6317  2. How long do you think it took to fall asleep?  20-30 min  3. What time did you wake up to start the day?  0647  4. Did you get up during the night at all?  no  5. If you woke up, do you remember approximately what time(s)? n/a  6. Did you have any difficulty with the equipment?  No  7. Did you us any type of treatment with this study?  None  8. Was the head of the bed elevated? No  9. Did you sleep in a recliner?  No  10. Did you stop using CPAP at least 3 days before this test?  NA  11. Any other information you'd like us to know? none

## 2021-02-11 PROBLEM — G47.33 OSA (OBSTRUCTIVE SLEEP APNEA): Status: ACTIVE | Noted: 2021-02-11

## 2021-02-11 NOTE — PROCEDURES
"HOME SLEEP STUDY INTERPRETATION    Patient: Jesenia Fofana  MRN: [id number]  YOB: 1965  Study Date: 2021  Referring Provider: Sharon Pemberton;   Ordering Provider: Wayne Martinze MD, MD     Indications for Home Study: Jesenia Fofana is a 55 year old female with a history of hyperthyroidism & congenital heart disease who presents with symptoms suggestive of obstructive sleep apnea.    Estimated body mass index is 32.79 kg/m  as calculated from the following:    Height as of 20: 1.638 m (5' 4.5\").    Weight as of 20: 88 kg (194 lb).  Total score - [place]: 15 (2021  9:57 AM)  STOP-BAN/8    Data: A full night home sleep study was performed recording the standard physiologic parameters including body position, movement, sound, nasal pressure, thermal oral airflow, chest and abdominal movements with respiratory inductance plethysmography, and oxygen saturation by pulse oximetry. Pulse rate was estimated by oximetry recording. This study was considered adequate based on > 4 hours of quality oximetry and respiratory recording. As specified by the AASM Manual for the Scoring of Sleep and Associated events, version 2.3, Rule VIII.D 1B, 4% oxygen desaturation scoring for hypopneas is used as a standard of care on all home sleep apnea testing.    Analysis Time:  462.5 minutes    Respiration:   Sleep Associated Hypoxemia: sustained hypoxemia was present. Baseline oxygen saturation was 99.5%.  Time with saturation less than or equal to 88% was 64.3 minutes. The lowest oxygen saturation was 65%.   Snoring: Snoring was present.  Respiratory events: The home study revealed a presence of 552 obstructive apneas and 12 mixed and central apneas. There were 5 hypopneas resulting in a combined apnea/hypopnea index [AHI] of 73.8 events per hour.  AHI was 73 per hour supine, 0 per hour prone, 65.3 per hour on left side, and 85 per hour on right side.   Pattern: Excluding events noted above, " respiratory rate and pattern was Normal.    Position: Percent of time spent: supine - 41.5%, prone - 0%, on left - 31.6%, on right - 26.8%.    Heart Rate: By pulse oximetry normal rate was noted.     Assessment:   Severe obstructive sleep apnea.  Sleep associated hypoxemia was present.    Recommendations:  CPAP therapy is recommended for severe obstructive sleep apnea. Consider auto-CPAP at 5-15 cmH2O.  Weight management.    Diagnosis Code(s): Obstructive Sleep Apnea G47.33, Hypoxemia G47.36    Wayne Martinez MD, February 11, 2021   Diplomate, American Board of Psychiatry and Neurology, Sleep Medicine

## 2021-02-22 ENCOUNTER — TELEPHONE (OUTPATIENT)
Dept: SLEEP MEDICINE | Facility: CLINIC | Age: 56
End: 2021-02-22

## 2021-02-22 NOTE — TELEPHONE ENCOUNTER
Reason for call:  Other   Patient called regarding (reason for call): call back  Additional comments: Please call patient back as soon as possible for the pre-charting. Her sleep evaluation is at 3pm on 2/23/21 and she is only available in the morning for a phone call.    Phone number to reach patient:  Cell number on file:    Telephone Information:   Mobile [phone]       Best Time:  Between 8am - 10am    Can we leave a detailed message on this number?  YES    Travel screening: Not Applicable

## 2021-02-23 ENCOUNTER — VIRTUAL VISIT (OUTPATIENT)
Dept: SLEEP MEDICINE | Facility: CLINIC | Age: 56
End: 2021-02-23
Payer: COMMERCIAL

## 2021-02-23 DIAGNOSIS — G47.33 OSA (OBSTRUCTIVE SLEEP APNEA): Primary | ICD-10-CM

## 2021-02-23 PROCEDURE — 99213 OFFICE O/P EST LOW 20 MIN: CPT | Mod: 95 | Performed by: INTERNAL MEDICINE

## 2021-02-23 NOTE — PROGRESS NOTES
Jesenia is a 55 year old who is being evaluated via a billable video visit.      How would you like to obtain your AVS? MyChart  If the video visit is dropped, the invitation should be resent by: Send to e-mail at: [email or web address]  Will anyone else be joining your video visit? Elham Camilo MA    Video Start Time: 2:56 PM  Video-Visit Details    Type of service:  Video Visit    Video End Time:3:06 PM    Originating Location (pt. Location): Home    Distant Location (provider location):  [facility]     Platform used for Video Visit: KienVe  Sleep Study Follow-Up Visit:    Date on this visit: 2/23/2021    Jesenia Fofana comes in today for follow-up of her home sleep study done on 2/8/21 at the [facility] for possible sleep apnea.    Respiratory events: The home study revealed a presence of 552 obstructive apneas and 12 mixed and central apneas. There were 5 hypopneas resulting in a combined apnea/hypopnea index [AHI] of 73.8 events per hour.  AHI was 73 per hour supine, 0 per hour prone, 65.3 per hour on left side, and 85 per hour on right side.   Pattern: Excluding events noted above, respiratory rate and pattern was Normal.    Sleep Associated Hypoxemia: sustained hypoxemia was present. Baseline oxygen saturation was 99.5%.  Time with saturation less than or equal to 88% was 64.3 minutes. The lowest oxygen saturation was 65%.   Snoring: Snoring was present.     Position: Percent of time spent: supine - 41.5%, prone - 0%, on left - 31.6%, on right - 26.8%.     Heart Rate: By pulse oximetry normal rate was noted.      Assessment:   Severe obstructive sleep apnea.  Sleep associated hypoxemia was present.    These findings were reviewed with patient.     Past medical/surgical history, family history, social history, medications and allergies were reviewed.      Problem List:  Patient Active Problem List    Diagnosis Date Noted     Health Care Home 02/03/2012      Priority: High     x  DX V65.8 REPLACED WITH 39379 HEALTH CARE HOME (04/08/2013)       MARY (obstructive sleep apnea) 02/11/2021     Priority: Medium     Severe MARY.        Pre-diabetes 11/18/2020     Priority: Medium     ASCUS with positive high risk HPV cervical      Priority: Medium     3/11/11 LSIL, plan [facility]. No record of [facility] completed  11/6/20 ASCUS, +HR HPV, not 16/18. Plan [facility] bef 2/6/21 11/16/20 Pt notified  01/6/21 mychart reminder, pt viewed.   02/3/21 [facility] not done. Tracking updated for 6 mo colp/pap due bef 05/6/21.        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     Priority: Medium     Added automatically from request for surgery [id number]       SOB (shortness of breath) 02/14/2019     Priority: Medium     Added automatically from request for surgery [id number]       Atypical chest pain 02/14/2019     Priority: Medium     Added automatically from request for surgery [id number]       Abnormal cardiovascular stress test 02/14/2019     Priority: Medium     Added automatically from request for surgery [id number]       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 Severe Obstructive sleep apnea  2. Sleep related hypoxemia     Patient was counseled regarding severe sleep apnea, consequences of untreated disease and management. CPAP therapy is recommended for severe sleep apnea which patient wants to start.     Plan:     1. Start auto PAP therapy 5-15 cm H2O      She will follow up with me in about 2 month(s).     I spent a total of 20 minutes for this  appointment today which include time spent before, during and after the visit for patient care, counseling and coordination of care.    Dr. Wayne Martinez     CC: [facility]

## 2021-02-26 ENCOUNTER — DOCUMENTATION ONLY (OUTPATIENT)
Dept: SLEEP MEDICINE | Facility: CLINIC | Age: 56
End: 2021-02-26

## 2021-02-26 NOTE — PROGRESS NOTES
Patient was offered choice of vendor and chose [facility].  Patient Jesenia Fofana was set up at [facility]  on February 26, 2021. Patient received a Resmed AirSense 10 Auto. Pressures were set at 5-15 cm H2O.   Patient s ramp is 5 cm H2O for Auto and FLEX/EPR is 2.  Patient received a Resmed Mask name: AIRFIT N20  Nasal mask size Small, heated tubing and heated humidifier.  Patient does not need to meet compliance. Patient has a follow up on WILL CALL TO SCHEDULE with Dr. Martinez.    Yamil Dykes     
Improved

## 2021-03-01 ENCOUNTER — DOCUMENTATION ONLY (OUTPATIENT)
Dept: SLEEP MEDICINE | Facility: CLINIC | Age: 56
End: 2021-03-01
Payer: COMMERCIAL

## 2021-03-01 DIAGNOSIS — G47.33 OSA (OBSTRUCTIVE SLEEP APNEA): ICD-10-CM

## 2021-03-01 NOTE — PROGRESS NOTES
3 DAY STM VISIT    Diagnostic AHI:  73.8 HST    Patient contacted for 3 day STM visit    Confirmed with patient at time of call- Yes Patient is still interested in STM service     Subjective measures:  Patient stated things going well with CPAP she has to work overnights this week.    Replacement device: No  STM ordered by provider: Yes     Device type: Auto-CPAP  PAP settings from order::  CPAP min 5 cm  H20       CPAP max 15 cm  H20  Mask type:    Nasal Mask     Device settings from machine      Min CPAP 5.0            Max CPAP 15.0          EPR level Setting: TWO      RESMED Soft response setting:  OFF  Assessment: Nightly usage, most nights under four hours.  Action plan: Patient to have 14 day STM visit. Patient has a follow up visit scheduled:   no.     Total time spent on accessing and  interpreting remote patient PAP therapy data  10 minutes  Total time spent counseling, coaching  and reviewing PAP therapy data with patient  3 minutes  [id number] no

## 2021-03-16 ENCOUNTER — DOCUMENTATION ONLY (OUTPATIENT)
Dept: SLEEP MEDICINE | Facility: CLINIC | Age: 56
End: 2021-03-16
Payer: COMMERCIAL

## 2021-03-16 DIAGNOSIS — G47.33 OSA (OBSTRUCTIVE SLEEP APNEA): ICD-10-CM

## 2021-03-16 NOTE — PROGRESS NOTES
14  DAY STM VISIT    Diagnostic AHI:  73.8 HST    Subjective measures:   Patient feeling great sleeping better and has more energy.  Patient has to get her left knee replaced in May.     Assessment: Pt meeting objective benchmarks.  Patient meeting subjective benchmarks.     Action plan: pt to have 30 day STM visit.      Device type: Auto-CPAP    PAP settings: CPAP min 5.0 cm  H20       CPAP max 15.0 cm  H20      95th% pressure 11.7 cm  H20        RESMED EPR level Setting: TWO    RESMED Soft response setting:  OFF    Mask type:  Nasal Mask    Objective measures: 14 day rolling measures      Compliance  100 %      Leak  4  lpm  last  upload      AHI 2.24   last  upload      Average number of minutes 471      Objective measure goal  Compliance   Goal >70%  Leak   Goal < 24 lpm  AHI  Goal < 5  Usage  Goal >240        Total time spent on accessing and interpreting remote patient PAP therapy data  10 minutes    Total time spent counseling, coaching  and reviewing PAP therapy data with patient  7 minutes    [id number]  [id number]  no (3 day STM)

## 2021-03-29 ENCOUNTER — DOCUMENTATION ONLY (OUTPATIENT)
Dept: SLEEP MEDICINE | Facility: CLINIC | Age: 56
End: 2021-03-29
Payer: COMMERCIAL

## 2021-03-29 DIAGNOSIS — G47.33 OSA (OBSTRUCTIVE SLEEP APNEA): ICD-10-CM

## 2021-03-29 NOTE — PROGRESS NOTES
30 DAY STM VISIT    Diagnostic AHI:  73.8 HST    Data only recheck     Assessment: Pt meeting objective benchmarks.     Action plan: pt to have 6 month STM visit  Patient has not scheduled a follow up visit.   Device type: Auto-CPAP  PAP settings: CPAP min 5.0 cm  H20     CPAP max 15.0 cm  H20    95th% pressure 11.7 cm  H20      RESMED EPR level Setting: TWO    RESMED Soft response setting:  OFF  Mask type:  Nasal Mask  Objective measures: 14 day rolling measures      Compliance  100 %      Leak  4.64 lpm  last  upload      AHI 2.67   last  upload      Average number of minutes 489      Objective measure goal  Compliance   Goal >70%  Leak   Goal < 24 lpm  AHI  Goal < 5  Usage  Goal >240        Total time spent on accessing and interpreting remote patient PAP therapy data  10 minutes    Total time spent counseling, coaching  and reviewing PAP therapy data with patient  0 minutes     [id number] this call  [id number] no  at 3 or 14 day [facility]

## 2021-04-21 ENCOUNTER — MEDICAL CORRESPONDENCE (OUTPATIENT)
Dept: HEALTH INFORMATION MANAGEMENT | Facility: CLINIC | Age: 56
End: 2021-04-21

## 2021-05-07 ENCOUNTER — OFFICE VISIT (OUTPATIENT)
Dept: FAMILY MEDICINE | Facility: CLINIC | Age: 56
End: 2021-05-07
Payer: COMMERCIAL

## 2021-05-07 VITALS
WEIGHT: 197.2 LBS | TEMPERATURE: 98.2 F | BODY MASS INDEX: 32.86 KG/M2 | HEART RATE: 67 BPM | DIASTOLIC BLOOD PRESSURE: 74 MMHG | OXYGEN SATURATION: 98 % | RESPIRATION RATE: 16 BRPM | HEIGHT: 65 IN | SYSTOLIC BLOOD PRESSURE: 110 MMHG

## 2021-05-07 DIAGNOSIS — Q24.5 ALCAPA (ANOMALOUS LEFT CORONARY ARTERY FROM THE PULMONARY ARTERY): ICD-10-CM

## 2021-05-07 DIAGNOSIS — E78.5 HYPERLIPIDEMIA LDL GOAL <100: ICD-10-CM

## 2021-05-07 DIAGNOSIS — Z01.818 PREOPERATIVE EXAMINATION: Primary | ICD-10-CM

## 2021-05-07 DIAGNOSIS — M17.12 ARTHRITIS OF LEFT KNEE: ICD-10-CM

## 2021-05-07 DIAGNOSIS — G47.33 OSA ON CPAP: ICD-10-CM

## 2021-05-07 LAB
ANION GAP SERPL CALCULATED.3IONS-SCNC: 7 MMOL/L (ref 3–14)
BUN SERPL-MCNC: 9 MG/DL (ref 7–30)
CALCIUM SERPL-MCNC: 8.8 MG/DL (ref 8.5–10.1)
CHLORIDE SERPL-SCNC: 107 MMOL/L (ref 94–109)
CO2 SERPL-SCNC: 27 MMOL/L (ref 20–32)
CREAT SERPL-MCNC: 0.72 MG/DL (ref 0.52–1.04)
GFR SERPL CREATININE-BSD FRML MDRD: >90 ML/MIN/{1.73_M2}
GLUCOSE SERPL-MCNC: 81 MG/DL (ref 70–99)
HGB BLD-MCNC: 14.3 G/DL (ref 11.7–15.7)
POTASSIUM SERPL-SCNC: 3.9 MMOL/L (ref 3.4–5.3)
SODIUM SERPL-SCNC: 141 MMOL/L (ref 133–144)

## 2021-05-07 PROCEDURE — 36415 COLL VENOUS BLD VENIPUNCTURE: CPT | Performed by: FAMILY MEDICINE

## 2021-05-07 PROCEDURE — 85018 HEMOGLOBIN: CPT | Performed by: FAMILY MEDICINE

## 2021-05-07 PROCEDURE — 99214 OFFICE O/P EST MOD 30 MIN: CPT | Performed by: FAMILY MEDICINE

## 2021-05-07 PROCEDURE — 80048 BASIC METABOLIC PNL TOTAL CA: CPT | Performed by: FAMILY MEDICINE

## 2021-05-07 ASSESSMENT — PAIN SCALES - GENERAL: PAINLEVEL: NO PAIN (1)

## 2021-05-07 ASSESSMENT — MIFFLIN-ST. JEOR: SCORE: 1472.43

## 2021-05-07 NOTE — PROGRESS NOTES
[address] [address]  Phone: [phone]  Primary Provider: Edgardo Mckeon MD        PREOPERATIVE EVALUATION:  Today's date: 5/7/2021    Jesenia Fofana is a 55 year old female who presents for a preoperative evaluation.    Surgical Information:  Surgery/Procedure: Left knee total replacement  Surgery Location: [facility]  Surgeon: Dr Berry  Surgery Date: May 24, 2021  Time of Surgery: 0830AM  Where patient plans to recover: At home with family  Fax number for surgical facility: Note does not need to be faxed, will be available electronically in Epic.    Type of Anesthesia Anticipated: Spinal    Assessment & Plan     The proposed surgical procedure is considered INTERMEDIATE risk.    Preoperative examination    - Hemoglobin  - Basic metabolic panel    Arthritis of left knee  Patient is currently taking naproxen.  Recommending to discontinue the medication 7 days before the surgery.    MARY on CPAP  Patient instructed to continue the use of CPAP machine before and after the procedure.  She is also asked to take her CPAP machine to the hospital in case she has to stay in hospital for few days.    Hyperlipidemia LDL goal <100  Well managed.  Patient is on statin.    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  Recommending to discontinue the medication 5 days before the surgery.  Patient was seen by cardiology can fall 2020.           RECOMMENDATION:  APPROVAL GIVEN to proceed with proposed procedure, without further diagnostic evaluation.          Subjective     HPI related to upcoming procedure:   Preop Questions 5/4/2021   1. Have you ever had a heart attack or stroke? UNKNOWN - Bypass surgery    2. Have you ever had surgery on your heart or blood vessels, such as a stent placement, a coronary artery bypass, or surgery on an artery in your head, neck, heart, or legs? YES -    3. Do you have chest  pain with activity? No   4. Do you have a history of  heart failure? UNKNOWN -    5. Do you currently have a cold, bronchitis or symptoms of other infection? No   6. Do you have a cough, shortness of breath, or wheezing? No   7. Do you or anyone in your family have previous history of blood clots? No   8. Do you or does anyone in your family have a serious bleeding problem such as prolonged bleeding following surgeries or cuts? No   9. Have you ever had problems with anemia or been told to take iron pills? No   10. Have you had any abnormal blood loss such as black, tarry or bloody stools, or abnormal vaginal bleeding? No   11. Have you ever had a blood transfusion? UNKNOWN -    12. Are you willing to have a blood transfusion if it is medically needed before, during, or after your surgery? Yes   13. Have you or any of your relatives ever had problems with anesthesia? No   14. Do you have sleep apnea, excessive snoring or daytime drowsiness? YES - uses CPAP    14a. Do you have a CPAP machine? Yes   15. Do you have any artifical heart valves or other implanted medical devices like a pacemaker, defibrillator, or continuous glucose monitor? No   16. Do you have artificial joints? YES - right knee replacement.    17. Are you allergic to latex? No   18. Is there any chance that you may be pregnant? No       Health Care Directive:  Patient does not have a Health Care Directive or Living Will:     Preoperative Review of :            Review of Systems  CONSTITUTIONAL: NEGATIVE for fever, chills, change in weight  INTEGUMENTARY/SKIN: NEGATIVE for worrisome rashes, moles or lesions  EYES: NEGATIVE for vision changes or irritation  ENT/MOUTH: NEGATIVE for ear, mouth and throat problems  RESP: NEGATIVE for significant cough or SOB  BREAST: NEGATIVE for masses, tenderness or discharge  CV: NEGATIVE for chest pain, palpitations or peripheral edema  GI: NEGATIVE for nausea, abdominal pain, heartburn, or change in bowel  habits  : NEGATIVE for frequency, dysuria, or hematuria  MUSCULOSKELETAL: NEGATIVE for significant arthralgias or myalgia  NEURO: NEGATIVE for weakness, dizziness or paresthesias  ENDOCRINE: NEGATIVE for temperature intolerance, skin/hair changes  HEME: NEGATIVE for bleeding problems  PSYCHIATRIC: NEGATIVE for changes in mood or affect    Patient Active Problem List    Diagnosis Date Noted     Health Care Home 02/03/2012     Priority: High     x  DX V65.8 REPLACED WITH [id number] HEALTH CARE HOME (04/08/2013)       MARY on CPAP 02/11/2021     Priority: Medium     Severe MARY.        Pre-diabetes 11/18/2020     Priority: Medium     ASCUS with positive high risk HPV cervical      Priority: Medium     3/11/11 LSIL, plan [place]. No record of [facility] completed  11/6/20 ASCUS, +HR HPV, not 16/18. Plan [place] bef 2/6/21 11/16/20 Pt notified  01/6/21 mychart reminder, pt viewed.   02/3/21 [place] not done. Tracking updated for 6 mo colp/pap due bef 05/6/21.        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     Priority: Medium     Added automatically from request for surgery [id number]       SOB (shortness of breath) 02/14/2019     Priority: Medium     Added automatically from request for surgery [id number]       Atypical chest pain 02/14/2019     Priority: Medium     Added automatically from request for surgery [id number]       Abnormal cardiovascular stress test 02/14/2019     Priority: Medium     Added automatically from request for surgery [id number]       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/29/2019     Priority: Medium     S/p repair 2000       Patient is Followed by the [facility] 01/29/2019     Priority: Medium     Hyperlipidemia LDL goal <100 01/10/2012     Priority: Medium     Hyperthyroidism 04/27/2010     Priority: Medium      Past  Medical History:   Diagnosis Date     Abnormal Pap smear of cervix     2020     Grave's disease 2010     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    abnl left coronary artery defect     Past Surgical History:   Procedure Laterality Date     CV RIGHT HEART CATH MEASUREMENTS RECORDED N/A 3/7/2019    Procedure: RHC with Coronay Angiogram;  Surgeon: Shikha Wilson MD;  Location:  HEART CARDIAC CATH LAB     [facility] NONSPECIFIC PROCEDURE      left coronary artery revision     Current Outpatient Medications   Medication Sig Dispense Refill     apixaban ANTICOAGULANT (ELIQUIS ANTICOAGULANT) 5 MG tablet Take 1 tablet (5 mg) by mouth 2 times daily 180 tablet 3     atorvastatin (LIPITOR) 20 MG tablet Take 1 tablet (20 mg) by mouth daily 90 tablet 3     clobetasol (TEMOVATE) 0.05 % external ointment Apply topically 2 times daily as needed (hand eczema) 45 g 0     metoprolol succinate ER (TOPROL-XL) 25 MG 24 hr tablet Take 1 tablet (25 mg) by mouth daily 90 tablet 3     naproxen sodium (ALEVE) 220 MG capsule Take 220 mg by mouth 2 times daily (with meals)         Allergies   Allergen Reactions     Aspirin Hives     Ibuprofen Hives        Social History     Tobacco Use     Smoking status: Former Smoker     Packs/day: 1.50     Years: 10.00     Pack years: 15.00     Types: Cigarettes     Quit date: 1991     Years since quittin.8     Smokeless tobacco: Never Used   Substance Use Topics     Alcohol use: Yes     Alcohol/week: 0.0 standard drinks     Comment: socially     Family History   Problem Relation Age of Onset     Heart Disease Maternal Grandfather         MI     Cancer - colorectal Paternal Grandfather      Diabetes Maternal Uncle      Thyroid Disease Sister         hypothyroidism     Neurologic Disorder Sister         MS diagnosed at 40y.o.     History   Drug Use No         Objective     /74   Pulse 67   Temp 98.2  F (36.8  C) (Tympanic)   Resp 16   Ht 1.638 m (5'  "4.5\")   Wt 89.4 kg (197 lb 3.2 oz)   LMP 04/25/2011   SpO2 98%   BMI 33.33 kg/m      Physical Exam    GENERAL APPEARANCE: healthy, alert and no distress     EYES: EOMI, PERRL     HENT: ear canals and TM's normal and nose and mouth without ulcers or lesions     NECK: no adenopathy, no asymmetry, masses, or scars and thyroid normal to palpation     RESP: lungs clear to auscultation - no rales, rhonchi or wheezes     CV: regular rates and rhythm, normal S1 S2, no S3 or S4 and no murmur, click or rub     ABDOMEN:  soft, nontender, no HSM or masses and bowel sounds normal     MS: extremities normal- no gross deformities noted, no evidence of inflammation in joints, FROM in all extremities.     SKIN: no suspicious lesions or rashes     NEURO: Normal strength and tone, sensory exam grossly normal, mentation intact and speech normal     PSYCH: mentation appears normal. and affect normal/bright     LYMPHATICS: No cervical adenopathy    Recent Labs   Lab Test 11/18/20  1636 05/26/20  1428   HGB 14.8 14.6   NA  --  140   POTASSIUM  --  4.2   CR  --  0.55   A1C 5.9*  --       Results for orders placed or performed in visit on 05/07/21   Hemoglobin     Status: None   Result Value Ref Range    Hemoglobin 14.3 11.7 - 15.7 g/dL   Basic metabolic panel     Status: None   Result Value Ref Range    Sodium 141 133 - 144 mmol/L    Potassium 3.9 3.4 - 5.3 mmol/L    Chloride 107 94 - 109 mmol/L    Carbon Dioxide 27 20 - 32 mmol/L    Anion Gap 7 3 - 14 mmol/L    Glucose 81 70 - 99 mg/dL    Urea Nitrogen 9 7 - 30 mg/dL    Creatinine 0.72 0.52 - 1.04 mg/dL    GFR Estimate >90 >60 mL/min/[1.73_m2]    GFR Estimate If Black >90 >60 mL/min/[1.73_m2]    Calcium 8.8 8.5 - 10.1 mg/dL         Revised Cardiac Risk Index (RCRI):  The patient has the following serious cardiovascular risks for perioperative complications:   - Coronary Artery Disease (MI, positive stress test, angina, Qs on EKG) = 1 point     RCRI Interpretation: 1 point: Class II " (low risk - 0.9% complication rate)           Signed Electronically by: Edgardo Mckeon MD  Copy of this evaluation report is provided to requesting physician.

## 2021-05-07 NOTE — H&P (VIEW-ONLY)
[address] [address]  Phone: [phone]  Primary Provider: Edgardo Mckeon MD        PREOPERATIVE EVALUATION:  Today's date: 5/7/2021    Jesenia Fofana is a 55 year old female who presents for a preoperative evaluation.    Surgical Information:  Surgery/Procedure: Left knee total replacement  Surgery Location: [facility]  Surgeon: Dr Berry  Surgery Date: May 24, 2021  Time of Surgery: 0830AM  Where patient plans to recover: At home with family  Fax number for surgical facility: Note does not need to be faxed, will be available electronically in Epic.    Type of Anesthesia Anticipated: Spinal    Assessment & Plan     The proposed surgical procedure is considered INTERMEDIATE risk.    Preoperative examination    - Hemoglobin  - Basic metabolic panel    Arthritis of left knee  Patient is currently taking naproxen.  Recommending to discontinue the medication 7 days before the surgery.    MARY on CPAP  Patient instructed to continue the use of CPAP machine before and after the procedure.  She is also asked to take her CPAP machine to the hospital in case she has to stay in hospital for few days.    Hyperlipidemia LDL goal <100  Well managed.  Patient is on statin.    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  Recommending to discontinue the medication 5 days before the surgery.  Patient was seen by cardiology can fall 2020.           RECOMMENDATION:  APPROVAL GIVEN to proceed with proposed procedure, without further diagnostic evaluation.          Subjective     HPI related to upcoming procedure:   Preop Questions 5/4/2021   1. Have you ever had a heart attack or stroke? UNKNOWN - Bypass surgery    2. Have you ever had surgery on your heart or blood vessels, such as a stent placement, a coronary artery bypass, or surgery on an artery in your head, neck, heart, or legs? YES -    3. Do you have chest  pain with activity? No   4. Do you have a history of  heart failure? UNKNOWN -    5. Do you currently have a cold, bronchitis or symptoms of other infection? No   6. Do you have a cough, shortness of breath, or wheezing? No   7. Do you or anyone in your family have previous history of blood clots? No   8. Do you or does anyone in your family have a serious bleeding problem such as prolonged bleeding following surgeries or cuts? No   9. Have you ever had problems with anemia or been told to take iron pills? No   10. Have you had any abnormal blood loss such as black, tarry or bloody stools, or abnormal vaginal bleeding? No   11. Have you ever had a blood transfusion? UNKNOWN -    12. Are you willing to have a blood transfusion if it is medically needed before, during, or after your surgery? Yes   13. Have you or any of your relatives ever had problems with anesthesia? No   14. Do you have sleep apnea, excessive snoring or daytime drowsiness? YES - uses CPAP    14a. Do you have a CPAP machine? Yes   15. Do you have any artifical heart valves or other implanted medical devices like a pacemaker, defibrillator, or continuous glucose monitor? No   16. Do you have artificial joints? YES - right knee replacement.    17. Are you allergic to latex? No   18. Is there any chance that you may be pregnant? No       Health Care Directive:  Patient does not have a Health Care Directive or Living Will:     Preoperative Review of :            Review of Systems  CONSTITUTIONAL: NEGATIVE for fever, chills, change in weight  INTEGUMENTARY/SKIN: NEGATIVE for worrisome rashes, moles or lesions  EYES: NEGATIVE for vision changes or irritation  ENT/MOUTH: NEGATIVE for ear, mouth and throat problems  RESP: NEGATIVE for significant cough or SOB  BREAST: NEGATIVE for masses, tenderness or discharge  CV: NEGATIVE for chest pain, palpitations or peripheral edema  GI: NEGATIVE for nausea, abdominal pain, heartburn, or change in bowel  habits  : NEGATIVE for frequency, dysuria, or hematuria  MUSCULOSKELETAL: NEGATIVE for significant arthralgias or myalgia  NEURO: NEGATIVE for weakness, dizziness or paresthesias  ENDOCRINE: NEGATIVE for temperature intolerance, skin/hair changes  HEME: NEGATIVE for bleeding problems  PSYCHIATRIC: NEGATIVE for changes in mood or affect    Patient Active Problem List    Diagnosis Date Noted     Health Care Home 02/03/2012     Priority: High     x  DX V65.8 REPLACED WITH 28870 HEALTH CARE HOME (04/08/2013)       MARY on CPAP 02/11/2021     Priority: Medium     Severe MARY.        Pre-diabetes 11/18/2020     Priority: Medium     ASCUS with positive high risk HPV cervical      Priority: Medium     3/11/11 LSIL, plan Amenia. No record of Amenia completed  11/6/20 ASCUS, +HR HPV, not 16/18. Plan Amenia bef 2/6/21 11/16/20 Pt notified  01/6/21 mychart reminder, pt viewed.   02/3/21 Amenia not done. Tracking updated for 6 mo colp/pap due bef 05/6/21.        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     Priority: Medium     Added automatically from request for surgery [id number]       SOB (shortness of breath) 02/14/2019     Priority: Medium     Added automatically from request for surgery [id number]       Atypical chest pain 02/14/2019     Priority: Medium     Added automatically from request for surgery [id number]       Abnormal cardiovascular stress test 02/14/2019     Priority: Medium     Added automatically from request for surgery [id number]       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/29/2019     Priority: Medium     S/p repair 2000       Patient [facility] 01/29/2019     Priority: Medium     Hyperlipidemia LDL goal <100 01/10/2012     Priority: Medium     Hyperthyroidism 04/27/2010     Priority: Medium      Past  Medical History:   Diagnosis Date     Abnormal Pap smear of cervix     2020     Grave's disease 2010     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    abnl left coronary artery defect     Past Surgical History:   Procedure Laterality Date     CV RIGHT HEART CATH MEASUREMENTS RECORDED N/A 3/7/2019    Procedure: RHC with Coronay Angiogram;  Surgeon: Shikha Wilson MD;  Location:  HEART CARDIAC CATH LAB     [facility] NONSPECIFIC PROCEDURE      left coronary artery revision     Current Outpatient Medications   Medication Sig Dispense Refill     apixaban ANTICOAGULANT (ELIQUIS ANTICOAGULANT) 5 MG tablet Take 1 tablet (5 mg) by mouth 2 times daily 180 tablet 3     atorvastatin (LIPITOR) 20 MG tablet Take 1 tablet (20 mg) by mouth daily 90 tablet 3     clobetasol (TEMOVATE) 0.05 % external ointment Apply topically 2 times daily as needed (hand eczema) 45 g 0     metoprolol succinate ER (TOPROL-XL) 25 MG 24 hr tablet Take 1 tablet (25 mg) by mouth daily 90 tablet 3     naproxen sodium (ALEVE) 220 MG capsule Take 220 mg by mouth 2 times daily (with meals)         Allergies   Allergen Reactions     Aspirin Hives     Ibuprofen Hives        Social History     Tobacco Use     Smoking status: Former Smoker     Packs/day: 1.50     Years: 10.00     Pack years: 15.00     Types: Cigarettes     Quit date: 1991     Years since quittin.8     Smokeless tobacco: Never Used   Substance Use Topics     Alcohol use: Yes     Alcohol/week: 0.0 standard drinks     Comment: socially     Family History   Problem Relation Age of Onset     Heart Disease Maternal Grandfather         MI     Cancer - colorectal Paternal Grandfather      Diabetes Maternal Uncle      Thyroid Disease Sister         hypothyroidism     Neurologic Disorder Sister         MS diagnosed at 40y.o.     History   Drug Use No         Objective     /74   Pulse 67   Temp 98.2  F (36.8  C) (Tympanic)   Resp 16   Ht 1.638 m (5'  "4.5\")   Wt 89.4 kg (197 lb 3.2 oz)   LMP 04/25/2011   SpO2 98%   BMI 33.33 kg/m      Physical Exam    GENERAL APPEARANCE: healthy, alert and no distress     EYES: EOMI, PERRL     HENT: ear canals and TM's normal and nose and mouth without ulcers or lesions     NECK: no adenopathy, no asymmetry, masses, or scars and thyroid normal to palpation     RESP: lungs clear to auscultation - no rales, rhonchi or wheezes     CV: regular rates and rhythm, normal S1 S2, no S3 or S4 and no murmur, click or rub     ABDOMEN:  soft, nontender, no HSM or masses and bowel sounds normal     MS: extremities normal- no gross deformities noted, no evidence of inflammation in joints, FROM in all extremities.     SKIN: no suspicious lesions or rashes     NEURO: Normal strength and tone, sensory exam grossly normal, mentation intact and speech normal     PSYCH: mentation appears normal. and affect normal/bright     LYMPHATICS: No cervical adenopathy    Recent Labs   Lab Test 11/18/20  1636 05/26/20  1428   HGB 14.8 14.6   NA  --  140   POTASSIUM  --  4.2   CR  --  0.55   A1C 5.9*  --       Results for orders placed or performed in visit on 05/07/21   Hemoglobin     Status: None   Result Value Ref Range    Hemoglobin 14.3 11.7 - 15.7 g/dL   Basic metabolic panel     Status: None   Result Value Ref Range    Sodium 141 133 - 144 mmol/L    Potassium 3.9 3.4 - 5.3 mmol/L    Chloride 107 94 - 109 mmol/L    Carbon Dioxide 27 20 - 32 mmol/L    Anion Gap 7 3 - 14 mmol/L    Glucose 81 70 - 99 mg/dL    Urea Nitrogen 9 7 - 30 mg/dL    Creatinine 0.72 0.52 - 1.04 mg/dL    GFR Estimate >90 >60 mL/min/[1.73_m2]    GFR Estimate If Black >90 >60 mL/min/[1.73_m2]    Calcium 8.8 8.5 - 10.1 mg/dL         Revised Cardiac Risk Index (RCRI):  The patient has the following serious cardiovascular risks for perioperative complications:   - Coronary Artery Disease (MI, positive stress test, angina, Qs on EKG) = 1 point     RCRI Interpretation: 1 point: Class II " (low risk - 0.9% complication rate)           Signed Electronically by: Edgardo Mckeon MD  Copy of this evaluation report is provided to requesting physician.

## 2021-05-09 DIAGNOSIS — Z11.59 ENCOUNTER FOR SCREENING FOR OTHER VIRAL DISEASES: ICD-10-CM

## 2021-05-21 DIAGNOSIS — Z11.59 ENCOUNTER FOR SCREENING FOR OTHER VIRAL DISEASES: ICD-10-CM

## 2021-05-21 LAB
SARS-COV-2 RNA RESP QL NAA+PROBE: NORMAL
SPECIMEN SOURCE: NORMAL

## 2021-05-21 PROCEDURE — U0003 INFECTIOUS AGENT DETECTION BY NUCLEIC ACID (DNA OR RNA); SEVERE ACUTE RESPIRATORY SYNDROME CORONAVIRUS 2 (SARS-COV-2) (CORONAVIRUS DISEASE [COVID-19]), AMPLIFIED PROBE TECHNIQUE, MAKING USE OF HIGH THROUGHPUT TECHNOLOGIES AS DESCRIBED BY CMS-2020-01-R: HCPCS | Performed by: ORTHOPAEDIC SURGERY

## 2021-05-21 PROCEDURE — U0005 INFEC AGEN DETEC AMPLI PROBE: HCPCS | Performed by: ORTHOPAEDIC SURGERY

## 2021-05-21 RX ORDER — ACETAMINOPHEN 325 MG/1
325-650 TABLET ORAL EVERY 6 HOURS PRN
Status: ON HOLD | COMMUNITY
End: 2021-05-25

## 2021-05-21 RX ORDER — ATORVASTATIN CALCIUM 20 MG/1
20 TABLET, FILM COATED ORAL EVERY EVENING
COMMUNITY
End: 2021-12-16

## 2021-05-21 RX ORDER — AMOXICILLIN 500 MG/1
2000 TABLET, FILM COATED ORAL DAILY PRN
COMMUNITY

## 2021-05-21 NOTE — PROGRESS NOTES
PTA medications updated by Medication Scribe prior to surgery via phone call with patient (last doses completed by Nurse)     Medication history sources: Patient, Surescripts, H&P and Patient's home med list  In the past week, patient estimated taking medication this percent of the time: Greater than 90%  Adherence assessment: N/A Not Observed    Significant changes made to the medication list:  None      Additional medication history information:   None    The information provided in this note is only as accurate as the sources available at the time of update(s)      Prior to Admission medications    Medication Sig Last Dose Taking? Auth Provider   acetaminophen (TYLENOL) 325 MG tablet Take 325-650 mg by mouth every 6 hours as needed for mild pain  at PRN Yes Reported, Patient   amoxicillin (AMOXIL) 500 MG tablet Take 2,000 mg by mouth daily as needed (1 HOUR PRIOR TO DENTAL VISITS) MORE THAN 1 MONTH at PRN Yes Reported, Patient   apixaban ANTICOAGULANT (ELIQUIS ANTICOAGULANT) 5 MG tablet Take 1 tablet (5 mg) by mouth 2 times daily 5/18/2021 Yes March, Shikha Lam MD   atorvastatin (LIPITOR) 20 MG tablet Take 20 mg by mouth every evening  at PM Yes Reported, Patient   clobetasol (TEMOVATE) 0.05 % external ointment Apply topically 2 times daily as needed (hand eczema)  at PRN Yes Edgardo Mckeon MD   metoprolol succinate ER (TOPROL-XL) 25 MG 24 hr tablet Take 1 tablet (25 mg) by mouth daily  at AM Yes March, Shikha Lam MD   naproxen sodium (ALEVE) 220 MG capsule Take 220 mg by mouth 2 times daily (with meals) 5/16/2021 Yes Reported, Patient   Psyllium (METAMUCIL PO) Take 1 tablet by mouth every evening  Yes Reported, Patient   Simethicone (GAS-X PO) Take 1 tablet by mouth daily as needed for intestinal gas  at PRN Yes Reported, Patient

## 2021-05-22 LAB
LABORATORY COMMENT REPORT: NORMAL
SARS-COV-2 RNA RESP QL NAA+PROBE: NEGATIVE
SPECIMEN SOURCE: NORMAL

## 2021-05-23 ENCOUNTER — ANESTHESIA EVENT (OUTPATIENT)
Dept: SURGERY | Facility: CLINIC | Age: 56
End: 2021-05-23
Payer: COMMERCIAL

## 2021-05-24 ENCOUNTER — HOSPITAL ENCOUNTER (OUTPATIENT)
Facility: CLINIC | Age: 56
Discharge: HOME | End: 2021-05-25
Attending: ORTHOPAEDIC SURGERY | Admitting: ORTHOPAEDIC SURGERY
Payer: COMMERCIAL

## 2021-05-24 ENCOUNTER — APPOINTMENT (OUTPATIENT)
Dept: PHYSICAL THERAPY | Facility: CLINIC | Age: 56
End: 2021-05-24
Attending: ORTHOPAEDIC SURGERY
Payer: COMMERCIAL

## 2021-05-24 ENCOUNTER — ANESTHESIA (OUTPATIENT)
Dept: SURGERY | Facility: CLINIC | Age: 56
End: 2021-05-24
Payer: COMMERCIAL

## 2021-05-24 ENCOUNTER — APPOINTMENT (OUTPATIENT)
Dept: GENERAL RADIOLOGY | Facility: CLINIC | Age: 56
End: 2021-05-24
Attending: SPECIALIST/TECHNOLOGIST
Payer: COMMERCIAL

## 2021-05-24 DIAGNOSIS — Q24.5 ALCAPA (ANOMALOUS LEFT CORONARY ARTERY FROM THE PULMONARY ARTERY): ICD-10-CM

## 2021-05-24 DIAGNOSIS — Z96.652 STATUS POST TOTAL KNEE REPLACEMENT, LEFT: Primary | ICD-10-CM

## 2021-05-24 PROBLEM — M17.12 OSTEOARTHRITIS OF LEFT KNEE, UNSPECIFIED OSTEOARTHRITIS TYPE: Status: ACTIVE | Noted: 2021-05-24

## 2021-05-24 LAB
CREAT SERPL-MCNC: 0.62 MG/DL (ref 0.52–1.04)
GFR SERPL CREATININE-BSD FRML MDRD: >90 ML/MIN/{1.73_M2}

## 2021-05-24 PROCEDURE — 97116 GAIT TRAINING THERAPY: CPT | Mod: GP | Performed by: PHYSICAL THERAPIST

## 2021-05-24 PROCEDURE — 99219 PR INITIAL OBSERVATION CARE,LEVEL II: CPT | Performed by: PHYSICIAN ASSISTANT

## 2021-05-24 PROCEDURE — 250N000011 HC RX IP 250 OP 636: Performed by: ANESTHESIOLOGY

## 2021-05-24 PROCEDURE — C1776 JOINT DEVICE (IMPLANTABLE): HCPCS | Performed by: ORTHOPAEDIC SURGERY

## 2021-05-24 PROCEDURE — 36415 COLL VENOUS BLD VENIPUNCTURE: CPT | Performed by: SPECIALIST/TECHNOLOGIST

## 2021-05-24 PROCEDURE — 710N000009 HC RECOVERY PHASE 1, LEVEL 1, PER MIN: Performed by: ORTHOPAEDIC SURGERY

## 2021-05-24 PROCEDURE — 82565 ASSAY OF CREATININE: CPT | Performed by: SPECIALIST/TECHNOLOGIST

## 2021-05-24 PROCEDURE — 250N000011 HC RX IP 250 OP 636: Performed by: ORTHOPAEDIC SURGERY

## 2021-05-24 PROCEDURE — 250N000024 HC ISOFLURANE, PER MIN: Performed by: ORTHOPAEDIC SURGERY

## 2021-05-24 PROCEDURE — 370N000017 HC ANESTHESIA TECHNICAL FEE, PER MIN: Performed by: ORTHOPAEDIC SURGERY

## 2021-05-24 PROCEDURE — 250N000013 HC RX MED GY IP 250 OP 250 PS 637: Performed by: SPECIALIST/TECHNOLOGIST

## 2021-05-24 PROCEDURE — 258N000003 HC RX IP 258 OP 636: Performed by: ORTHOPAEDIC SURGERY

## 2021-05-24 PROCEDURE — 258N000003 HC RX IP 258 OP 636: Performed by: NURSE ANESTHETIST, CERTIFIED REGISTERED

## 2021-05-24 PROCEDURE — 250N000011 HC RX IP 250 OP 636: Performed by: NURSE ANESTHETIST, CERTIFIED REGISTERED

## 2021-05-24 PROCEDURE — 258N000003 HC RX IP 258 OP 636: Performed by: ANESTHESIOLOGY

## 2021-05-24 PROCEDURE — 250N000011 HC RX IP 250 OP 636: Performed by: SPECIALIST/TECHNOLOGIST

## 2021-05-24 PROCEDURE — 278N000051 HC OR IMPLANT GENERAL: Performed by: ORTHOPAEDIC SURGERY

## 2021-05-24 PROCEDURE — 250N000009 HC RX 250: Performed by: ANESTHESIOLOGY

## 2021-05-24 PROCEDURE — 360N000077 HC SURGERY LEVEL 4, PER MIN: Performed by: ORTHOPAEDIC SURGERY

## 2021-05-24 PROCEDURE — 999N000141 HC STATISTIC PRE-PROCEDURE NURSING ASSESSMENT: Performed by: ORTHOPAEDIC SURGERY

## 2021-05-24 PROCEDURE — 999N000063 XR KNEE PORT LEFT 1/2 VIEWS: Mod: LT

## 2021-05-24 PROCEDURE — 93005 ELECTROCARDIOGRAM TRACING: CPT

## 2021-05-24 PROCEDURE — 97110 THERAPEUTIC EXERCISES: CPT | Mod: GP | Performed by: PHYSICAL THERAPIST

## 2021-05-24 PROCEDURE — 97161 PT EVAL LOW COMPLEX 20 MIN: CPT | Mod: GP | Performed by: PHYSICAL THERAPIST

## 2021-05-24 PROCEDURE — 999N000054 HC STATISTIC EKG NON-CHARGEABLE

## 2021-05-24 PROCEDURE — 272N000001 HC OR GENERAL SUPPLY STERILE: Performed by: ORTHOPAEDIC SURGERY

## 2021-05-24 PROCEDURE — 250N000009 HC RX 250: Performed by: NURSE ANESTHETIST, CERTIFIED REGISTERED

## 2021-05-24 DEVICE — UNIVERSAL TIBIAL BASEPLATE
Type: IMPLANTABLE DEVICE | Site: KNEE | Status: FUNCTIONAL
Brand: TRIATHLON

## 2021-05-24 DEVICE — CRUCIATE RETAINING FEMORAL
Type: IMPLANTABLE DEVICE | Site: KNEE | Status: FUNCTIONAL
Brand: TRIATHLON

## 2021-05-24 DEVICE — TIBIAL BEARING INSERT - CS
Type: IMPLANTABLE DEVICE | Site: KNEE | Status: FUNCTIONAL
Brand: TRIATHLON

## 2021-05-24 DEVICE — PATELLA
Type: IMPLANTABLE DEVICE | Site: KNEE | Status: FUNCTIONAL
Brand: TRIATHLON

## 2021-05-24 DEVICE — BONE CEMENT SIMPLEX FULL DOSE 6191-1-001: Type: IMPLANTABLE DEVICE | Site: KNEE | Status: FUNCTIONAL

## 2021-05-24 RX ORDER — FAMOTIDINE 20 MG/1
20 TABLET, FILM COATED ORAL 2 TIMES DAILY
Status: DISCONTINUED | OUTPATIENT
Start: 2021-05-24 | End: 2021-05-25 | Stop reason: HOSPADM

## 2021-05-24 RX ORDER — NALOXONE HYDROCHLORIDE 0.4 MG/ML
0.2 INJECTION, SOLUTION INTRAMUSCULAR; INTRAVENOUS; SUBCUTANEOUS
Status: ACTIVE | OUTPATIENT
Start: 2021-05-24 | End: 2021-05-25

## 2021-05-24 RX ORDER — NALOXONE HYDROCHLORIDE 0.4 MG/ML
0.4 INJECTION, SOLUTION INTRAMUSCULAR; INTRAVENOUS; SUBCUTANEOUS
Status: ACTIVE | OUTPATIENT
Start: 2021-05-24 | End: 2021-05-25

## 2021-05-24 RX ORDER — SODIUM CHLORIDE, SODIUM LACTATE, POTASSIUM CHLORIDE, CALCIUM CHLORIDE 600; 310; 30; 20 MG/100ML; MG/100ML; MG/100ML; MG/100ML
INJECTION, SOLUTION INTRAVENOUS CONTINUOUS
Status: DISCONTINUED | OUTPATIENT
Start: 2021-05-24 | End: 2021-05-25 | Stop reason: HOSPADM

## 2021-05-24 RX ORDER — POLYETHYLENE GLYCOL 3350 17 G/17G
1 POWDER, FOR SOLUTION ORAL DAILY
Qty: 7 PACKET | Refills: 0 | Status: SHIPPED | OUTPATIENT
Start: 2021-05-24 | End: 2021-12-09

## 2021-05-24 RX ORDER — ONDANSETRON 2 MG/ML
4 INJECTION INTRAMUSCULAR; INTRAVENOUS EVERY 30 MIN PRN
Status: DISCONTINUED | OUTPATIENT
Start: 2021-05-24 | End: 2021-05-24 | Stop reason: HOSPADM

## 2021-05-24 RX ORDER — HYDROMORPHONE HYDROCHLORIDE 1 MG/ML
.3-.5 INJECTION, SOLUTION INTRAMUSCULAR; INTRAVENOUS; SUBCUTANEOUS EVERY 5 MIN PRN
Status: DISCONTINUED | OUTPATIENT
Start: 2021-05-24 | End: 2021-05-24 | Stop reason: HOSPADM

## 2021-05-24 RX ORDER — ACETAMINOPHEN 325 MG/1
650 TABLET ORAL EVERY 4 HOURS PRN
Qty: 100 TABLET | Refills: 0 | Status: SHIPPED | OUTPATIENT
Start: 2021-05-24

## 2021-05-24 RX ORDER — EPHEDRINE SULFATE 50 MG/ML
INJECTION, SOLUTION INTRAMUSCULAR; INTRAVENOUS; SUBCUTANEOUS PRN
Status: DISCONTINUED | OUTPATIENT
Start: 2021-05-24 | End: 2021-05-24

## 2021-05-24 RX ORDER — ONDANSETRON 4 MG/1
4 TABLET, ORALLY DISINTEGRATING ORAL EVERY 6 HOURS PRN
Status: DISCONTINUED | OUTPATIENT
Start: 2021-05-24 | End: 2021-05-25 | Stop reason: HOSPADM

## 2021-05-24 RX ORDER — AMOXICILLIN 250 MG
1 CAPSULE ORAL 2 TIMES DAILY
Status: DISCONTINUED | OUTPATIENT
Start: 2021-05-24 | End: 2021-05-25 | Stop reason: HOSPADM

## 2021-05-24 RX ORDER — HYDROCODONE BITARTRATE AND ACETAMINOPHEN 5; 325 MG/1; MG/1
1-2 TABLET ORAL EVERY 6 HOURS PRN
Status: DISCONTINUED | OUTPATIENT
Start: 2021-05-24 | End: 2021-05-25

## 2021-05-24 RX ORDER — BISACODYL 10 MG
10 SUPPOSITORY, RECTAL RECTAL DAILY PRN
Status: DISCONTINUED | OUTPATIENT
Start: 2021-05-24 | End: 2021-05-25 | Stop reason: HOSPADM

## 2021-05-24 RX ORDER — LIDOCAINE HYDROCHLORIDE 20 MG/ML
INJECTION, SOLUTION INFILTRATION; PERINEURAL PRN
Status: DISCONTINUED | OUTPATIENT
Start: 2021-05-24 | End: 2021-05-24

## 2021-05-24 RX ORDER — ONDANSETRON 2 MG/ML
INJECTION INTRAMUSCULAR; INTRAVENOUS PRN
Status: DISCONTINUED | OUTPATIENT
Start: 2021-05-24 | End: 2021-05-24

## 2021-05-24 RX ORDER — SODIUM CHLORIDE, SODIUM LACTATE, POTASSIUM CHLORIDE, CALCIUM CHLORIDE 600; 310; 30; 20 MG/100ML; MG/100ML; MG/100ML; MG/100ML
INJECTION, SOLUTION INTRAVENOUS CONTINUOUS
Status: DISCONTINUED | OUTPATIENT
Start: 2021-05-24 | End: 2021-05-24 | Stop reason: HOSPADM

## 2021-05-24 RX ORDER — CEFAZOLIN SODIUM 2 G/100ML
2 INJECTION, SOLUTION INTRAVENOUS EVERY 8 HOURS
Status: COMPLETED | OUTPATIENT
Start: 2021-05-24 | End: 2021-05-25

## 2021-05-24 RX ORDER — CEFAZOLIN SODIUM 2 G/100ML
2 INJECTION, SOLUTION INTRAVENOUS
Status: COMPLETED | OUTPATIENT
Start: 2021-05-24 | End: 2021-05-24

## 2021-05-24 RX ORDER — FENTANYL CITRATE 50 UG/ML
INJECTION, SOLUTION INTRAMUSCULAR; INTRAVENOUS PRN
Status: DISCONTINUED | OUTPATIENT
Start: 2021-05-24 | End: 2021-05-24

## 2021-05-24 RX ORDER — DIPHENHYDRAMINE HCL 25 MG
25 CAPSULE ORAL EVERY 6 HOURS PRN
Status: DISCONTINUED | OUTPATIENT
Start: 2021-05-24 | End: 2021-05-25 | Stop reason: HOSPADM

## 2021-05-24 RX ORDER — POLYETHYLENE GLYCOL 3350 17 G/17G
17 POWDER, FOR SOLUTION ORAL DAILY
Status: DISCONTINUED | OUTPATIENT
Start: 2021-05-25 | End: 2021-05-25 | Stop reason: HOSPADM

## 2021-05-24 RX ORDER — PROCHLORPERAZINE MALEATE 5 MG
10 TABLET ORAL EVERY 6 HOURS PRN
Status: DISCONTINUED | OUTPATIENT
Start: 2021-05-24 | End: 2021-05-25 | Stop reason: HOSPADM

## 2021-05-24 RX ORDER — ACETAMINOPHEN 325 MG/1
975 TABLET ORAL ONCE
Status: COMPLETED | OUTPATIENT
Start: 2021-05-24 | End: 2021-05-24

## 2021-05-24 RX ORDER — LIDOCAINE 40 MG/G
CREAM TOPICAL
Status: DISCONTINUED | OUTPATIENT
Start: 2021-05-24 | End: 2021-05-25 | Stop reason: HOSPADM

## 2021-05-24 RX ORDER — DOCUSATE SODIUM 100 MG/1
100 CAPSULE, LIQUID FILLED ORAL 2 TIMES DAILY
Status: DISCONTINUED | OUTPATIENT
Start: 2021-05-24 | End: 2021-05-25 | Stop reason: HOSPADM

## 2021-05-24 RX ORDER — FENTANYL CITRATE 50 UG/ML
25-50 INJECTION, SOLUTION INTRAMUSCULAR; INTRAVENOUS
Status: DISCONTINUED | OUTPATIENT
Start: 2021-05-24 | End: 2021-05-24 | Stop reason: HOSPADM

## 2021-05-24 RX ORDER — PROPOFOL 10 MG/ML
INJECTION, EMULSION INTRAVENOUS CONTINUOUS PRN
Status: DISCONTINUED | OUTPATIENT
Start: 2021-05-24 | End: 2021-05-24

## 2021-05-24 RX ORDER — METOPROLOL SUCCINATE 25 MG/1
25 TABLET, EXTENDED RELEASE ORAL DAILY
Status: DISCONTINUED | OUTPATIENT
Start: 2021-05-25 | End: 2021-05-25 | Stop reason: HOSPADM

## 2021-05-24 RX ORDER — TRANEXAMIC ACID 650 MG/1
1950 TABLET ORAL ONCE
Status: COMPLETED | OUTPATIENT
Start: 2021-05-24 | End: 2021-05-24

## 2021-05-24 RX ORDER — BUPIVACAINE HYDROCHLORIDE 7.5 MG/ML
INJECTION, SOLUTION INTRASPINAL PRN
Status: DISCONTINUED | OUTPATIENT
Start: 2021-05-24 | End: 2021-05-24

## 2021-05-24 RX ORDER — FENTANYL CITRATE 50 UG/ML
25-50 INJECTION, SOLUTION INTRAMUSCULAR; INTRAVENOUS
Status: COMPLETED | OUTPATIENT
Start: 2021-05-24 | End: 2021-05-24

## 2021-05-24 RX ORDER — CEFAZOLIN SODIUM 2 G/100ML
2 INJECTION, SOLUTION INTRAVENOUS SEE ADMIN INSTRUCTIONS
Status: DISCONTINUED | OUTPATIENT
Start: 2021-05-24 | End: 2021-05-24 | Stop reason: HOSPADM

## 2021-05-24 RX ORDER — ONDANSETRON 4 MG/1
4 TABLET, ORALLY DISINTEGRATING ORAL EVERY 30 MIN PRN
Status: DISCONTINUED | OUTPATIENT
Start: 2021-05-24 | End: 2021-05-24 | Stop reason: HOSPADM

## 2021-05-24 RX ORDER — DEXAMETHASONE SODIUM PHOSPHATE 4 MG/ML
INJECTION, SOLUTION INTRA-ARTICULAR; INTRALESIONAL; INTRAMUSCULAR; INTRAVENOUS; SOFT TISSUE PRN
Status: DISCONTINUED | OUTPATIENT
Start: 2021-05-24 | End: 2021-05-24

## 2021-05-24 RX ORDER — HYDROMORPHONE HCL IN WATER/PF 6 MG/30 ML
0.2 PATIENT CONTROLLED ANALGESIA SYRINGE INTRAVENOUS
Status: DISCONTINUED | OUTPATIENT
Start: 2021-05-24 | End: 2021-05-25 | Stop reason: HOSPADM

## 2021-05-24 RX ORDER — METOPROLOL TARTRATE 1 MG/ML
5 INJECTION, SOLUTION INTRAVENOUS EVERY 6 HOURS
Status: DISCONTINUED | OUTPATIENT
Start: 2021-05-25 | End: 2021-05-24

## 2021-05-24 RX ORDER — AMOXICILLIN 250 MG
1-2 CAPSULE ORAL 2 TIMES DAILY
Qty: 30 TABLET | Refills: 0 | Status: SHIPPED | OUTPATIENT
Start: 2021-05-24 | End: 2021-12-09

## 2021-05-24 RX ORDER — ONDANSETRON 2 MG/ML
4 INJECTION INTRAMUSCULAR; INTRAVENOUS EVERY 6 HOURS PRN
Status: DISCONTINUED | OUTPATIENT
Start: 2021-05-24 | End: 2021-05-25 | Stop reason: HOSPADM

## 2021-05-24 RX ORDER — HYDROMORPHONE HYDROCHLORIDE 1 MG/ML
0.4 INJECTION, SOLUTION INTRAMUSCULAR; INTRAVENOUS; SUBCUTANEOUS
Status: DISCONTINUED | OUTPATIENT
Start: 2021-05-24 | End: 2021-05-25 | Stop reason: HOSPADM

## 2021-05-24 RX ADMIN — HYDROMORPHONE HYDROCHLORIDE 0.4 MG: 1 INJECTION, SOLUTION INTRAMUSCULAR; INTRAVENOUS; SUBCUTANEOUS at 14:39

## 2021-05-24 RX ADMIN — MIDAZOLAM HYDROCHLORIDE 1 MG: 1 INJECTION, SOLUTION INTRAMUSCULAR; INTRAVENOUS at 07:12

## 2021-05-24 RX ADMIN — CEFAZOLIN SODIUM 2 G: 2 INJECTION, SOLUTION INTRAVENOUS at 15:42

## 2021-05-24 RX ADMIN — MIDAZOLAM 1 MG: 1 INJECTION INTRAMUSCULAR; INTRAVENOUS at 07:39

## 2021-05-24 RX ADMIN — DEXAMETHASONE SODIUM PHOSPHATE 10 MG: 4 INJECTION, SOLUTION INTRA-ARTICULAR; INTRALESIONAL; INTRAMUSCULAR; INTRAVENOUS; SOFT TISSUE at 07:45

## 2021-05-24 RX ADMIN — FAMOTIDINE 20 MG: 20 TABLET ORAL at 14:34

## 2021-05-24 RX ADMIN — Medication 5 MG: at 08:31

## 2021-05-24 RX ADMIN — Medication 5 MG: at 08:43

## 2021-05-24 RX ADMIN — FAMOTIDINE 20 MG: 20 TABLET ORAL at 20:15

## 2021-05-24 RX ADMIN — HYDROMORPHONE HYDROCHLORIDE 0.5 MG: 1 INJECTION, SOLUTION INTRAMUSCULAR; INTRAVENOUS; SUBCUTANEOUS at 10:07

## 2021-05-24 RX ADMIN — MIDAZOLAM 1 MG: 1 INJECTION INTRAMUSCULAR; INTRAVENOUS at 08:00

## 2021-05-24 RX ADMIN — ACETAMINOPHEN 975 MG: 325 TABLET, FILM COATED ORAL at 06:15

## 2021-05-24 RX ADMIN — HYDROCODONE BITARTRATE AND ACETAMINOPHEN 1 TABLET: 5; 325 TABLET ORAL at 22:48

## 2021-05-24 RX ADMIN — PHENYLEPHRINE HYDROCHLORIDE 0.2 MCG/KG/MIN: 10 INJECTION INTRAVENOUS at 07:56

## 2021-05-24 RX ADMIN — FENTANYL CITRATE 50 MCG: 50 INJECTION, SOLUTION INTRAMUSCULAR; INTRAVENOUS at 07:12

## 2021-05-24 RX ADMIN — BUPIVACAINE HYDROCHLORIDE IN DEXTROSE 12 MG: 7.5 INJECTION, SOLUTION SUBARACHNOID at 07:41

## 2021-05-24 RX ADMIN — PROPOFOL 50 MCG/KG/MIN: 10 INJECTION, EMULSION INTRAVENOUS at 07:45

## 2021-05-24 RX ADMIN — LIDOCAINE HYDROCHLORIDE 20 MG: 20 INJECTION, SOLUTION INFILTRATION; PERINEURAL at 07:45

## 2021-05-24 RX ADMIN — ONDANSETRON 4 MG: 2 INJECTION INTRAMUSCULAR; INTRAVENOUS at 07:45

## 2021-05-24 RX ADMIN — DOCUSATE SODIUM 100 MG: 100 CAPSULE, LIQUID FILLED ORAL at 20:16

## 2021-05-24 RX ADMIN — HYDROCODONE BITARTRATE AND ACETAMINOPHEN 1 TABLET: 5; 325 TABLET ORAL at 16:28

## 2021-05-24 RX ADMIN — HYDROMORPHONE HYDROCHLORIDE 0.5 MG: 1 INJECTION, SOLUTION INTRAMUSCULAR; INTRAVENOUS; SUBCUTANEOUS at 10:50

## 2021-05-24 RX ADMIN — PHENYLEPHRINE HYDROCHLORIDE 100 MCG: 10 INJECTION INTRAVENOUS at 07:59

## 2021-05-24 RX ADMIN — TRANEXAMIC ACID 1950 MG: 650 TABLET ORAL at 06:15

## 2021-05-24 RX ADMIN — BUPIVACAINE HYDROCHLORIDE 15 ML: 5 INJECTION, SOLUTION EPIDURAL; INTRACAUDAL; PERINEURAL at 07:17

## 2021-05-24 RX ADMIN — FENTANYL CITRATE 25 MCG: 50 INJECTION, SOLUTION INTRAMUSCULAR; INTRAVENOUS at 07:39

## 2021-05-24 RX ADMIN — PHENYLEPHRINE HYDROCHLORIDE 100 MCG: 10 INJECTION INTRAVENOUS at 08:05

## 2021-05-24 RX ADMIN — PHENYLEPHRINE HYDROCHLORIDE 100 MCG: 10 INJECTION INTRAVENOUS at 07:55

## 2021-05-24 RX ADMIN — SENNOSIDES AND DOCUSATE SODIUM 1 TABLET: 8.6; 5 TABLET ORAL at 20:16

## 2021-05-24 RX ADMIN — CEFAZOLIN SODIUM 2 G: 2 INJECTION, SOLUTION INTRAVENOUS at 07:45

## 2021-05-24 RX ADMIN — SODIUM CHLORIDE, POTASSIUM CHLORIDE, SODIUM LACTATE AND CALCIUM CHLORIDE: 600; 310; 30; 20 INJECTION, SOLUTION INTRAVENOUS at 09:30

## 2021-05-24 RX ADMIN — SODIUM CHLORIDE, POTASSIUM CHLORIDE, SODIUM LACTATE AND CALCIUM CHLORIDE: 600; 310; 30; 20 INJECTION, SOLUTION INTRAVENOUS at 06:41

## 2021-05-24 ASSESSMENT — ENCOUNTER SYMPTOMS: SEIZURES: 0

## 2021-05-24 ASSESSMENT — MIFFLIN-ST. JEOR: SCORE: 1452.48

## 2021-05-24 NOTE — ANESTHESIA CARE TRANSFER NOTE
Patient: Jesenia Fofana    Procedure(s):  left total knee arthroplasty    Diagnosis: Degenerative arthritis of left knee [M17.12]  Diagnosis Additional Information: No value filed.    Anesthesia Type:   Spinal     Note:    Oropharynx: oropharynx clear of all foreign objects  Level of Consciousness: awake  Oxygen Supplementation: face mask  Level of Supplemental Oxygen (L/min / FiO2): 6  Independent Airway: airway patency satisfactory and stable  Dentition: dentition unchanged  Vital Signs Stable: post-procedure vital signs reviewed and stable  Report to RN Given: handoff report given  Patient transferred to: PACU  Comments: To PACU: Awake, good airway, 02 face mask, VSS  Report to RN  Handoff Report: Identifed the Patient, Identified the Reponsible Provider, Reviewed the pertinent medical history, Discussed the surgical course, Reviewed Intra-OP anesthesia mangement and issues during anesthesia, Set expectations for post-procedure period and Allowed opportunity for questions and acknowledgement of understanding      Vitals: (Last set prior to Anesthesia Care Transfer)  CRNA VITALS  5/24/2021 0900 - 5/24/2021 0939      5/24/2021             Resp Rate (set):  10        Electronically Signed By: CHRISTINA August CRNA  May 24, 2021  9:39 AM

## 2021-05-24 NOTE — PROGRESS NOTES
Patient vital signs are at baseline, Met  ~ Patient able to ambulate as they were prior to admission or with assist devices provided by therapies during their stay. Met  ~ Patient MUST void prior to discharge, Met  ~ Patient able to tolerate oral intake to maintain hydration status, Met  ~ Patient has adequate pain control using Oral analgesics, Met  ~ Hypercapnia, hypoventilation or hypoxia resolved for at least 2 hours without supplemental oxygen, Met  ~ Deficits in sensation, mobility or coordination have resolved if spinal or regional anesthesia was used, Met  ~ Patient has returned to baseline mental status Met    Pt is A/O X4. VSS. Pain managed with PRN [place]. A1 to the BR. Voiding adequately. Will continue to monitor.

## 2021-05-24 NOTE — PROGRESS NOTES
Arrived from Recovery room.  A&O, able to make needs known.   Oriented to room/unit.  Mild surgical pain, ice pack applied.  Ortho Stoplight Tool discussed w/ pt.

## 2021-05-24 NOTE — ANESTHESIA PROCEDURE NOTES
Adductor canal and Femoral Procedure Note  Pre-Procedure   Staff -        Anesthesiologist:  Ran Castro MD       Performed By: anesthesiologist       Location: pre-op       Pre-Anesthestic Checklist: patient identified, IV checked, site marked, risks and benefits discussed, informed consent, monitors and equipment checked, pre-op evaluation, at physician/surgeon's request and post-op pain management  Timeout:       Correct Patient: Yes        Correct Procedure: Yes        Correct Site: Yes        Correct Position: Yes        Correct Laterality: Yes        Site Marked: Yes  Procedure Documentation  Procedure: Adductor canal, Femoral       Patient Position: supine       Patient Prep/Sterile Barriers: sterile gloves, mask, patient draped       Skin prep: Chloraprep      Local skin infiltrated with mL of 1% lidocaine.        Needle Type: other       Needle Gauge: 22.        Needle Length (millimeters): 90        Ultrasound guided       1. Ultrasound was used to identify targeted nerve, plexus, vascular marker, or fascial plane and place a needle adjacent to it in real-time.       2. Ultrasound was used to visualize the spread of anesthetic in close proximity to the above referenced structure.       3. A permanent image is entered into the patient's record.    Assessment/Narrative         The placement was negative for: blood aspirated, painful injection and site bleeding       Paresthesias: No.     Bolus given via needle..        Secured via.        Insertion/Infusion Method: Single Shot       Complications: none    Comments:  Bupivacaine 0.5% with 1:400,000 epi 15ml   Patient sedated but commutative throughout the procedure.   Patient tolerated well.    Ultrasound Interpretation, peripheral nerve block    1.  Under ultrasound guidance, the needle was inserted and placed in close proximity to the nerve.  2. Ultrasound was also used to visualize the spread of the anesthetic in close proximity to the nerve  being blocked.  3. The nerve(s) appeared anatomically normal.   4. There were no apparent abnormal pathological findings.  5. A permanent ultrasound image was saved n the patient's record.    The surgeon has given a verbal order transferring this pt to me for a performance of regional analgesia block for post op pain control. It is requested of me because I am trained and qualifed to perform this block and the surgeon is nether trained nor qualified to perform this procedure.

## 2021-05-24 NOTE — PROCEDURES
OPERATIVE REPORT - TKA    DATE OF SERVICE:  5/24/2021    ATTENDING: SLADE PARR    SURGEON:  SLADE PARR    ASSISTANT:    Jesenia Hendricks PA-C    PREOPERATIVE DIAGNOSIS:  Left knee osteoarthritis    POSTOPERATIVE DIAGNOSIS:   Same     PROCEDURES PERFORMED:   Left Total Knee Arthroplasty.      ANESTHESIA:  1. Spinal   2. Adductor canal block    ESTIMATED BLOOD LOSS:   25 mL    TRANSFUSIONS:  none    FLUIDS:   Per anesthesia    SPECIMENS:  Arthroplasty resection materials.    DRAIN:  None    COMPLICATIONS:  None    TOURNIQUET TIME:  53 minutes at 200 mmHg    INDICATIONS:   The patient is a very pleasant 55 year old female who has had persistent complaints of knee pain for some time now. The pain interferes with activities of daily living including sitting, standing, and ambulating short distances. The physical examination showed a painful and limited range of motion of the left knee.  The x-ray showed bone-on-bone arthritis of the left knee.     The patient has tried nonoperative measures to control their pain including Tylenol, oral anti-inflammatories, activity modification, low impact exercises, assistive devices, injections, none of which have provided satisfactory pain relief. The patient desires joint replacement of the knee to improve their lifestyle and reduce their pain.      Preoperatively, the risks, benefits, and possible complications of the procedure were discussed. The risks discussed included but were not limited to wear, loosening, infection, neurovascular damage, thromboembolic disease, foot drop, limb length inequality, persistent pain, stiffness and dislocation. All of the questions were satisfactorily answered and the patient wished to proceed with joint replacement surgery to improve their lifestyle and reduce their pain.       IMPLANTS:  1. Femoral component:  [place] Triathlon CR Lt size 2  2. Tibial component: Jimenez Triathlon [place] size 3   3. Poly insert:  Jimenez  Triathlon X3 CS 10 mm  4. Patella: Jimenez Triathlon 31 x 9 mm symmetric  5. Bone cement:  Simplex     PROCEDURE  The patient was brought to the operating room after adequate induction of Anesthesia. The patient had a tourniquet placed on the thigh and the lower extremity were prepped and draped free in the usual sterile fashion using a Betadine scrub and ChloraPrep paint.    At this point a time-out procedure was carried out to identify the patient, the appropriate operative side, the implants, the code status, the fire risk, the preoperative medications and to confirm that the patient received 2 grams of ancef within one hour of the onset of the procedure.  Following completion of this, we proceeded with the case    The leg was elevated and exsanguinated, flexed to 90 degrees and the tourniquet was inflated to 200 mmHg. A midline incision was performed. This allowed creation of full thickness subcutaneous flaps.  A midvastus arthrotomy was used.  The meniscal tibial ligaments were released as feasible medially and laterally. The medial collateral ligament was subperiosteally elevated to the origin of the semimembranosus. The accessible anterior menisci were resected. The retropatellar fat pad was partially excised. The patella was dislocated into the lateral gutter and the knee was flexed to 90 degrees.     With the knee at 90 degrees flexion, the femoral intramedullary guide jesús was placed and set for 5 degrees of valgus and impacted into position. The distal femoral resection was made.  The femur was sized to a 2, the 4-in-1 block was pinned in place with the appropriate amount of external rotation matching the transepicondylar axis and perpendicular to [place]'s line.  The anterior posterior and chamfer osteotomies were completed. The residual osteophytes were removed from the periphery of the femur.     The tibia stabilized in an anterior subluxed position and neutral sagittal alignment. The intramedullary  cutting apparatus was placed down. We pinned the block in place, performed the proximal tibial resection using the oscillating saw and cutting block with 3 degrees of posterior inclination.  The tibial resection was checked with the intramedullary based checking apparatus and rasp. The tibia was sized to a 3. We put the tibial component in the appropriate amount of external rotation with the center between the middle and medial thirds of the tibial tuberosity. The proximal tibia was filled with morselized bone graft to permit cement extravasation.     The posterior osteophytes were resected using the curved half inch osteotome. The patella was everted. The patellar osteotomy, free hand, using the oscillating saw. Multiple quadrants were checked multiple times until the appropriate thickness was confirmed with the caliper.  The patella sized for a 31 mm patella. The patella was drilled through the patellar template.    The trial components were placed.  The leg came out to full extension and was nicely balanced with a 10 mm CS insert.  The knee was stable to varus and valgus stress in full extension. and mid flexion.  Our patellar tracking was checked.  The patella showed no tilt or subluxation and the patella engaging both condyles at 90 degrees. Satisfied with the patellar alignment, without any need for further releases, we removed the trial components. We prepared the bony surface for cementing.    The bony surfaces of the femur, tibia and patella were prepared for cementing.  Pulse irrigation was used on the femur, tibia and patella. The surfaces were then gauze dried. The femur, tibia and patella were cemented in place using vacuum mixed cement. Alignment, Range of Motion, Stability and Patellar tracking were appropriate.    The wound was closed in a layered fashion. The skin was brought together, everted and approximated with staples. Sterile dressings were applied. The patient was awakened and transported  postanesthesia care in stable condition at the end of the case. Sponge and needle counts were correct.     Hemostasis was obtained throughout the procedure and prior to the closure of each layer using electrocautery. Pulsatile irrigation and dilute betadine irrigation were used throughout the procedure. Surgical Body Exhaust Systems and high exchange air flow were used during the procedure. The patient received 2 grams of ancef prior to tourniquet inflation and within 1 hours of the start of the procedure for antibiotic prophylaxis.    POSTOPERATIVE PLAN  1. WBAT.   2. Antibiotic Prophylaxis were given 2 grams of ancef. Antibiotics were given within 1 hour of surgical incision and discontinued within 24 hours.  3. Pain control with Oxycodone and Tylenol  4. Follow up with Dr. Berry in 10-14 days. [phone].   5.  DVT prophylaxis Lovenox and sequential compression devices will be started within 24 hours of surgery. Can resume Eliquis on POD #3  6. Plan discharge when meeting therapy goals and patient is medically stable  7. Caution with NSAID usage.  8. Continue home CPAP postoperatively.  Postoperative respiratory consult.    Carlitos Berry MD  [facility]  [phone]  [phone]  Primary Care Physician Edgardo Mckeon

## 2021-05-24 NOTE — PROGRESS NOTES
05/24/21 1506   Quick Adds   Type of Visit Initial PT Evaluation   Living Environment   People in home spouse;child(alfredo), adult   Current Living Arrangements house   Transportation Anticipated car, drives self;family or friend will provide   Living Environment Comments one story home   Self-Care   Usual Activity Tolerance good   Current Activity Tolerance moderate   Regular Exercise No   Equipment Currently Used at Home crutches  (using one crutch, has a walker)   Activity/Exercise/Self-Care Comment independent with ADLs, IADL, works from home   Disability/Function   Walking or Climbing Stairs Difficulty yes   Walking or Climbing Stairs ambulation difficulty, requires equipment;stair climbing difficulty, requires equipment   Fall history within last six months no   Change in Functional Status Since Onset of Current Illness/Injury yes   General Information   Onset of Illness/Injury or Date of Surgery 05/24/21   Referring Physician Dr. Berry   Patient/Family Therapy Goals Statement (PT) to go home with assist from family and OP PT   Pertinent History of Current Problem (include personal factors and/or comorbidities that impact the POC) pt is a 55 year old female POD#0 left TKA   Weight-Bearing Status - LLE weight-bearing as tolerated   Cognition   Orientation Status (Cognition) oriented x 4   Pain Assessment   Patient Currently in Pain Yes, see Vital Sign flowsheet   Range of Motion (ROM)   ROM Comment WFL except for left LE consistent with surgery   Strength   Strength Comments WFL except for left LE consistent with surgery   Bed Mobility   Comment (Bed Mobility) supine to and from sit CGA with bed mostly flat   Transfers   Transfer Safety Comments sit to stand: SBA and uses walker for support. cues for leg position   Gait/Stairs (Locomotion)   Distance in Feet (Required for LE Total Joints) 125   Comment (Gait/Stairs) Pt ambulated with FWW CGA and assist wth IV. See flowsheet   Balance   Balance Comments  requires AD   Sensory Examination   Sensory Perception Comments Pt still has decreased sensation left LE since surgery   Coordination   Coordination no deficits were identified   Clinical Impression   Criteria for Skilled Therapeutic Intervention yes, treatment indicated   PT Diagnosis (PT) impaired gait   Influenced by the following impairments pain, decreased left knee ROM and strength   Functional limitations due to impairments below baseline for bedmob, transfers, gait   Clinical Presentation Stable/Uncomplicated   Clinical Presentation Rationale clinical judgment   Clinical Decision Making (Complexity) low complexity   Therapy Frequency (PT) 2x/day   Predicted Duration of Therapy Intervention (days/wks) 2 days   Planned Therapy Interventions (PT) bed mobility training;gait training;home exercise program;patient/family education;ROM (range of motion);strengthening;transfer training   Anticipated Equipment Needs at Discharge (PT)   (has a walker, crutches)   Risk & Benefits of therapy have been explained evaluation/treatment results reviewed;care plan/treatment goals reviewed;risks/benefits reviewed;participants voiced agreement with care plan;patient   PT Discharge Planning    PT Discharge Recommendation (DC Rec) home with assist;home with outpatient physical therapy   PT Rationale for DC Rec PT is below baseline for functional mobility but anticipate pt will be able to go home with use of walker and assist of spouse as needed and recommend OP PT to maximize function of left knee   PT Brief overview of current status  CGA bed mobility, SBA and walker for transfers, gait 125ft with FWW SBA   Total Evaluation Time   Total Evaluation Time (Minutes) 5

## 2021-05-24 NOTE — ANESTHESIA POSTPROCEDURE EVALUATION
Patient: Jesenia Fofana    Procedure(s):  left total knee arthroplasty    Diagnosis:Degenerative arthritis of left knee [M17.12]  Diagnosis Additional Information: No value filed.    Anesthesia Type:  Spinal    Note:  Disposition: Admission   Postop Pain Control: Uneventful            Sign Out: Well controlled pain   PONV: No   Neuro/Psych: Uneventful            Sign Out: Acceptable/Baseline neuro status   Airway/Respiratory: Uneventful            Sign Out: Acceptable/Baseline resp. status   CV/Hemodynamics: Uneventful            Sign Out: Acceptable CV status; No obvious hypovolemia; No obvious fluid overload   Other NRE: NONE   DID A NON-ROUTINE EVENT OCCUR? No           Last vitals:  Vitals:    05/24/21 1235 05/24/21 1335 05/24/21 1439   BP: 135/84 129/76 129/74   Pulse: 77 88 79   Resp: 16 18 19   Temp:      SpO2: 95% 96% 96%       Last vitals prior to Anesthesia Care Transfer:  CRNA VITALS  5/24/2021 0900 - 5/24/2021 1000      5/24/2021             NIBP:  90/53    Pulse:  80    SpO2:  97 %          Electronically Signed By: Ran Castro MD  May 24, 2021  2:48 PM

## 2021-05-24 NOTE — CONSULTS
[facility]  Consult Note - Hospitalist Service     Date of Admission:  5/24/2021  Consult Requested by: Jesenia Mccurdy PA-C  Reason for Consult: Post-op co-medical management of chronic conditions following Left TKA  PRIMARY CARE PROVIDER:    Edgardo Mckeon    Assessment & Plan   Jesenia Fofana is a 55 year old female admitted on 5/24/2021.    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      OA with degenerative changes of the left knee s/p left TKA  POD# 0.   - Orthopedic Service is managing.   --Defer analgesic management, DVT prophylaxis, PT/OT.     --Defer resumption of Eliquis to Ortho (PCDs and subcutaneous lovenox with plan to restart Eliquis POD #3).    - HGB check in the morning.    - Encourage utilization of incentive spirometer.     Obesity with associated MARY  Body mass index is 32.58 kg/m .  Patient is compliant with CPAP.  Increase in all-cause morbidity and mortality.   - Encourage weight loss.  - Follow up with PCP regarding ongoing management.    - Continue CPAP with home settings.      HTN  - Resumed on PTA Toprol-XL 25 mg/d with hold parameters.      HLP  - Hold PTA Lipitor 20 mg/d and resume at discharge.      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  Patient is s/p left coronary artery revision 1997.    - Defer resumption of Eliquis 5 mg BID.     --PCDs and subcutaneous lovenox with plan to restart Eliquis POD #3.    Hyperthyroidism with a history of Grave's disease  Noted episode of thyroid storm in 2015.  This does not appear to have ever been treated.  Previous TSH levels have been normal since 2015 but Free T4 has been slightly elevated in 2020.    - No interventions at this time.      Diet: Advance  Diet as Tolerated: Regular Diet Adult  Discharge Instruction - Regular Diet Adult     DVT Prophylaxis: Defer to primary service as to when PTA Eliquis can be resumed   Roe Catheter: not present  Code Status: Full Code   Disposition Plan    Expected discharge: Per Ortho.  Entered: Noel Gonsalez PA-C 05/24/2021, 12:56 PM        The patient's care was discussed with the Patient.    The patient has been discussed with Dr. Menezes, who agrees with the assessment and plan at this time. Dr. Menezes will evaluate the patient independently.     At this time, I'd like to thank Jesenia Mccurdy PA-C for consulting the Hospitalist service.  We will sign off.  Please reconsult if any questions or concerns arise.          Noel Gonsalez PA-C  [facility]    ______________________________________________________________________    Chief Complaint   Elective left TKA.    History is obtained from the patient and EMR.      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      Surgery was performed under spinal anesthesia with adductor canal block.  EBL was documented at 25 ml.      Patient was resting comfortably in bed upon arrival and finishing her lunch.  Initially, we reviewed patient's past medical history and surgical history as well as her home medications.    Upon questioning, patient indicated that she occasionally has some swelling of her right foot due to overuse (walking or standing too long).  She is currently taking Metamucil daily and has no further issues with constipation.  She has had ongoing knee pain which led to the surgery.  Due to her blood thinning agent she does bruise quite easily.  She has occasional lightheadedness  that seems more positional.    Reviewed plans with patient in regards to management of blood pressure (hold parameters).  Patient understood and agreed with this plan.        Review of Systems   The 10 point Review of Systems is negative other than noted in the HPI.    Past Medical History    I have reviewed this patient's medical history and updated it with pertinent information if needed.   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  Past Surgical History:   Procedure Laterality Date     CARDIAC SURGERY      CABG     CV RIGHT HEART CATH MEASUREMENTS RECORDED N/A 3/7/2019    Procedure: RHC with Coronay Angiogram;  Surgeon: Shikha Wilson MD;  Location:  HEART CARDIAC CATH LAB     ORTHOPEDIC SURGERY      right total knee     ZZC NONSPECIFIC PROCEDURE      left coronary artery revision       Social History   I have reviewed this patient's social history and updated it with pertinent information if needed.  Patient resides in a house with her  and daughter.  She is a former smoker who quit in .  She consumes alcohol socially.  She lucero snot use illicit drugs.  She has been utilizing a crutch and CPAP.    Social History     Tobacco Use     Smoking status: Former Smoker     Packs/day: 1.50     Years: 10.00     Pack years: 15.00     Types: Cigarettes     Quit date: 1991     Years since quittin.8     Smokeless tobacco: Never Used   Substance Use Topics     Alcohol use: Yes     Alcohol/week: 0.0 standard drinks      Comment: socially     Drug use: No       Family History   I have reviewed this patient's family history and updated it with pertinent information if needed.   Family History   Problem Relation Age of Onset     Heart Disease Maternal Grandfather         MI     Cancer - colorectal Paternal Grandfather      Diabetes Maternal Uncle      Thyroid Disease Sister         hypothyroidism     Neurologic Disorder Sister         MS diagnosed at 40y.o.   Sister (1): MS  Sister (2): Hashimoto's    Medications   Prior to Admission Medications   Prescriptions Last Dose Informant Patient Reported? Taking?   Psyllium (METAMUCIL PO) 5/23/2021 at 1800 Self Yes Yes   Sig: Take 1 tablet by mouth every evening   Simethicone (GAS-X PO) Past Week at PRN Self Yes Yes   Sig: Take 1 tablet by mouth daily as needed for intestinal gas   acetaminophen (TYLENOL) 325 MG tablet 5/23/2021 at 2200 Self Yes Yes   Sig: Take 325-650 mg by mouth every 6 hours as needed for mild pain   amoxicillin (AMOXIL) 500 MG tablet More than a month at PRN Self Yes No   Sig: Take 2,000 mg by mouth daily as needed (1 HOUR PRIOR TO DENTAL VISITS)   apixaban ANTICOAGULANT (ELIQUIS ANTICOAGULANT) 5 MG tablet 5/16/2021 at Unknown time Self No Yes   Sig: Take 1 tablet (5 mg) by mouth 2 times daily   atorvastatin (LIPITOR) 20 MG tablet 5/23/2021 at 2200 Self Yes Yes   Sig: Take 20 mg by mouth every evening   clobetasol (TEMOVATE) 0.05 % external ointment Past Week at PRN Self No Yes   Sig: Apply topically 2 times daily as needed (hand eczema)   metoprolol succinate ER (TOPROL-XL) 25 MG 24 hr tablet 5/24/2021 at 0445 Self No Yes   Sig: Take 1 tablet (25 mg) by mouth daily   naproxen sodium (ALEVE) 220 MG capsule 5/18/2021 Self Yes Yes   Sig: Take 220 mg by mouth 2 times daily (with meals)      Facility-Administered Medications: None     Allergies   Allergies   Allergen Reactions     Aspirin Hives     Ibuprofen Hives       Physical Exam   Vital Signs: Temp: 97.4  F (36.3  C)  Temp src: Temporal BP: 128/79 Pulse: 75   Resp: 18 SpO2: 98 % O2 Device: None (Room air)    Weight: 192 lbs 12.8 oz    Constitutional: Awake, alert, cooperative, no apparent distress.    ENT: Normocephalic, without obvious abnormality, atraumatic, oral pharynx with moist mucus membranes, tonsils without erythema or exudates.  Eyes pupils are equal, round and reactive to light; extra occular movements intact.  Normal sclera.    Neck: Supple, symmetrical, trachea midline, no adenopathy.  Pulmonary: No increased work of breathing, good air exchange, clear to auscultation bilaterally, no crackles or wheezing.  Cardiovascular: Regular rate and rhythm, normal S1 and S2, no S3 or S4, and no murmur noted.  GI: Normal bowel sounds, soft, non-distended, non-tender.  Obese.  Skin/Integumen: Clear.  Neuro: CN II-XII grossly intact.  Upper extremities strength, coordination and sensation intact bilaterally.  Deferred lower extremity assessment due to post-op state.  Dorsal and plantar flexion intact and even bilaterally.    Psych:  Alert and oriented x 3. Normal affect.  Extremities: No lower extremity edema noted, and calves are non-tender to palpation bilaterally.      Data   Results for orders placed or performed during the hospital encounter of 05/24/21 (from the past 24 hour(s))   EKG 12-lead, tracing only   Result Value Ref Range    Interpretation ECG Click View Image link to view waveform and result    XR Knee Port Left 1/2 Views    Narrative    LEFT KNEE PORTABLE ONE TO TWO VIEWS   5/24/2021 9:58 AM     HISTORY: Postop total knee.    COMPARISON: X-ray from 2/27/2019.      Impression    IMPRESSION: Left total knee arthroplasty in place. No evidence of  complication.

## 2021-05-24 NOTE — ANESTHESIA PREPROCEDURE EVALUATION
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    abnl left coronary artery defect     Sleep apnea     cpap      Past Surgical History:   Procedure Laterality Date     CARDIAC SURGERY      CABG     CV RIGHT HEART CATH MEASUREMENTS RECORDED N/A 3/7/2019    Procedure: RHC with Coronay Angiogram;  Surgeon: Shikha Wilson MD;  Location:  HEART CARDIAC CATH LAB     ORTHOPEDIC SURGERY      right total knee     ZZC NONSPECIFIC PROCEDURE      left coronary artery revision      Allergies   Allergen Reactions     Aspirin Hives     Ibuprofen Hives      Social History     Tobacco Use     Smoking status: Former Smoker     Packs/day: 1.50     Years: 10.00     Pack years: 15.00     Types: Cigarettes     Quit date: 1991     Years since quittin.8     Smokeless tobacco: Never Used   Substance Use Topics     Alcohol use: Yes     Alcohol/week: 0.0 standard drinks     Comment: socially      Wt Readings from Last 1 Encounters:   21 87.5 kg (192 lb 12.8 oz)        Anesthesia Evaluation   Pt has had prior anesthetic.     No history of anesthetic complications       ROS/MED HX  ENT/Pulmonary:     (+) sleep apnea, uses CPAP,  (-) asthma   Neurologic:    (-) no seizures and no CVA   Cardiovascular: Comment: ALCAPA    (+) Dyslipidemia -----Taking blood thinners Pt has received instructions: CHF Last EF: 40     METS/Exercise Tolerance:     Hematologic:       Musculoskeletal:   (+) arthritis,     GI/Hepatic:    (-) GERD and liver disease   Renal/Genitourinary:    (-) renal disease   Endo:     (+) thyroid problem,  (-) Type II DM   Psychiatric/Substance  Use:       Infectious Disease:       Malignancy:       Other:            Physical Exam    Airway        Mallampati: II   TM distance: > 3 FB   Neck ROM: full   Mouth opening: > 3 cm    Respiratory Devices and Support         Dental  no notable dental history         Cardiovascular   cardiovascular exam normal          Pulmonary   pulmonary exam normal                OUTSIDE LABS:  CBC:   Lab Results   Component Value Date    WBC 6.2 03/07/2019    WBC 9.3 01/10/2019    HGB 14.3 05/07/2021    HGB 14.8 11/18/2020    HCT 40.5 03/07/2019    HCT 42.3 01/10/2019     03/07/2019     01/10/2019     BMP:   Lab Results   Component Value Date     05/07/2021     05/26/2020    POTASSIUM 3.9 05/07/2021    POTASSIUM 4.2 05/26/2020    CHLORIDE 107 05/07/2021    CHLORIDE 106 05/26/2020    CO2 27 05/07/2021    CO2 29 05/26/2020    BUN 9 05/07/2021    BUN 10 05/26/2020    CR 0.72 05/07/2021    CR 0.55 05/26/2020    GLC 81 05/07/2021     (H) 11/06/2020     COAGS:   Lab Results   Component Value Date    INR 1.04 04/15/2010     POC:   Lab Results   Component Value Date    HCG  04/05/2010     Negative   This test provides a presumptive diagnosis of pregnancy or non-pregnancy. A   confirmed pregnancy diagnosis should only be made by a physician after all   clinical and laboratory findings have been evaluated.     HEPATIC:   Lab Results   Component Value Date    ALBUMIN 4.3 05/26/2020    PROTTOTAL 7.7 05/26/2020    ALT 42 05/26/2020    AST 26 05/26/2020    ALKPHOS 112 05/26/2020    BILITOTAL 0.4 05/26/2020     OTHER:   Lab Results   Component Value Date    A1C 5.9 (H) 11/18/2020    JOSEPHINE 8.8 05/07/2021    MAG 1.8 04/14/2010    LIPASE 72 01/03/2011    AMYLASE 61 04/05/2010    TSH 2.54 11/06/2020    T4 1.53 (H) 05/26/2020    CRP 23.6 (H) 04/05/2010    SED 67 (H) 04/14/2010       Anesthesia Plan    ASA Status:  3      Anesthesia Type: Spinal.              Consents    Anesthesia Plan(s) and associated risks,  benefits, and realistic alternatives discussed. Questions answered and patient/representative(s) expressed understanding.     - Discussed with:  Patient         Postoperative Care    Pain management: Peripheral nerve block (Single Shot), Multi-modal analgesia.   PONV prophylaxis: Ondansetron (or other 5HT-3)     Comments:                Ran Castro MD

## 2021-05-24 NOTE — ANESTHESIA PROCEDURE NOTES
Intrathecal Procedure Note  Pre-Procedure   Staff -        Anesthesiologist:  Ran Castro MD       Performed By: anesthesiologist       Location: OR       Pre-Anesthestic Checklist: patient identified, IV checked, site marked, risks and benefits discussed, informed consent, monitors and equipment checked, pre-op evaluation, at physician/surgeon's request and post-op pain management  Timeout:       Correct Patient: Yes        Correct Procedure: Yes        Correct Site: Yes        Correct Position: Yes   Procedure Documentation  Procedure: intrathecal       Patient Position: sitting       Patient Prep/Sterile Barriers: sterile gloves, mask, patient draped       Skin prep: Betadine       Insertion Site: L3-4. (midline approach).       Spinal Needle Type: Deon tip       Introducer used      # of attempts: 1 and  # of redirects:     Assessment/Narrative         Paresthesias: No.       CSF fluid: clear.

## 2021-05-24 NOTE — PROGRESS NOTES
Patient vital signs are at baseline: Yes  Patient able to ambulate as they were prior to admission or with assist devices provided by therapies during their stay:  No,  Reason:  PT at 1500  Patient MUST void prior to discharge:  Yes  Patient able to tolerate oral intake:  Yes  Pain has adequate pain control using Oral analgesics:  No,  Reason:  Pre-medicated w/ Tylenol and Dilaudid IV prior to PT

## 2021-05-25 ENCOUNTER — APPOINTMENT (OUTPATIENT)
Dept: PHYSICAL THERAPY | Facility: CLINIC | Age: 56
End: 2021-05-25
Attending: ORTHOPAEDIC SURGERY
Payer: COMMERCIAL

## 2021-05-25 VITALS
DIASTOLIC BLOOD PRESSURE: 74 MMHG | TEMPERATURE: 98.4 F | HEART RATE: 74 BPM | OXYGEN SATURATION: 92 % | WEIGHT: 192.8 LBS | SYSTOLIC BLOOD PRESSURE: 138 MMHG | BODY MASS INDEX: 32.12 KG/M2 | HEIGHT: 65 IN | RESPIRATION RATE: 16 BRPM

## 2021-05-25 LAB
GLUCOSE SERPL-MCNC: 149 MG/DL (ref 70–99)
HGB BLD-MCNC: 13 G/DL (ref 11.7–15.7)
INTERPRETATION ECG - MUSE: NORMAL
PLATELET # BLD AUTO: 323 10E9/L (ref 150–450)

## 2021-05-25 PROCEDURE — 250N000013 HC RX MED GY IP 250 OP 250 PS 637: Performed by: PHYSICIAN ASSISTANT

## 2021-05-25 PROCEDURE — 82947 ASSAY GLUCOSE BLOOD QUANT: CPT | Performed by: SPECIALIST/TECHNOLOGIST

## 2021-05-25 PROCEDURE — 250N000011 HC RX IP 250 OP 636: Performed by: SPECIALIST/TECHNOLOGIST

## 2021-05-25 PROCEDURE — 97116 GAIT TRAINING THERAPY: CPT | Mod: GP | Performed by: PHYSICAL THERAPY ASSISTANT

## 2021-05-25 PROCEDURE — 97110 THERAPEUTIC EXERCISES: CPT | Mod: GP | Performed by: PHYSICAL THERAPY ASSISTANT

## 2021-05-25 PROCEDURE — 85049 AUTOMATED PLATELET COUNT: CPT | Performed by: SPECIALIST/TECHNOLOGIST

## 2021-05-25 PROCEDURE — 36415 COLL VENOUS BLD VENIPUNCTURE: CPT | Performed by: SPECIALIST/TECHNOLOGIST

## 2021-05-25 PROCEDURE — 96372 THER/PROPH/DIAG INJ SC/IM: CPT | Performed by: SPECIALIST/TECHNOLOGIST

## 2021-05-25 PROCEDURE — 97530 THERAPEUTIC ACTIVITIES: CPT | Mod: GP | Performed by: PHYSICAL THERAPY ASSISTANT

## 2021-05-25 PROCEDURE — 250N000013 HC RX MED GY IP 250 OP 250 PS 637: Performed by: SPECIALIST/TECHNOLOGIST

## 2021-05-25 PROCEDURE — 85018 HEMOGLOBIN: CPT | Performed by: SPECIALIST/TECHNOLOGIST

## 2021-05-25 RX ORDER — NALOXONE HYDROCHLORIDE 0.4 MG/ML
0.2 INJECTION, SOLUTION INTRAMUSCULAR; INTRAVENOUS; SUBCUTANEOUS
Status: DISCONTINUED | OUTPATIENT
Start: 2021-05-25 | End: 2021-05-25 | Stop reason: HOSPADM

## 2021-05-25 RX ORDER — NALOXONE HYDROCHLORIDE 0.4 MG/ML
0.4 INJECTION, SOLUTION INTRAMUSCULAR; INTRAVENOUS; SUBCUTANEOUS
Status: DISCONTINUED | OUTPATIENT
Start: 2021-05-25 | End: 2021-05-25 | Stop reason: HOSPADM

## 2021-05-25 RX ORDER — HYDROCODONE BITARTRATE AND ACETAMINOPHEN 5; 325 MG/1; MG/1
1-2 TABLET ORAL EVERY 4 HOURS PRN
Status: DISCONTINUED | OUTPATIENT
Start: 2021-05-25 | End: 2021-05-25 | Stop reason: HOSPADM

## 2021-05-25 RX ORDER — HYDROCODONE BITARTRATE AND ACETAMINOPHEN 5; 325 MG/1; MG/1
1-2 TABLET ORAL EVERY 4 HOURS PRN
Qty: 30 TABLET | Refills: 0 | Status: SHIPPED | OUTPATIENT
Start: 2021-05-25 | End: 2021-12-09

## 2021-05-25 RX ADMIN — CEFAZOLIN SODIUM 2 G: 2 INJECTION, SOLUTION INTRAVENOUS at 01:05

## 2021-05-25 RX ADMIN — ENOXAPARIN SODIUM 40 MG: 40 INJECTION SUBCUTANEOUS at 08:11

## 2021-05-25 RX ADMIN — FAMOTIDINE 20 MG: 20 TABLET ORAL at 08:11

## 2021-05-25 RX ADMIN — METOPROLOL SUCCINATE 25 MG: 25 TABLET, EXTENDED RELEASE ORAL at 08:11

## 2021-05-25 RX ADMIN — HYDROMORPHONE HYDROCHLORIDE 0.4 MG: 1 INJECTION, SOLUTION INTRAMUSCULAR; INTRAVENOUS; SUBCUTANEOUS at 01:05

## 2021-05-25 RX ADMIN — SENNOSIDES AND DOCUSATE SODIUM 1 TABLET: 8.6; 5 TABLET ORAL at 08:11

## 2021-05-25 RX ADMIN — HYDROCODONE BITARTRATE AND ACETAMINOPHEN 2 TABLET: 5; 325 TABLET ORAL at 10:30

## 2021-05-25 RX ADMIN — DOCUSATE SODIUM 100 MG: 100 CAPSULE, LIQUID FILLED ORAL at 08:11

## 2021-05-25 RX ADMIN — HYDROCODONE BITARTRATE AND ACETAMINOPHEN 1 TABLET: 5; 325 TABLET ORAL at 04:49

## 2021-05-25 RX ADMIN — HYDROMORPHONE HYDROCHLORIDE 0.4 MG: 1 INJECTION, SOLUTION INTRAMUSCULAR; INTRAVENOUS; SUBCUTANEOUS at 06:17

## 2021-05-25 NOTE — PLAN OF CARE
Discharge goals - Orthopedic (Discharge Orders - Overnight Stay (OUTPATIENT in bed)) PRIOR TO DISCHARGE:    ~ Patient vital signs are at baseline; Met  ~ Patient able to ambulate as they were prior to admission or with assist devices provided by therapies during their stay; Met   ~ Patient MUST void prior to discharge; Met  ~ Patient able to tolerate oral intake to maintain hydration status; Met   ~ Patient has adequate pain control using Oral analgesics; Met   ~ Hypercapnia, hypoventilation or hypoxia resolved for at least 2 hours without supplemental oxygen; Met   ~ Deficits in sensation, mobility or coordination have resolved if spinal or regional anesthesia was used; Met   ~ Patient has returned to baseline mental status; Met     Pt Aox4, VSS on RA, CMS intact, SBA w/ GB and walker. Pain controlled w/ PRN Norco and ice. L knee ACE removed- Aquacel intact- CDI. Tolerating regular diet, adequate fluid intake. PIV removed for discharge. Voiding w/o difficulty, + flatus, + BS, - BM this AM. Discharge medications and instructions reviewed prior to discharge. Pt discharging home w/ daughter.

## 2021-05-25 NOTE — DISCHARGE SUMMARY
HOSPITAL DISCHARGE SUMMARY    Patient Name: Jesenia Fofana  YOB: 1965  Age: 55 year old  Medical Record Number: [id number]  Primary Physician: Edgardo Mckeon  Phone: [phone]  Admission Date: 5/24/2021  Discharge Date: 5/25/2021    She will be discharged from 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 Preoperative x-rays revealed arthritic changes in the left knee.  The risks, benefits and possible complications of the above procedure were discussed with the patient. Patient elected to proceed to improve their lifestyle. Informed consent was obtained. For further details, please refer to the H&P in the chart.    The patient was admitted on 5/24/2021 and underwent the above-mentioned procedure. Patient tolerated this procedure well. Postoperatively, patient was seen in consultation by the internal medicine hospitalist service. Throughout the hospitalization, wound remained clean. The patient was started and advanced in a diet. CMS remained intact. Patient was seen and evaluated by  "physical therapy and rehab program was initiated. Patient was also seen by occupational therapy. Hemoglobin on day of discharge was stable.    COMPLICATIONS IN HOSPITAL: complications: None    PERTINENT FINDINGS/RESULTS AT DISCHARGE:   Blood pressure 138/74, pulse 74, temperature 98.4  F (36.9  C), temperature source Oral, resp. rate 16, height 1.638 m (5' 4.5\"), weight 87.5 kg (192 lb 12.8 oz), last menstrual period 04/25/2011, SpO2 92 %.      Subjective: Patient feels good today.  Pain controlled. Discharge instructions reviewed.    PE:   The patient is awake and alert  Calves are soft and non-tender.   Sensation is intact.  Dorsiflexion and plantar flexion is intact.  Foot warm with nl cap refill.  The incision is incision: covered and with AG dressing .      Latest Laboratory Results:  Chem:  Recent Labs   Lab Test 05/07/21  1100 05/26/20  1428   POTASSIUM 3.9 4.2     WBC/Hgb:  Recent Labs   Lab Test 05/25/21  0752 05/07/21  1100 03/07/19  0658 03/07/19  0658 01/10/19  0838   WBC  --   --   --  6.2 9.3   HGB 13.0 14.3   < > 13.8 14.7    < > = values in this interval not displayed.     INR:  No results for input(s): INR in the last 13974 hours.  No components found for: [id number]    IMPORTANT PENDING TEST RESULTS: None    CONDITION AT DISCHARGE:    discharge condition: Stabilized    DISCHARGE ORDERS    Discharge Orders        Reason for your hospital stay    Left knee replacement     When to call - Contact Surgeon Team    You may experience symptoms that require follow-up before your scheduled appointment. Refer to the \"Stoplight Tool\" for instructions on when to contact your Surgeon Team if you are concerned about pain control, blood clots, constipation, or if you are unable to urinate.     When to call - Reach out to Urgent Care    If you are not able to reach your Surgeon Team and you need immediate care, go to the Orthopedic Walk-in Clinic or Urgent Care at your Surgeon's office.  Do NOT go to the " Emergency Room unless you have shortness of breath, chest pain, or other signs of a medical emergency.     When to call - Reasons to Call 911    Call 911 immediately if you experience sudden-onset chest pain, arm weakness/numbness, slurred speech, or shortness of breath     Discharge Instruction - Breathing exercises    Perform breathing exercises using your Incentive Spirometer 10 times per hour while awake for 2 weeks.     Symptoms - Fever Management    A low grade fever can be expected after surgery.  Use acetaminophen (TYLENOL) as needed for fever management.  Contact your Surgeon Team if you have a fever greater than 101.5 F, chills, and/or night sweats.     Symptoms - Constipation management    Constipation (hard, dry bowel movements) is expected after surgery due to the combination of being less active, the anesthetic, and the opioid pain medication.  You can do the following to help reduce constipation:  ~  FLUIDS:  Drink clear liquids (water or Gatorade), or juice (apple/prune).  ~  DIET:  Eat a fiber rich diet.    ~  ACTIVITY:  Get up and move around several times a day.  Increase your activity as you are able.  MEDICATIONS:  Reduce the risk of constipation by starting medications before you are constipated.  You can take Miralax   (1 packet as directed) and/or a stool softener (Senokot 1-2 tablets 1-2 times a day).  If you already have constipation and these medications are not working, you can get magnesium citrate and use as directed.  If you continue to have constipation you can try an over the counter suppository or enema.  Call your Surgeon Team if it has been greater than 3 days since your last bowel movement.     Symptoms - Reduced Urine Output    Changes in the amount of fluids you drank before and after surgery may result in problems urinating.  It is important to stay well-hydrated after surgery and drink plenty of water. If it has been greater than 8 hours since you have urinated despite  drinking plenty of water, call your Surgeon Team.     Activity - Exercises to prevent blood clots    Unless otherwise directed by your Surgeon team, perform the following exercises at least three times per day for the first four weeks after surgery to prevent blood clots in your legs: 1) Point and flex your feet (Ankle Pumps), 2) Move your ankle around in big circles, 3) Wiggle your toes, 4) Walk, even for short distances, several times a day, will help decrease the risk of blood clots.     Comfort and Pain Management - Pain after Surgery    Pain after surgery is normal and expected.  You will have some amount of pain for several weeks after surgery.  Your pain will improve with time.  There are several things you can do to help reduce your pain including: rest, ice, elevation, and using pain medications as needed. Contact your Surgeon Team if you have pain that persists or worsens after surgery despite rest, ice, elevation, and taking your medication(s) as prescribed. Contact your Surgeon Team if you have new numbness, tingling, or weakness in your operative extremity.     Comfort and Pain Management - Swelling after Surgery    Swelling and/or bruising of the surgical extremity is common and may persist for several months after surgery. In addition to frequent icing and elevation, gentle compressive support with an ACE wrap or tubigrip may help with swelling. Apply compression regularly, removing at least twice daily to perform skin checks. Contact your Surgeon Team if your swelling increases and is NOT associated with an increase in your activity level, or if your swelling increases and is associated with redness and pain.     Comfort and Pain Management - Cold therapy    Ice can be used to control swelling and discomfort after surgery. Place a thin towel over your operative site and apply the ice pack overtop. Leave ice pack in place for 20 minutes, then remove for 20 minutes. Repeat this 20 minutes on/20 minutes  off routine as often as tolerated.     Medication Instructions - Acetaminophen (TYLENOL) Instructions    You were discharged with acetaminophen (TYLENOL) for pain management after surgery. Acetaminophen most effectively manages pain symptoms when it is taken on a schedule without missing doses (every four, six, or eight hours). Your Provider will prescribe a safe daily dose between 3000 - 4000 mg.  Do NOT exceed this daily dose. Most patients use acetaminophen for pain control for the first four weeks after surgery.  You can wean from this medication as your pain decreases.     Medication Instructions - NSAID Instructions    You were discharged with an anti-inflammatory medication for pain management to use in combination with acetaminophen (TYLENOL) and the narcotic pain medication.  Take this medication exactly as directed.  You should only take one anti-inflammatory at a time.  Some common anti-inflammatories include: ibuprofen (ADVIL, MOTRIN), naproxen (ALEVE, NAPROSYN), celecoxib (CELEBREX), meloxicam (MOBIC), ketorolac (TORADOL).  Take this medication with food and water.     Medication Instructions - Opioids - Tapering Instructions    In the first three days following surgery, your symptoms may warrant use of the narcotic pain medication every four to six hours as prescribed. This is normal. As your pain symptoms improve, focus your efforts on decreasing (tapering) use of narcotic medications. The most successful tapering strategy is to first, decrease the number of tablets you take every 4-6 hours to the minimum prescribed. Then, increase the amount of time between doses.  For example:  First, taper to   or 1 tablet every 4-6 hours.  Then, taper to   or 1 tablet every 6-8 hours.  Then, taper to   or 1 tablet every 8-10 hours.  Then, taper to   or 1 tablet every 10-12 hours.  Then, taper to   or 1 tablet at bedtime.  The bedtime dose can help with comfort during sleep and is typically the last dose to be  "discontinued after surgery.     Follow Up Care    Follow-up with your Surgeon Team in 10-14 days for wound check.     Medication instructions - Anticoagulation - other    Take the Lovenox as prescribed for a total of  3 days after surgery then resume Eliquis  This is given to help minimize your risk of blood clot     Comfort and Pain Management - LOWER Extremity Elevation    Swelling is expected for several months after surgery. This type of swelling is usually associated with gravity and activity, and can be improved with elevation.   The best way to do this is to get your \"toes above your nose\" by laying down and placing several pillows lengthwise under your calf and heel. When elevating your leg keep your knee completely straight. Perform this elevation as often as possible especially for the first two weeks after surgery.     Medication Instructions - Opioid Instructions (Less than 65 years)    You were discharged with an opioid medication (hydromorphone, oxycodone, hydrocodone, or tramadol). This medication should only be taken for breakthrough pain that is not controlled with acetaminophen (TYLENOL). If you rate your pain less than 3 you do not need this medication.  Pain rating 0-3:  You do not need this medication.  Pain rating 4-6:  Take 1 tablet every 4-6 hours as needed  Pain rating 7-10:  Take 2 tablets every 4-6 hours as needed.  Do not exceed 6 tablets per day     Activity - Total Knee Arthroplasty     Return to Driving    Return to driving - Driving is NOT permitted until directed by your provider. Under no circumstance are you permitted to drive while using narcotic pain medications.     Dressing / Wound Care - Wound    You have a clean dressing on your surgical wound. Dressing change instructions as follows: dressing will be removed at your follow-up appointment. Contact your Surgeon Team if you have increased redness, warmth around the surgical wound, and/or drainage from the surgical wound. "     Dressing / Wound Care - NO Tub Bathing    Tub bathing, swimming, or any other activities that will cause your incision to be submerged in water should be avoided until allowed by your Surgeon.     NO Precautions    No precautions directed by your Provider.  You may perform range of motion activities as tolerated.     Weight bearing as tolerated    Weight bearing as tolerated on your operative extremity.     Dressing Wound Care - Shower with wound/dressing NOT covered    You do not need to cover your dressing or incision in the shower, you may allow water and soap to run over top of the surgical dressing or incision. You may shower 2 days after surgery.  You are strictly prohibited from soaking or submerging the surgical wound underwater.     Crutches DME    DME Documentation: Describe the reason for need to support medical necessity: Impaired gait status post knee surgery. I, the undersigned, certify that the above prescribed supplies are medically necessary for this patient and is both reasonable and necessary in reference to accepted standards of medical practice in the treatment of this patient's condition and is not prescribed as a convenience.     Cane DME    DME Documentation: Describe the reason for need to support medical necessity: Impaired gait status post knee surgery. I, the undersigned, certify that the above prescribed supplies are medically necessary for this patient and is both reasonable and necessary in reference to accepted standards of medical practice in the treatment of this patient's condition and is not prescribed as a convenience.     Walker DME    DME Documentation: Describe the reason for need to support medical necessity: Impaired gait status post knee surgery. I, the undersigned, certify that the above prescribed supplies are medically necessary for this patient and is both reasonable and necessary in reference to accepted standards of medical practice in the treatment of this  patient's condition and is not prescribed as a convenience.     Discharge Instruction - Regular Diet Adult    Return to your pre-surgery diet unless instructed otherwise       Discharge Medications     Current Discharge Medication List      START taking these medications    Details   enoxaparin ANTICOAGULANT (LOVENOX) 40 MG/0.4ML syringe Inject 0.4 mLs (40 mg) Subcutaneous every 24 hours for 2 days  Qty: 0.8 mL, Refills: 0    Associated Diagnoses: Status post total knee replacement, left      HYDROcodone-acetaminophen (NORCO) 5-325 MG tablet Take 1-2 tablets by mouth every 4 hours as needed for moderate to severe pain  Qty: 30 tablet, Refills: 0    Associated Diagnoses: Status post total knee replacement, left      polyethylene glycol (MIRALAX) 17 g packet Take 17 g by mouth daily  Qty: 7 packet, Refills: 0    Associated Diagnoses: Status post total knee replacement, left      senna-docusate (SENOKOT-S/PERICOLACE) 8.6-50 MG tablet Take 1-2 tablets by mouth 2 times daily Take while on oral narcotics to prevent or treat constipation.  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)         CONTINUE these medications which have NOT CHANGED    Details   atorvastatin (LIPITOR) 20 MG tablet Take 20 mg by mouth every evening      clobetasol (TEMOVATE) 0.05 % external ointment Apply topically 2 times daily as needed (hand  eczema)  Qty: 45 g, Refills: 0    Associated Diagnoses: Eczema, unspecified type      metoprolol succinate ER (TOPROL-XL) 25 MG 24 hr tablet Take 1 tablet (25 mg) by mouth daily  Qty: 90 tablet, Refills: 3    Associated Diagnoses: 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 Resume pre-admission diet  2. DVT prophylaxis: anticoagulants: Lovenox (LMWH) and Eliquis starting on Friday, May 28th  3. Follow up: She should see Dr. Berry in clinic in 1-2wks.  4. Sutures or staples will be removed by orthopedic surgeon at the 10-14 day follow-up appointment.  5. Weight Bearing weightbearing: WBAT (Weight bearing as tolerated).  6. Additional followup: None  7. Special instructions: None    Total time spent for discharge on date of discharge: 25 minutes    Physician(s) in addition to primary physician who should receive a copy:  Primary Care Physician Edgardo Mckeon  Orthopedic Medicine and Surgery -[phone]    Adriana Ferraro PA-C ....................  5/25/2021   10:43 AM

## 2021-05-25 NOTE — PROGRESS NOTES
Patient vital signs are at baseline: Yes  Patient able to ambulate as they were prior to admission or with assist devices provided by therapies during their stay:  Yes  Patient MUST void prior to discharge:  Yes  Patient able to tolerate oral intake:  Yes  Pain has adequate pain control using Oral analgesics:  Yes       A/Ox4. VSS on ra. PRN Norco for pain, IV dilaudid given x1. Voiding adequately.

## 2021-05-25 NOTE — PROGRESS NOTES
OT: Per PT, pt had recent R TKA and feels comfortable w/o OT services this hospitalization. Will complete IP OT order.

## 2021-05-25 NOTE — PROGRESS NOTES
"/84 (BP Location: Left arm)   Pulse 70   Temp 98.1  F (36.7  C) (Oral)   Resp 17   Ht 1.638 m (5' 4.5\")   Wt 87.5 kg (192 lb 12.8 oz)   LMP 04/25/2011   SpO2 98%   BMI 32.58 kg/m      Pt on Home CPAP overnight.  RORY BURNHAM r, RT  "

## 2021-06-09 ENCOUNTER — TRANSFERRED RECORDS (OUTPATIENT)
Dept: HEALTH INFORMATION MANAGEMENT | Facility: CLINIC | Age: 56
End: 2021-06-09

## 2021-06-21 NOTE — TELEPHONE ENCOUNTER
Hi Dr. Mckeon,  Patient is lost to pap tracking follow-up. Attempts to contact pt have been made per reminder process and there has been no reply and/or no appt scheduled.       Pap Hx:  3/11/11 LSIL, plan [place]. No record of [place] completed  11/6/20 ASCUS, +HR HPV, not 16/18. Plan [place] bef 2/6/21 11/16/20 Pt notified  01/6/21 mychart reminder, pt viewed.   02/3/21 [place] not done. Tracking updated for 6 mo colp/pap due bef 05/6/21.   5/19/21 Reminder my chart, pt viewed  06/19/21 Lost to follow-up for pap tracking, kenji routed to provider

## 2021-07-07 ENCOUNTER — TRANSFERRED RECORDS (OUTPATIENT)
Dept: HEALTH INFORMATION MANAGEMENT | Facility: CLINIC | Age: 56
End: 2021-07-07

## 2021-10-09 DIAGNOSIS — Q24.5 ALCAPA (ANOMALOUS LEFT CORONARY ARTERY FROM THE PULMONARY ARTERY): ICD-10-CM

## 2021-10-23 ENCOUNTER — HEALTH MAINTENANCE LETTER (OUTPATIENT)
Age: 56
End: 2021-10-23

## 2021-12-09 ENCOUNTER — HOSPITAL ENCOUNTER (OUTPATIENT)
Dept: CARDIOLOGY | Facility: CLINIC | Age: 56
Discharge: HOME | End: 2021-12-09
Attending: INTERNAL MEDICINE | Admitting: INTERNAL MEDICINE
Payer: COMMERCIAL

## 2021-12-09 ENCOUNTER — OFFICE VISIT (OUTPATIENT)
Dept: CARDIOLOGY | Facility: CLINIC | Age: 56
End: 2021-12-09
Payer: COMMERCIAL

## 2021-12-09 VITALS
SYSTOLIC BLOOD PRESSURE: 123 MMHG | WEIGHT: 189 LBS | OXYGEN SATURATION: 98 % | HEIGHT: 65 IN | DIASTOLIC BLOOD PRESSURE: 77 MMHG | BODY MASS INDEX: 31.49 KG/M2 | HEART RATE: 74 BPM

## 2021-12-09 DIAGNOSIS — Q24.5 ALCAPA (ANOMALOUS LEFT CORONARY ARTERY FROM THE PULMONARY ARTERY): ICD-10-CM

## 2021-12-09 DIAGNOSIS — R00.2 PALPITATIONS: Primary | ICD-10-CM

## 2021-12-09 DIAGNOSIS — Q24.5 ALCAPA (ANOMALOUS LEFT CORONARY ARTERY FROM THE PULMONARY ARTERY): Primary | ICD-10-CM

## 2021-12-09 LAB — LVEF ECHO: NORMAL

## 2021-12-09 PROCEDURE — 93306 TTE W/DOPPLER COMPLETE: CPT | Mod: 26 | Performed by: INTERNAL MEDICINE

## 2021-12-09 PROCEDURE — 255N000002 HC RX 255 OP 636: Performed by: INTERNAL MEDICINE

## 2021-12-09 PROCEDURE — 99215 OFFICE O/P EST HI 40 MIN: CPT | Mod: 25 | Performed by: INTERNAL MEDICINE

## 2021-12-09 PROCEDURE — 999N000208 ECHOCARDIOGRAM COMPLETE

## 2021-12-09 RX ORDER — METOPROLOL SUCCINATE 25 MG/1
25 TABLET, EXTENDED RELEASE ORAL DAILY
Qty: 90 TABLET | Refills: 3 | Status: SHIPPED | OUTPATIENT
Start: 2021-12-09 | End: 2022-12-05

## 2021-12-09 RX ORDER — NAPROXEN SODIUM 220 MG
220 TABLET ORAL DAILY
COMMUNITY

## 2021-12-09 RX ADMIN — HUMAN ALBUMIN MICROSPHERES AND PERFLUTREN 3 ML: 10; .22 INJECTION, SOLUTION INTRAVENOUS at 10:50

## 2021-12-09 ASSESSMENT — MIFFLIN-ST. JEOR: SCORE: 1430.24

## 2021-12-09 NOTE — PROGRESS NOTES
CARDIOLOGY CONSULTATION:    Ms. Fofana is a delightful, 56-year-old woman with a history of ALCAPA; please see my previous note from January 2019 for details.  I ended up cathing her given she had some chest pressure with exertion, and she had a positive nuclear stress test in January 2019.  The cath showed that she had a ligated left main, and then they plugged a LIMA into the mid LAD.  She has an aneurysm at the implantation site of the LIMA in her LAD, and she has collaterals from her massive right coronary artery.  Her coronary artery is between 9-11 mm, so quite enlarged, but there is nothing that would need intervention or a surgery could repair.  She is on anticoagulation now, and her insurance is no longer covering her Eliquis so will need to switch to Xarelto.        Since I last saw her she has been feeling well.  She has had bother her knees replaced and that has been life-changing.  She is walking extensively and feels great.  She is vaccinated. Working from home. Echo is stable with EF around 40% and apical anterior akinesis that is unchanged.     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 Wilson for additional refills. 180 tablet 1     atorvastatin (LIPITOR) 20 MG tablet Take 20 mg  by mouth every evening       clobetasol (TEMOVATE) 0.05 % external ointment Apply topically 2 times daily as needed (hand eczema) 45 g 0     metoprolol succinate ER (TOPROL-XL) 25 MG 24 hr tablet Take 1 tablet (25 mg) by mouth daily 90 tablet 3     naproxen sodium (ANAPROX) 220 MG tablet Take 220 mg by mouth daily       Psyllium (METAMUCIL PO) Take 1 tablet by mouth every evening       Simethicone (GAS-X PO) Take 1 tablet by mouth daily as needed for intestinal gas         PAST SURGICAL HISTORY:  Past Surgical History:   Procedure Laterality Date     ARTHROPLASTY KNEE Left 2021    Procedure: left total knee arthroplasty;  Surgeon: Anseth, Scott Duane, MD;  Location:  OR     CARDIAC SURGERY      CABG     CV RIGHT HEART CATH MEASUREMENTS RECORDED N/A 3/7/2019    Procedure: RHC with Coronay Angiogram;  Surgeon: Shikha Wilson MD;  Location:  HEART CARDIAC CATH LAB     ORTHOPEDIC SURGERY      right total knee     ZZC NONSPECIFIC PROCEDURE  1997    left coronary artery revision       ALLERGIES  Aspirin and Ibuprofen    FAMILY HX:  Family History   Problem Relation Age of Onset     Heart Disease Maternal Grandfather         MI     Cancer - colorectal Paternal Grandfather      Diabetes Maternal Uncle      Thyroid Disease Sister         hypothyroidism     Neurologic Disorder Sister         MS diagnosed at 40y.o.       SOCIAL HX:  Social History     Socioeconomic History     Marital status:      Spouse name: Adolfo     Number of children: 2     Years of education: 14 years     Highest education level: None   Occupational History     Occupation: management     Employer: [employer]     Comment: [employer]   Tobacco Use     Smoking status: Former Smoker     Packs/day: 1.50     Years: 10.00     Pack years: 15.00     Types: Cigarettes     Quit date: 1991     Years since quittin.4     Smokeless tobacco: Never Used   Substance and Sexual Activity     Alcohol use: Yes      "Alcohol/week: 0.0 standard drinks     Comment: socially     Drug use: No     Sexual activity: Yes     Partners: Male   Other Topics Concern      Service No     Blood Transfusions No     Caffeine Concern No     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 Not Asked   Social History Narrative     None     Social Determinants of Health     Financial Resource Strain: Not on file   Food Insecurity: Not on file   Transportation Needs: Not on file   Physical Activity: Not on file   Stress: Not on file   Social Connections: Not on file   Intimate Partner Violence: Not on file   Housing Stability: Not on file       ROS:  Constitutional: No fever, chills, or sweats. No weight gain/loss.   ENT: No visual disturbance, ear ache, epistaxis, sore throat.   Allergies/Immunologic: Negative.   Respiratory: No cough, hemoptysis.   Cardiovascular: As per HPI.   GI: No nausea, vomiting, hematemesis, melena, or hematochezia.   : No urinary frequency, dysuria, or hematuria.   Integument: Negative.   Psychiatric: Negative.   Neuro: Negative.   Endocrinology: Negative.   Musculoskeletal: No myalgia.    VITAL SIGNS:  /77 (BP Location: Right arm, Cuff Size: Adult Regular)   Pulse 74   Ht 1.638 m (5' 4.5\")   Wt 85.7 kg (189 lb)   LMP 04/25/2011   SpO2 98%   BMI 31.94 kg/m    Body mass index is 31.94 kg/m .  Wt Readings from Last 2 Encounters:   12/09/21 85.7 kg (189 lb)   05/24/21 87.5 kg (192 lb 12.8 oz)       PHYSICAL EXAM  Jesenia Fofana IS A 56 year old female.in no acute distress.  HEENT: Unremarkable.  Neck: JVP normal.   Lungs: CTA.  Cor: RRR. Normal S1 and S2.  No murmur, rub, or " aidan.  PMI in Lf 5th ICS. Extremities: No C/C/E. Neuro: Grossly intact.    LABS    Lab Results   Component Value Date    WBC 6.2 03/07/2019     Lab Results   Component Value Date    RBC 4.67 03/07/2019     Lab Results   Component Value Date    HGB 13.0 05/25/2021     Lab Results   Component Value Date    HCT 40.5 03/07/2019     No components found for: MCT  Lab Results   Component Value Date    MCV 87 03/07/2019     Lab Results   Component Value Date    MCH 29.6 03/07/2019     Lab Results   Component Value Date    MCHC 34.1 03/07/2019     Lab Results   Component Value Date    RDW 12.1 03/07/2019     Lab Results   Component Value Date     05/25/2021      Recent Labs   Lab Test 05/25/21  0752 05/24/21  1251 05/07/21  1100 11/06/20  0942 05/26/20  1428   NA  --   --  141  --  140   POTASSIUM  --   --  3.9  --  4.2   CHLORIDE  --   --  107  --  106   CO2  --   --  27  --  29   ANIONGAP  --   --  7  --  5   *  --  81   < > 79   BUN  --   --  9  --  10   CR  --  0.62 0.72  --  0.55   JOSEPHINE  --   --  8.8  --  8.7    < > = values in this interval not displayed.     Recent Labs   Lab Test 09/10/20  0910 02/14/19  0940   CHOL 146 153   HDL 49* 48*   LDL 72 74   TRIG 125 155*   NHDL 97 105        IMPRESSION, REPORT AND PLAN:   1.  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 Norris with repair, including ligation of the left main and a LIMA to the LAD.   2. Ejection fraction of 40%-45%.   3.  Coronary artery aneurysm involving the mid LAD and the right coronary artery on anticoagulation.   4.  History of thyrotoxicosis.   5. S/P bilateral knee replacement     DISCUSSION:  It was a pleasure to see Ms. Fofana in followup.  Clinically, she is doing well.  I am so glad to hear how much better she feels with her knee replacements.  She is tolerating anticoagulation with the CAD aneurysms, and her severe apical wall motion abnormality; will switch her to xarelto  for insurance reasons.     We will plan to see her back in a year or sooner if there are any issues in the interim.  She understands and is in agreement with the plan as outlined.       It was a pleasure to see her.  Please do not hesitate to contact me with any questions or concerns.         MARCIAL CRAIG MD   47 minutes face-face, documentation and review of records on day of visit

## 2021-12-09 NOTE — LETTER
12/9/2021    Edgardo Mckeon MD  [address]  [place] [address]    RE: Jesenia Fofana       Dear Colleague,     I had the pleasure of seeing Jesenia Fofana in the [facility].  CARDIOLOGY CONSULTATION:    Ms. Fofana is a delightful, 56-year-old woman with a history of ALCAPA; please see my previous note from January 2019 for details.  I ended up cathing her given she had some chest pressure with exertion, and she had a positive nuclear stress test in January 2019.  The cath showed that she had a ligated left main, and then they plugged a LIMA into the mid LAD.  She has an aneurysm at the implantation site of the LIMA in her LAD, and she has collaterals from her massive right coronary artery.  Her coronary artery is between 9-11 mm, so quite enlarged, but there is nothing that would need intervention or a surgery could repair.  She is on anticoagulation now, and her insurance is no longer covering her Eliquis so will need to switch to Xarelto.        Since I last saw her she has been feeling well.  She has had bother her knees replaced and that has been life-changing.  She is walking extensively and feels great.  She is vaccinated. Working from home. Echo is stable with EF around 40% and apical anterior akinesis that is unchanged.     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 Wilson for additional refills. 180 tablet 1     atorvastatin (LIPITOR) 20 MG tablet Take 20 mg by mouth every evening       clobetasol (TEMOVATE) 0.05 % external ointment Apply topically 2 times daily as needed (hand eczema) 45 g 0     metoprolol succinate ER (TOPROL-XL) 25 MG 24 hr tablet Take 1 tablet (25 mg) by mouth daily 90 tablet 3     naproxen sodium (ANAPROX) 220 MG tablet Take 220 mg by mouth daily       Psyllium (METAMUCIL PO) Take 1 tablet by mouth every evening       Simethicone (GAS-X PO) Take 1 tablet by mouth daily as needed for intestinal gas         PAST SURGICAL HISTORY:  Past Surgical History:   Procedure Laterality Date     ARTHROPLASTY KNEE Left 5/24/2021    Procedure: left total knee arthroplasty;  Surgeon: Anseth, Scott Duane, MD;  Location:  OR     CARDIAC SURGERY      CABG     CV RIGHT HEART CATH MEASUREMENTS RECORDED N/A 3/7/2019    Procedure: RHC with Coronay Angiogram;  Surgeon: Shikha Wilson MD;  Location:  HEART CARDIAC CATH LAB     ORTHOPEDIC SURGERY      right total knee     ZZC NONSPECIFIC PROCEDURE  1997    left coronary artery revision       ALLERGIES  Aspirin and Ibuprofen    FAMILY HX:  Family History   Problem Relation Age of Onset     Heart Disease Maternal Grandfather         MI     Cancer - colorectal Paternal Grandfather      Diabetes Maternal Uncle      Thyroid Disease Sister         hypothyroidism     Neurologic Disorder Sister         MS diagnosed at 40y.o.       SOCIAL HX:  Social History     Socioeconomic History     Marital status:      Spouse name: Adolfo     Number of children: 2     Years of education: 14 years     Highest education level: None   Occupational History     Occupation: management     Employer: [employer]     Comment: [employer]   Tobacco Use     Smoking status: Former Smoker     Packs/day: 1.50  "    Years: 10.00     Pack years: 15.00     Types: Cigarettes     Quit date: 1991     Years since quittin.4     Smokeless tobacco: Never Used   Substance and Sexual Activity     Alcohol use: Yes     Alcohol/week: 0.0 standard drinks     Comment: socially     Drug use: No     Sexual activity: Yes     Partners: Male   Other Topics Concern      Service No     Blood Transfusions No     Caffeine Concern No     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 Not Asked   Social History Narrative     None     Social Determinants of Health     Financial Resource Strain: Not on file   Food Insecurity: Not on file   Transportation Needs: Not on file   Physical Activity: Not on file   Stress: Not on file   Social Connections: Not on file   Intimate Partner Violence: Not on file   Housing Stability: Not on file       ROS:  Constitutional: No fever, chills, or sweats. No weight gain/loss.   ENT: No visual disturbance, ear ache, epistaxis, sore throat.   Allergies/Immunologic: Negative.   Respiratory: No cough, hemoptysis.   Cardiovascular: As per HPI.   GI: No nausea, vomiting, hematemesis, melena, or hematochezia.   : No urinary frequency, dysuria, or hematuria.   Integument: Negative.   Psychiatric: Negative.   Neuro: Negative.   Endocrinology: Negative.   Musculoskeletal: No myalgia.    VITAL SIGNS:  /77 (BP Location: Right arm, Cuff Size: Adult Regular)   Pulse 74   Ht 1.638 m (5' 4.5\")   Wt 85.7 kg (189 lb)   LMP 2011   SpO2 98%   BMI 31.94 kg/m    Body mass index is 31.94 kg/m .  Wt Readings from Last 2 Encounters:   21 85.7 kg (189 lb)   21 87.5 kg " (192 lb 12.8 oz)       PHYSICAL EXAM  Jesenia Fofana IS A 56 year old female.in no acute distress.  HEENT: Unremarkable.  Neck: JVP normal.   Lungs: CTA.  Cor: RRR. Normal S1 and S2.  No murmur, rub, or gallop.  PMI in Lf 5th ICS. Extremities: No C/C/E. Neuro: Grossly intact.    LABS    Lab Results   Component Value Date    WBC 6.2 03/07/2019     Lab Results   Component Value Date    RBC 4.67 03/07/2019     Lab Results   Component Value Date    HGB 13.0 05/25/2021     Lab Results   Component Value Date    HCT 40.5 03/07/2019     No components found for: MCT  Lab Results   Component Value Date    MCV 87 03/07/2019     Lab Results   Component Value Date    MCH 29.6 03/07/2019     Lab Results   Component Value Date    MCHC 34.1 03/07/2019     Lab Results   Component Value Date    RDW 12.1 03/07/2019     Lab Results   Component Value Date     05/25/2021      Recent Labs   Lab Test 05/25/21  0752 05/24/21  1251 05/07/21  1100 11/06/20  0942 05/26/20  1428   NA  --   --  141  --  140   POTASSIUM  --   --  3.9  --  4.2   CHLORIDE  --   --  107  --  106   CO2  --   --  27  --  29   ANIONGAP  --   --  7  --  5   *  --  81   < > 79   BUN  --   --  9  --  10   CR  --  0.62 0.72  --  0.55   JOSEPHINE  --   --  8.8  --  8.7    < > = values in this interval not displayed.     Recent Labs   Lab Test 09/10/20  0910 02/14/19  0940   CHOL 146 153   HDL 49* 48*   LDL 72 74   TRIG 125 155*   NHDL 97 105        IMPRESSION, REPORT AND PLAN:   1.  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 Norris with repair, including ligation of the left main and a LIMA to the LAD.   2. Ejection fraction of 40%-45%.   3.  Coronary artery aneurysm involving the mid LAD and the right coronary artery on anticoagulation.   4.  History of thyrotoxicosis.   5. S/P bilateral knee replacement     DISCUSSION:  It was a pleasure to see Ms. Fofana in followup.  Clinically, she is doing well.  I am  so glad to hear how much better she feels with her knee replacements.  She is tolerating anticoagulation with the CAD aneurysms, and her severe apical wall motion abnormality; will switch her to xarelto for insurance reasons.     We will plan to see her back in a year or sooner if there are any issues in the interim.  She understands and is in agreement with the plan as outlined.       It was a pleasure to see her.  Please do not hesitate to contact me with any questions or concerns.         MARCIAL CRAIG MD   47 minutes face-face, documentation and review of records on day of visit

## 2021-12-09 NOTE — PATIENT INSTRUCTIONS
You were seen today in the Adult Congenital and Cardiovascular Genetics Clinic at the [facility].    Cardiology Providers you saw during your visit:  ASIA Wilson MD    Diagnosis:  Anomalous Coronary Artery s/p repair    Results:  ASIA Wilson MD reviewed the results of your echo testing today in clinic.    Recommendations:    1. Continue to eat a heart healthy, low salt diet.  2. Continue to get 20-30 minutes of aerobic activity, 4-5 days per week.  Examples of aerobic activity include walking, running, swimming, cycling, etc.  3. Continue to observe good oral hygiene, with regular dental visits.  4. Stop taking Eliquis. Start taking Xarelto.  5. Your metoprolol prescription was refilled.  6. Please wear a Zio patch monitor. We will call you with the results.    SBE prophylaxis:   Yes____  No_X___    Lifelong Bacterial Endocarditis Prophylaxis:  YES____  NO_X___    If YES is checked, follow the recommendations outlined below:  1. Take antibiotic(s) prior to recommended dental procedures and procedures on the respiratory tract or with infected skin, muscle or bones. SBE prophylaxis is not needed for routine GI and  procedures (ie. Colonoscopy or vaginal delivery)  2. Observe good oral hygiene daily, as advised by your dentist. Get regular professional dental care.  3. Keep cuts clean.  4. Infections should be treated promptly.  5. Symptoms of Infective Endocarditis could include: fever lasting more than 4-5 days or a recurrent fever that initially resolves but returns within 1-2 days)      Exercise restrictions:   Yes__X__  No____         If yes, list restrictions:  Must be allowed to rest if fatigued or SOB      Work restrictions:  Yes____  No_X___         If yes, list restrictions:    FASTING CHOLESTEROL was checked in the last 5 years YES_X__  NO___ (2020)  Continue to eat a heart healthy, low salt diet.         ____ Fasting lipid panel order today         ____ No changes in medications           ____ I recommend the following changes in your cholesterol medications.:          ____ Please follow up for cholesterol screening at your primary care physician      Follow-up:  Follow up with Dr. Wilson in one year with echo and labs prior.     If you have questions or concerns please contact us at:    Gracie Wilson MSN, RN, CNL  Jessica Parra (Scheduling)  Nurse Care Coordinator     Clinic   Adult Congenital and CV Genetics   Adult Congenital and CV Genetic  [facility]   [facility]  (P) [phone]     (P) [phone]         (F) [phone]        For after hours urgent needs, call [phone] and ask to speak to the Adult Congenital Physician on call.  Mention Job Code 0401.    For emergencies call 911.    [facility]  [facility]   [facility]  Mail Code [id number]  [address] [place]  [address]

## 2021-12-09 NOTE — NURSING NOTE
Cardiac Monitors: Patient was instructed regarding the indication, function, care and prompt return of a Zio patch holter  monitor. Patient demonstrated understanding of this information and agreed to call with further questions or concerns.    Cardiac Testing: Patient given instructions regarding  echocardiogram . Discussed purpose, preparation, procedure and when to expect results reported back to the patient. Patient demonstrated understanding of this information and agreed to call with further questions or concerns.    Diet: Patient instructed regarding a heart healthy diet, including discussion of reduced fat and sodium intake. Patient demonstrated understanding of this information and agreed to call with further questions or concerns.    Labs: Patient was given results of the laboratory testing obtained today. Patient was instructed to return for the next laboratory testing in one year. Patient demonstrated understanding of this information and agreed to call with further questions or concerns.     Med Reconcile: Reviewed and verified all current medications with the patient. The updated medication list was printed and given to the patient.    Return Appointment: Patient given instructions regarding scheduling next clinic visit. Patient demonstrated understanding of this information and agreed to call with further questions or concerns.    Medication Change: Switch from Eliquis to Xarelto. Patient was educated regarding prescribed medication change, including discussion of the indication, administration, side effects, and when to report to MD or RN. Patient demonstrated understanding of this information and agreed to call with further questions or concerns.    Patient stated she understood all health information given and agreed to call with further questions or concerns.    Gracie Wilson, MSN, RN, CNL  Cardiology Care Coordinator  [facility]  [phone]

## 2021-12-10 ENCOUNTER — HOSPITAL ENCOUNTER (OUTPATIENT)
Dept: CARDIOLOGY | Facility: CLINIC | Age: 56
Discharge: HOME | End: 2021-12-10
Attending: INTERNAL MEDICINE | Admitting: INTERNAL MEDICINE
Payer: COMMERCIAL

## 2021-12-10 DIAGNOSIS — R00.2 PALPITATIONS: ICD-10-CM

## 2021-12-10 PROCEDURE — 93246 EXT ECG>7D<15D RECORDING: CPT

## 2021-12-10 PROCEDURE — 93248 EXT ECG>7D<15D REV&INTERPJ: CPT | Performed by: INTERNAL MEDICINE

## 2021-12-16 DIAGNOSIS — Q24.5 ALCAPA (ANOMALOUS LEFT CORONARY ARTERY FROM THE PULMONARY ARTERY): ICD-10-CM

## 2021-12-16 DIAGNOSIS — R94.39 ABNORMAL CARDIOVASCULAR STRESS TEST: ICD-10-CM

## 2021-12-16 DIAGNOSIS — E78.5 HYPERLIPIDEMIA LDL GOAL <100: Primary | ICD-10-CM

## 2021-12-16 RX ORDER — ATORVASTATIN CALCIUM 20 MG/1
20 TABLET, FILM COATED ORAL EVERY EVENING
Qty: 90 TABLET | Refills: 3 | Status: SHIPPED | OUTPATIENT
Start: 2021-12-16 | End: 2023-01-10

## 2021-12-18 ENCOUNTER — HEALTH MAINTENANCE LETTER (OUTPATIENT)
Age: 56
End: 2021-12-18

## 2022-02-22 ENCOUNTER — TELEPHONE (OUTPATIENT)
Dept: FAMILY MEDICINE | Facility: CLINIC | Age: 57
End: 2022-02-22
Payer: COMMERCIAL

## 2022-02-22 NOTE — TELEPHONE ENCOUNTER
Pt and [facility] calling requesting to talk to medical referrals. The pt had a claim on January 5th 2022 that was denied because no referral on file. Provider had sent claim out of state to local [facility], [employer] in [facility].     NPI #[id number]  Can either call pt back at [phone] or provider services for [facility]: [phone]    Routing to medical referral contact for further assistance.     Telma FALLON RN  [facility]

## 2022-02-23 NOTE — TELEPHONE ENCOUNTER
Tried calling [employer] on hold for to long will try tomorrow. Second Dr. Mckeon did not give patient a referral for this. I do not see one in the chart. Third we do not do retro referrals.     Cara LIMON-referral rep

## 2022-02-24 NOTE — TELEPHONE ENCOUNTER
Called [employer] about the referral they were asking for. The claim was processed wrong. No referral is needed. I will contact patient. Thanks    Cara LIMON-deshaun rep

## 2022-03-08 ENCOUNTER — MYC REFILL (OUTPATIENT)
Dept: FAMILY MEDICINE | Facility: CLINIC | Age: 57
End: 2022-03-08
Payer: COMMERCIAL

## 2022-03-08 DIAGNOSIS — L30.9 ECZEMA, UNSPECIFIED TYPE: ICD-10-CM

## 2022-03-09 NOTE — TELEPHONE ENCOUNTER
Routing refill request to provider for review/approval because:  Drug not on the [facility] refill protocol   Telma FALLON RN  [facility]

## 2022-03-10 RX ORDER — CLOBETASOL PROPIONATE 0.5 MG/G
OINTMENT TOPICAL 2 TIMES DAILY PRN
Qty: 45 G | Refills: 0 | Status: SHIPPED | OUTPATIENT
Start: 2022-03-10 | End: 2024-05-28

## 2022-10-09 ENCOUNTER — HEALTH MAINTENANCE LETTER (OUTPATIENT)
Age: 57
End: 2022-10-09

## 2022-10-28 DIAGNOSIS — G47.33 OSA ON CPAP: ICD-10-CM

## 2022-10-28 DIAGNOSIS — G47.33 OBSTRUCTIVE SLEEP APNEA (ADULT) (PEDIATRIC): Primary | ICD-10-CM

## 2022-11-11 ENCOUNTER — TELEPHONE (OUTPATIENT)
Dept: CARDIOLOGY | Facility: CLINIC | Age: 57
End: 2022-11-11

## 2022-11-11 NOTE — TELEPHONE ENCOUNTER
Health Call Center    Phone Message    May a detailed message be left on voicemail: yes     Reason for Call: Other: Please extend the dates of the Echocardiogram orders into 2023 patient would like to come in January 2023. Please return call to advise.      Action Taken: Other: Cardiology    Travel Screening: Not Applicable     Thank you!  [facility]

## 2022-11-14 NOTE — TELEPHONE ENCOUNTER
1st attempt to reach out to pt to schedule follow up with Linda with Echo and labs prior.No answer, LVM

## 2022-11-15 ENCOUNTER — ANCILLARY PROCEDURE (OUTPATIENT)
Dept: GENERAL RADIOLOGY | Facility: CLINIC | Age: 57
End: 2022-11-15
Attending: PHYSICIAN ASSISTANT
Payer: COMMERCIAL

## 2022-11-15 ENCOUNTER — OFFICE VISIT (OUTPATIENT)
Dept: FAMILY MEDICINE | Facility: CLINIC | Age: 57
End: 2022-11-15
Payer: COMMERCIAL

## 2022-11-15 VITALS
SYSTOLIC BLOOD PRESSURE: 115 MMHG | RESPIRATION RATE: 20 BRPM | TEMPERATURE: 96.9 F | HEART RATE: 67 BPM | DIASTOLIC BLOOD PRESSURE: 75 MMHG | BODY MASS INDEX: 31.99 KG/M2 | WEIGHT: 192 LBS | HEIGHT: 65 IN | OXYGEN SATURATION: 96 %

## 2022-11-15 DIAGNOSIS — S80.12XA LEG HEMATOMA, LEFT, INITIAL ENCOUNTER: Primary | ICD-10-CM

## 2022-11-15 DIAGNOSIS — S80.12XA LEG HEMATOMA, LEFT, INITIAL ENCOUNTER: ICD-10-CM

## 2022-11-15 PROCEDURE — 73590 X-RAY EXAM OF LOWER LEG: CPT | Mod: TC | Performed by: INTERNAL MEDICINE

## 2022-11-15 PROCEDURE — 99214 OFFICE O/P EST MOD 30 MIN: CPT | Performed by: PHYSICIAN ASSISTANT

## 2022-11-15 ASSESSMENT — PAIN SCALES - GENERAL: PAINLEVEL: MODERATE PAIN (4)

## 2022-11-15 NOTE — LETTER
[address], [address]  [facility] [address]  Phone: [phone]    November 15, 2022        Jesenia Fofana  [address] [place] [id number]          To whom it may concern:    RE: Jesenia Fofana    Patient was seen and treated today at our clinic.  I have advised patient to work from home for the next 2 weeks so that she can elevate her leg more easily than if she was at work.  She has a leg injury that is not improving likely related to being in a seated position all day when she is at work compared to at home.      Please contact me for questions or concerns.      Sincerely,        Keily Harrington PA-C

## 2022-11-15 NOTE — PROGRESS NOTES
Maria E Ba is a 57 year old, presenting for the following health issues:  Leg Pain (Left )      History of Present Illness       Reason for visit:  On Oct 30 I hit my shin & it caused a bump & bruising 1st week I only 1 bump that has now grown, by days end my calf & ankle swell. When I first injured myself my leg did not swell, I am on Xarelto & expected bruising but I am concerned this isn't normal  Symptom onset:  1-2 weeks ago  Symptoms include:  Pain, swelling and bruising  Symptom intensity:  Moderate  Symptom progression:  Worsening  Had these symptoms before:  No  What makes it worse:  Being on my feet or sitting with my feet lower    She eats 2-3 servings of fruits and vegetables daily.She consumes 0 sweetened beverage(s) daily.She exercises with enough effort to increase her heart rate 9 or less minutes per day.  She exercises with enough effort to increase her heart rate 3 or less days per week.   She is taking medications regularly.       Starting last Thursday her L foot has been swelling ivy in the afternoon  She hit her shin with a leaf blower on 10/30/22  She is on xarelto  Now having more redness and the bump is getting larger and now the calf is tight in the past 4 days.    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    abnl left coronary artery defect     Sleep apnea     cpap     Past Surgical History:   Procedure Laterality Date     ARTHROPLASTY KNEE Left 5/24/2021    Procedure: left total knee arthroplasty;  Surgeon: Anseth, Scott Duane, MD;  Location:  OR     CARDIAC SURGERY      CABG     CV RIGHT HEART CATH MEASUREMENTS RECORDED N/A 3/7/2019    Procedure: RHC with Coronay Angiogram;  Surgeon: Shikha Wilson MD;  Location:  HEART CARDIAC CATH LAB     ORTHOPEDIC SURGERY      right total knee     ZZC NONSPECIFIC  "PROCEDURE  1997    left coronary artery revision     Social History     Tobacco Use     Smoking status: Former     Packs/day: 1.50     Years: 10.00     Pack years: 15.00     Types: Cigarettes     Quit date: 1991     Years since quittin.3     Smokeless tobacco: Never   Substance Use Topics     Alcohol use: Yes     Alcohol/week: 0.0 standard drinks     Comment: socially     Current Outpatient Medications   Medication Sig Dispense Refill     acetaminophen (TYLENOL) 325 MG tablet Take 2 tablets (650 mg) by mouth every 4 hours as needed for other (mild pain) 100 tablet 0     amoxicillin (AMOXIL) 500 MG tablet Take 2,000 mg by mouth daily as needed (1 HOUR PRIOR TO DENTAL VISITS)       atorvastatin (LIPITOR) 20 MG tablet Take 1 tablet (20 mg) by mouth every evening 90 tablet 3     clobetasol (TEMOVATE) 0.05 % external ointment Apply topically 2 times daily as needed (hand eczema) 45 g 0     metoprolol succinate ER (TOPROL-XL) 25 MG 24 hr tablet Take 1 tablet (25 mg) by mouth daily 90 tablet 3     naproxen sodium (ANAPROX) 220 MG tablet Take 220 mg by mouth daily       Psyllium (METAMUCIL PO) Take 1 tablet by mouth every evening       rivaroxaban ANTICOAGULANT (XARELTO ANTICOAGULANT) 20 MG TABS tablet Take 1 tablet (20 mg) by mouth daily (with dinner) 90 tablet 3     Simethicone (GAS-X PO) Take 1 tablet by mouth daily as needed for intestinal gas       Allergies   Allergen Reactions     Aspirin Hives     Ibuprofen Hives     FAMILY HISTORY NOTED AND REVIEWED    PHYSICAL EXAM:    /75 (BP Location: Left arm, Patient Position: Sitting, Cuff Size: Adult Large)   Pulse 67   Temp 96.9  F (36.1  C) (Temporal)   Resp 20   Ht 1.638 m (5' 4.5\")   Wt 87.1 kg (192 lb)   LMP 2011   SpO2 96%   BMI 32.45 kg/m      Patient appears non toxic  L distal ant davis there is a golf ball sized hematoma with eccymoses into the foot  No palp cords. L calf a bit more tight than the R.    Results for orders placed or " performed in visit on 11/15/22   XR Tibia and Fibula Left 2 Views     Status: None    Narrative    XR TIBIA AND FIBULA LEFT 2 VIEWS 11/15/2022 4:19 PM    HISTORY: Leg hematoma, left, initial encounter    COMPARISON: None.      Impression    IMPRESSION: No fracture. Left total knee arthroplasty. Mild soft  tissue swelling along the anterior margin of the distal tibial  diaphysis.    LEONARDA VIEIRA MD         SYSTEM ID:  [id number]         Assessment and Plan:     (S80.12XA) Leg hematoma, left, initial encounter  (primary encounter diagnosis)  Comment: stop aleve since on xarelto. Discussed tylenol a safer option. Leg elevation discussed.  Plan: XR Tibia and Fibula Left 2 Views        Note given for pt to work just from home x 2 weeks as easier to elevate leg during the work day from home. Her sxs worse when in the office.        Keily Harrington PA-C

## 2022-12-03 DIAGNOSIS — Q24.5 ALCAPA (ANOMALOUS LEFT CORONARY ARTERY FROM THE PULMONARY ARTERY): ICD-10-CM

## 2022-12-05 RX ORDER — METOPROLOL SUCCINATE 25 MG/1
25 TABLET, EXTENDED RELEASE ORAL DAILY
Qty: 90 TABLET | Refills: 0 | Status: SHIPPED | OUTPATIENT
Start: 2022-12-05 | End: 2023-01-10

## 2022-12-05 NOTE — TELEPHONE ENCOUNTER
[facility] Refill Guideline reviewed. Refill meets criteria. Patient is due for follow-up. 90 days supply sent to pharmacy and refill letter sent.

## 2023-01-10 DIAGNOSIS — R94.39 ABNORMAL CARDIOVASCULAR STRESS TEST: ICD-10-CM

## 2023-01-10 DIAGNOSIS — Q24.5 ALCAPA (ANOMALOUS LEFT CORONARY ARTERY FROM THE PULMONARY ARTERY): ICD-10-CM

## 2023-01-10 DIAGNOSIS — E78.5 HYPERLIPIDEMIA LDL GOAL <100: ICD-10-CM

## 2023-01-10 RX ORDER — METOPROLOL SUCCINATE 25 MG/1
25 TABLET, EXTENDED RELEASE ORAL DAILY
Qty: 90 TABLET | Refills: 1 | Status: SHIPPED | OUTPATIENT
Start: 2023-01-10 | End: 2023-03-16

## 2023-01-10 RX ORDER — ATORVASTATIN CALCIUM 20 MG/1
20 TABLET, FILM COATED ORAL EVERY EVENING
Qty: 90 TABLET | Refills: 3 | Status: SHIPPED | OUTPATIENT
Start: 2023-01-10 | End: 2023-03-16

## 2023-02-12 ENCOUNTER — HEALTH MAINTENANCE LETTER (OUTPATIENT)
Age: 58
End: 2023-02-12

## 2023-03-16 ENCOUNTER — MYC MEDICAL ADVICE (OUTPATIENT)
Dept: CARDIOLOGY | Facility: CLINIC | Age: 58
End: 2023-03-16

## 2023-03-16 ENCOUNTER — OFFICE VISIT (OUTPATIENT)
Dept: CARDIOLOGY | Facility: CLINIC | Age: 58
End: 2023-03-16
Attending: INTERNAL MEDICINE
Payer: COMMERCIAL

## 2023-03-16 ENCOUNTER — HOSPITAL ENCOUNTER (OUTPATIENT)
Dept: CARDIOLOGY | Facility: CLINIC | Age: 58
Discharge: HOME | End: 2023-03-16
Attending: INTERNAL MEDICINE | Admitting: INTERNAL MEDICINE
Payer: COMMERCIAL

## 2023-03-16 ENCOUNTER — LAB (OUTPATIENT)
Dept: LAB | Facility: CLINIC | Age: 58
End: 2023-03-16
Attending: INTERNAL MEDICINE
Payer: COMMERCIAL

## 2023-03-16 VITALS
BODY MASS INDEX: 31.07 KG/M2 | OXYGEN SATURATION: 95 % | HEIGHT: 65 IN | HEART RATE: 67 BPM | SYSTOLIC BLOOD PRESSURE: 136 MMHG | DIASTOLIC BLOOD PRESSURE: 86 MMHG | WEIGHT: 186.5 LBS

## 2023-03-16 DIAGNOSIS — Q24.5 ALCAPA (ANOMALOUS LEFT CORONARY ARTERY FROM THE PULMONARY ARTERY): ICD-10-CM

## 2023-03-16 DIAGNOSIS — I25.5 ISCHEMIC CARDIOMYOPATHY: ICD-10-CM

## 2023-03-16 DIAGNOSIS — Q24.9 CONGENITAL HEART ANOMALY: Primary | ICD-10-CM

## 2023-03-16 DIAGNOSIS — R73.9 ELEVATED BLOOD SUGAR: ICD-10-CM

## 2023-03-16 DIAGNOSIS — R94.39 ABNORMAL CARDIOVASCULAR STRESS TEST: ICD-10-CM

## 2023-03-16 DIAGNOSIS — E66.811 CLASS 1 OBESITY DUE TO EXCESS CALORIES WITH SERIOUS COMORBIDITY AND BODY MASS INDEX (BMI) OF 31.0 TO 31.9 IN ADULT: Primary | ICD-10-CM

## 2023-03-16 DIAGNOSIS — R00.2 PALPITATIONS: ICD-10-CM

## 2023-03-16 DIAGNOSIS — E66.09 CLASS 1 OBESITY DUE TO EXCESS CALORIES WITH SERIOUS COMORBIDITY AND BODY MASS INDEX (BMI) OF 31.0 TO 31.9 IN ADULT: Primary | ICD-10-CM

## 2023-03-16 DIAGNOSIS — E66.09 CLASS 1 OBESITY DUE TO EXCESS CALORIES WITH SERIOUS COMORBIDITY AND BODY MASS INDEX (BMI) OF 31.0 TO 31.9 IN ADULT: ICD-10-CM

## 2023-03-16 DIAGNOSIS — E78.5 HYPERLIPIDEMIA LDL GOAL <100: ICD-10-CM

## 2023-03-16 DIAGNOSIS — Q24.9 CONGENITAL HEART ANOMALY: ICD-10-CM

## 2023-03-16 DIAGNOSIS — R06.02 SOB (SHORTNESS OF BREATH): Primary | ICD-10-CM

## 2023-03-16 DIAGNOSIS — E66.811 CLASS 1 OBESITY DUE TO EXCESS CALORIES WITH SERIOUS COMORBIDITY AND BODY MASS INDEX (BMI) OF 31.0 TO 31.9 IN ADULT: ICD-10-CM

## 2023-03-16 LAB
ALBUMIN SERPL BCG-MCNC: 4.3 G/DL (ref 3.5–5.2)
ALP SERPL-CCNC: 127 U/L (ref 35–104)
ALT SERPL W P-5'-P-CCNC: 43 U/L (ref 10–35)
ALT SERPL W P-5'-P-CCNC: 43 U/L (ref 10–35)
ANION GAP SERPL CALCULATED.3IONS-SCNC: 10 MMOL/L (ref 7–15)
AST SERPL W P-5'-P-CCNC: 32 U/L (ref 10–35)
BI-PLANE LVEF ECHO: NORMAL
BILIRUB SERPL-MCNC: 0.6 MG/DL
BUN SERPL-MCNC: 10.1 MG/DL (ref 8–23)
CALCIUM SERPL-MCNC: 9.2 MG/DL (ref 8.6–10)
CHLORIDE SERPL-SCNC: 104 MMOL/L (ref 98–107)
CHOLEST SERPL-MCNC: 160 MG/DL
CREAT SERPL-MCNC: 0.67 MG/DL (ref 0.51–0.95)
DEPRECATED HCO3 PLAS-SCNC: 28 MMOL/L (ref 22–29)
ERYTHROCYTE [DISTWIDTH] IN BLOOD BY AUTOMATED COUNT: 11.7 % (ref 10–15)
GFR SERPL CREATININE-BSD FRML MDRD: >90 ML/MIN/1.73M2
GLUCOSE SERPL-MCNC: 115 MG/DL (ref 70–99)
HBA1C MFR BLD: 6.5 %
HCT VFR BLD AUTO: 45.3 % (ref 35–47)
HDLC SERPL-MCNC: 31 MG/DL
HGB BLD-MCNC: 15.3 G/DL (ref 11.7–15.7)
LDLC SERPL CALC-MCNC: 76 MG/DL
MCH RBC QN AUTO: 29.7 PG (ref 26.5–33)
MCHC RBC AUTO-ENTMCNC: 33.8 G/DL (ref 31.5–36.5)
MCV RBC AUTO: 88 FL (ref 78–100)
NONHDLC SERPL-MCNC: 129 MG/DL
PLATELET # BLD AUTO: 353 10E3/UL (ref 150–450)
POTASSIUM SERPL-SCNC: 4.5 MMOL/L (ref 3.4–5.3)
PROT SERPL-MCNC: 7.7 G/DL (ref 6.4–8.3)
RBC # BLD AUTO: 5.15 10E6/UL (ref 3.8–5.2)
SODIUM SERPL-SCNC: 142 MMOL/L (ref 136–145)
TRIGL SERPL-MCNC: 263 MG/DL
WBC # BLD AUTO: 6.8 10E3/UL (ref 4–11)

## 2023-03-16 PROCEDURE — 99215 OFFICE O/P EST HI 40 MIN: CPT | Performed by: INTERNAL MEDICINE

## 2023-03-16 PROCEDURE — 93306 TTE W/DOPPLER COMPLETE: CPT | Mod: 26 | Performed by: INTERNAL MEDICINE

## 2023-03-16 PROCEDURE — 93306 TTE W/DOPPLER COMPLETE: CPT

## 2023-03-16 PROCEDURE — 80053 COMPREHEN METABOLIC PANEL: CPT | Performed by: INTERNAL MEDICINE

## 2023-03-16 PROCEDURE — 85027 COMPLETE CBC AUTOMATED: CPT | Performed by: INTERNAL MEDICINE

## 2023-03-16 PROCEDURE — 80061 LIPID PANEL: CPT | Performed by: INTERNAL MEDICINE

## 2023-03-16 PROCEDURE — 93000 ELECTROCARDIOGRAM COMPLETE: CPT | Performed by: INTERNAL MEDICINE

## 2023-03-16 PROCEDURE — 83036 HEMOGLOBIN GLYCOSYLATED A1C: CPT | Performed by: INTERNAL MEDICINE

## 2023-03-16 PROCEDURE — 36415 COLL VENOUS BLD VENIPUNCTURE: CPT | Performed by: INTERNAL MEDICINE

## 2023-03-16 RX ORDER — METOPROLOL SUCCINATE 25 MG/1
25 TABLET, EXTENDED RELEASE ORAL DAILY
Qty: 90 TABLET | Refills: 3 | Status: SHIPPED | OUTPATIENT
Start: 2023-03-16 | End: 2024-06-05

## 2023-03-16 RX ORDER — ATORVASTATIN CALCIUM 20 MG/1
20 TABLET, FILM COATED ORAL EVERY EVENING
Qty: 90 TABLET | Refills: 3 | Status: SHIPPED | OUTPATIENT
Start: 2023-03-16 | End: 2024-06-05

## 2023-03-16 NOTE — PROGRESS NOTES
CARDIOLOGY CONSULTATION:    Ms. 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  I ended up cathing her given she had some chest pressure with exertion, and she had a positive nuclear stress test in January 2019.  The cath showed that she had a ligated left main, and then they plugged a LIMA into the mid LAD.  She has an aneurysm at the implantation site of the LIMA in her LAD, and she has collaterals from her massive right coronary artery.   Her coronary artery is between 9-11 mm, so quite enlarged, but there is nothing that would need intervention or a surgery could repair.  She is on anticoagulation now with Eliquis.     Since I last saw her she has been feeling well.  She has been counting calories and has lost 15 lbs but has not really been losing anymore despite exercising.  Her blood sugar has been elevated - last A1C in 2020 was 5.9 and has not been rechecked since then.      Echo is stable with EF around 40% and apical anterior akinesis that is unchanged.     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    abnl left coronary artery defect     Sleep apnea     cpap       CURRENT MEDICATIONS:  Current Outpatient Medications   Medication Sig Dispense Refill     acetaminophen (TYLENOL) 325 MG tablet Take 2 tablets (650 mg) by mouth every 4 hours as needed for other (mild pain) 100 tablet 0     amoxicillin (AMOXIL) 500 MG tablet Take 2,000 mg by mouth daily as needed (1 HOUR PRIOR TO DENTAL VISITS)       atorvastatin (LIPITOR) 20 MG tablet Take 1 tablet (20 mg) by mouth every evening 90 tablet 3     clobetasol (TEMOVATE) 0.05 % external ointment Apply topically 2 times daily as needed (hand eczema) 45 g 0     metoprolol succinate ER (TOPROL XL) 25 MG 24 hr  tablet Take 1 tablet (25 mg) by mouth daily 90 tablet 3     naproxen sodium (ANAPROX) 220 MG tablet Take 220 mg by mouth daily       Psyllium (METAMUCIL PO) Take 1 tablet by mouth every evening       rivaroxaban ANTICOAGULANT (XARELTO ANTICOAGULANT) 20 MG TABS tablet Take 1 tablet (20 mg) by mouth daily (with dinner) 90 tablet 3     Simethicone (GAS-X PO) Take 1 tablet by mouth daily as needed for intestinal gas         PAST SURGICAL HISTORY:  Past Surgical History:   Procedure Laterality Date     ARTHROPLASTY KNEE Left 2021    Procedure: left total knee arthroplasty;  Surgeon: Anseth, Scott Duane, MD;  Location:  OR     CARDIAC SURGERY      CABG     CV RIGHT HEART CATH MEASUREMENTS RECORDED N/A 3/7/2019    Procedure: RHC with Coronay Angiogram;  Surgeon: Shikha Wilson MD;  Location:  HEART CARDIAC CATH LAB     ORTHOPEDIC SURGERY      right total knee     ZZC NONSPECIFIC PROCEDURE      left coronary artery revision       ALLERGIES  Aspirin and Ibuprofen    FAMILY HX:  Family History   Problem Relation Age of Onset     Heart Disease Maternal Grandfather         MI     Cancer - colorectal Paternal Grandfather      Diabetes Maternal Uncle      Thyroid Disease Sister         hypothyroidism     Neurologic Disorder Sister         MS diagnosed at 40y.o.       SOCIAL HX:  Social History     Socioeconomic History     Marital status:      Spouse name: Adolfo     Number of children: 2     Years of education: 14 years     Highest education level: None   Occupational History     Occupation: management     Employer: [employer]     Comment: [employer]   Tobacco Use     Smoking status: Former     Packs/day: 1.50     Years: 10.00     Pack years: 15.00     Types: Cigarettes     Quit date: 1991     Years since quittin.7     Smokeless tobacco: Never   Vaping Use     Vaping Use: Never used   Substance and Sexual Activity     Alcohol use: Yes     Alcohol/week: 0.0 standard  "drinks     Comment: socially     Drug use: No     Sexual activity: Yes     Partners: Male   Other Topics Concern      Service No     Blood Transfusions No     Caffeine Concern No     Occupational Exposure Yes     Comment: work in biomedical     Hobby Hazards No     Sleep Concern No     Stress Concern No     Weight Concern Yes     Comment: getting liposuction     Special Diet No     Exercise No     Bike Helmet No     Seat Belt Yes     Self-Exams Yes     Comment: sometimes       ROS:  Constitutional: No fever, chills, or sweats. No weight gain/loss.   ENT: No visual disturbance, ear ache, epistaxis, sore throat.   Allergies/Immunologic: Negative.   Respiratory: No cough, hemoptysis.   Cardiovascular: As per HPI.   GI: No nausea, vomiting, hematemesis, melena, or hematochezia.   : No urinary frequency, dysuria, or hematuria.   Integument: Negative.   Psychiatric: Negative.   Neuro: Negative.   Endocrinology: Negative.   Musculoskeletal: No myalgia.    VITAL SIGNS:  /86 (BP Location: Left arm, Patient Position: Sitting)   Pulse 67   Ht 1.638 m (5' 4.5\")   Wt 84.6 kg (186 lb 8 oz)   LMP 04/25/2011   SpO2 95%   BMI 31.52 kg/m    Body mass index is 31.52 kg/m .  Wt Readings from Last 2 Encounters:   03/16/23 84.6 kg (186 lb 8 oz)   11/15/22 87.1 kg (192 lb)       PHYSICAL EXAM  Jesenia Fofana IS A 57 year old female.in no acute distress.  HEENT: Unremarkable.  Neck: JVP normal.  Lungs: CTA.  Cor: RRR. Normal S1 and S2.  No murmur, rub, or gallop.   Abd: Soft, nontender, nondistended. Extremities: No C/C/E.    Neuro: Grossly intact.    LABS    Lab Results   Component Value Date    WBC 6.8 03/16/2023    WBC 6.2 03/07/2019     Lab Results   Component Value Date    RBC 5.15 03/16/2023    RBC 4.67 03/07/2019     Lab Results   Component Value Date    HGB 15.3 03/16/2023    HGB 13.0 05/25/2021     Lab Results   Component Value Date    HCT 45.3 03/16/2023    HCT 40.5 03/07/2019     No components found for: " MCT  Lab Results   Component Value Date    MCV 88 03/16/2023    MCV 87 03/07/2019     Lab Results   Component Value Date    MCH 29.7 03/16/2023    MCH 29.6 03/07/2019     Lab Results   Component Value Date    MCHC 33.8 03/16/2023    MCHC 34.1 03/07/2019     Lab Results   Component Value Date    RDW 11.7 03/16/2023    RDW 12.1 03/07/2019     Lab Results   Component Value Date     03/16/2023     05/25/2021      Recent Labs   Lab Test 03/16/23  0953 05/25/21  0752 05/24/21  1251 05/07/21  1100     --   --  141   POTASSIUM 4.5  --   --  3.9   CHLORIDE 104  --   --  107   CO2 28  --   --  27   ANIONGAP 10  --   --  7   * 149*  --  81   BUN 10.1  --   --  9   CR 0.67  --  0.62 0.72   JOSEPHINE 9.2  --   --  8.8     Recent Labs   Lab Test 09/10/20  0910 02/14/19  0940   CHOL 146 153   HDL 49* 48*   LDL 72 74   TRIG 125 155*   NHDL 97 105        IMPRESSION, REPORT AND PLAN:   1.  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 Norris with repair, including ligation of the left main and a LIMA to the LAD.   2. Ejection fraction of 40%-45%.   3.  Coronary artery aneurysm involving the mid LAD and the right coronary artery on anticoagulation.   4.  History of thyrotoxicosis.   5. S/P bilateral knee replacement  6. BMI 32  7. Elevated blood sugar - last A1C 5.9     DISCUSSION:  It was a pleasure to see Ms. Fofana in followup.  Clinically, she is doing well.  I am so glad to hear how much better she feels with her knee replacements.  She is tolerating anticoagulation with the CAD aneurysms, and her severe apical wall motion abnormality.  We discussed with her elevated blood sugar and her cardiomyopathy and BMI we could try Ozempic, which she would like to do.  We also discussed Jardiance as another option.      We will plan to see her back in a year or sooner if there are any issues in the interim.  She understands and is in agreement with the plan as  outlined.       It was a pleasure to see her.  Please do not hesitate to contact me with any questions or concerns.         MARCIAL CRAIG MD   41 minutes face-face, documentation and review of records on day of visit

## 2023-03-16 NOTE — NURSING NOTE
Cardiac Testing: Patient given instructions regarding  echocardiogram . Discussed purpose, preparation, procedure and when to expect results reported back to the patient. Patient demonstrated understanding of this information and agreed to call with further questions or concerns.    Labs: Patient was given results of the laboratory testing obtained today. Patient was instructed to return for the next laboratory testing in 1 year . Patient demonstrated understanding of this information and agreed to call with further questions or concerns.     Med Reconcile: Reviewed and verified all current medications with the patient. The updated medication list was printed and given to the patient.    New Medication: Patient was educated regarding newly prescribed medication, including discussion of  the indication, administration, side effects, and when to report to MD or RN. Patient demonstrated understanding of this information and agreed to call with further questions or concerns.    Return Appointment: Patient given instructions regarding scheduling next clinic visit. Patient demonstrated understanding of this information and agreed to call with further questions or concerns.    Patient stated she understood all health information given and agreed to call with further questions or concerns.

## 2023-03-16 NOTE — PATIENT INSTRUCTIONS
You were seen today in the Adult Congenital and Cardiovascular Genetics Clinic at the [facility].    Cardiology Providers you saw during your visit:  ASIA Wilson MD    Diagnosis:  anomalous coronary arteries    Results:  ASIA Wilson MD reviewed the results of your EKG, lab and echo testing today in clinic.    Recommendations for you:    We will call you with the results of your hemoglobin A1c and start Jardiance 10 mg daily if it is elevated  START Ozempic      General Cardiac Recommendations:  Continue to eat a heart healthy, low salt diet.  Continue to get 20-30 minutes of aerobic activity, 4-5 days per week.  Examples of aerobic activity include walking, running, swimming, cycling, etc.  Continue to observe good oral hygiene, with regular dental visits.      SBE prophylaxis:   Yes____  No__x__      Exercise restrictions:   Yes__X__  No____         If yes, list restrictions:  Must be allowed to rest if fatigued or SOB      FASTING CHOLESTEROL was checked in the last 5 years YES__x_  NO___ (2020)  If no, please follow up with your primary care physician. You should have a cholesterol screening every 5 years.      Follow-up:  Follow up with Dr Wilson in 1 year with an echo and Hemoglobin A1c prior    If you have questions or concerns please contact us at:    Joanie Polk RN, BSN   Jessica Parra (Scheduling)  Nurse Care Coordinator     Clinic   Adult Congenital and CV Genetics   Adult Congenital and CV Genetic  [facility]   [facility]  (P) [phone]     (P) [phone]            (F) [phone]        For after hours urgent needs, call [phone] and ask to speak to the Adult Congenital Physician on call.  Mention Job Code 0401.    For emergencies call 911.    [facility]  Mail Code [id number]  [address], [place]   [address]

## 2023-03-16 NOTE — LETTER
3/16/2023    Edgardo Mckeon MD  [address]  [place] [address]    RE: Jesenia Fofana       Dear Colleague,     I had the pleasure of seeing Jesenia Fofana in the [facility].  CARDIOLOGY CONSULTATION:    Ms. 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  I ended up cathing her given she had some chest pressure with exertion, and she had a positive nuclear stress test in January 2019.  The cath showed that she had a ligated left main, and then they plugged a LIMA into the mid LAD.  She has an aneurysm at the implantation site of the LIMA in her LAD, and she has collaterals from her massive right coronary artery.   Her coronary artery is between 9-11 mm, so quite enlarged, but there is nothing that would need intervention or a surgery could repair.  She is on anticoagulation now with Eliquis.     Since I last saw her she has been feeling well.  She has been counting calories and has lost 15 lbs but has not really been losing anymore despite exercising.  Her blood sugar has been elevated - last A1C in 2020 was 5.9 and has not been rechecked since then.      Echo is stable with EF around 40% and apical anterior akinesis that is unchanged.     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    abnl left coronary artery defect     Sleep apnea     cpap       CURRENT MEDICATIONS:  Current Outpatient Medications   Medication Sig Dispense Refill     acetaminophen (TYLENOL) 325 MG tablet Take 2 tablets (650 mg) by mouth every 4 hours as needed for other (mild pain) 100 tablet 0     amoxicillin (AMOXIL) 500 MG tablet Take 2,000 mg by mouth daily as needed (1 HOUR PRIOR TO DENTAL VISITS)       atorvastatin (LIPITOR) 20 MG tablet Take 1 tablet (20  mg) by mouth every evening 90 tablet 3     clobetasol (TEMOVATE) 0.05 % external ointment Apply topically 2 times daily as needed (hand eczema) 45 g 0     metoprolol succinate ER (TOPROL XL) 25 MG 24 hr tablet Take 1 tablet (25 mg) by mouth daily 90 tablet 3     naproxen sodium (ANAPROX) 220 MG tablet Take 220 mg by mouth daily       Psyllium (METAMUCIL PO) Take 1 tablet by mouth every evening       rivaroxaban ANTICOAGULANT (XARELTO ANTICOAGULANT) 20 MG TABS tablet Take 1 tablet (20 mg) by mouth daily (with dinner) 90 tablet 3     Simethicone (GAS-X PO) Take 1 tablet by mouth daily as needed for intestinal gas         PAST SURGICAL HISTORY:  Past Surgical History:   Procedure Laterality Date     ARTHROPLASTY KNEE Left 5/24/2021    Procedure: left total knee arthroplasty;  Surgeon: Anseth, Scott Duane, MD;  Location:  OR     CARDIAC SURGERY      CABG     CV RIGHT HEART CATH MEASUREMENTS RECORDED N/A 3/7/2019    Procedure: RHC with Coronay Angiogram;  Surgeon: Shikha Wilson MD;  Location:  HEART CARDIAC CATH LAB     ORTHOPEDIC SURGERY      right total knee     ZZC NONSPECIFIC PROCEDURE  1997    left coronary artery revision       ALLERGIES  Aspirin and Ibuprofen    FAMILY HX:  Family History   Problem Relation Age of Onset     Heart Disease Maternal Grandfather         MI     Cancer - colorectal Paternal Grandfather      Diabetes Maternal Uncle      Thyroid Disease Sister         hypothyroidism     Neurologic Disorder Sister         MS diagnosed at 40y.o.       SOCIAL HX:  Social History     Socioeconomic History     Marital status:      Spouse name: Adolfo     Number of children: 2     Years of education: 14 years     Highest education level: None   Occupational History     Occupation: management     Employer: [employer]     Comment: [employer]   Tobacco Use     Smoking status: Former     Packs/day: 1.50     Years: 10.00     Pack years: 15.00     Types: Cigarettes     Quit  "date: 1991     Years since quittin.7     Smokeless tobacco: Never   Vaping Use     Vaping Use: Never used   Substance and Sexual Activity     Alcohol use: Yes     Alcohol/week: 0.0 standard drinks     Comment: socially     Drug use: No     Sexual activity: Yes     Partners: Male   Other Topics Concern      Service No     Blood Transfusions No     Caffeine Concern No     Occupational Exposure Yes     Comment: work in biomedical     Hobby Hazards No     Sleep Concern No     Stress Concern No     Weight Concern Yes     Comment: getting liposuction     Special Diet No     Exercise No     Bike Helmet No     Seat Belt Yes     Self-Exams Yes     Comment: sometimes       ROS:  Constitutional: No fever, chills, or sweats. No weight gain/loss.   ENT: No visual disturbance, ear ache, epistaxis, sore throat.   Allergies/Immunologic: Negative.   Respiratory: No cough, hemoptysis.   Cardiovascular: As per HPI.   GI: No nausea, vomiting, hematemesis, melena, or hematochezia.   : No urinary frequency, dysuria, or hematuria.   Integument: Negative.   Psychiatric: Negative.   Neuro: Negative.   Endocrinology: Negative.   Musculoskeletal: No myalgia.    VITAL SIGNS:  /86 (BP Location: Left arm, Patient Position: Sitting)   Pulse 67   Ht 1.638 m (5' 4.5\")   Wt 84.6 kg (186 lb 8 oz)   LMP 2011   SpO2 95%   BMI 31.52 kg/m    Body mass index is 31.52 kg/m .  Wt Readings from Last 2 Encounters:   23 84.6 kg (186 lb 8 oz)   11/15/22 87.1 kg (192 lb)       PHYSICAL EXAM  Jesenia Fofana IS A 57 year old female.in no acute distress.  HEENT: Unremarkable.  Neck: JVP normal.  Lungs: CTA.  Cor: RRR. Normal S1 and S2.  No murmur, rub, or gallop.   Abd: Soft, nontender, nondistended. Extremities: No C/C/E.    Neuro: Grossly intact.    LABS    Lab Results   Component Value Date    WBC 6.8 2023    WBC 6.2 2019     Lab Results   Component Value Date    RBC 5.15 2023    RBC 4.67 2019 "     Lab Results   Component Value Date    HGB 15.3 03/16/2023    HGB 13.0 05/25/2021     Lab Results   Component Value Date    HCT 45.3 03/16/2023    HCT 40.5 03/07/2019     No components found for: MCT  Lab Results   Component Value Date    MCV 88 03/16/2023    MCV 87 03/07/2019     Lab Results   Component Value Date    MCH 29.7 03/16/2023    MCH 29.6 03/07/2019     Lab Results   Component Value Date    MCHC 33.8 03/16/2023    MCHC 34.1 03/07/2019     Lab Results   Component Value Date    RDW 11.7 03/16/2023    RDW 12.1 03/07/2019     Lab Results   Component Value Date     03/16/2023     05/25/2021      Recent Labs   Lab Test 03/16/23  0953 05/25/21  0752 05/24/21  1251 05/07/21  1100     --   --  141   POTASSIUM 4.5  --   --  3.9   CHLORIDE 104  --   --  107   CO2 28  --   --  27   ANIONGAP 10  --   --  7   * 149*  --  81   BUN 10.1  --   --  9   CR 0.67  --  0.62 0.72   JOSEPHINE 9.2  --   --  8.8     Recent Labs   Lab Test 09/10/20  0910 02/14/19  0940   CHOL 146 153   HDL 49* 48*   LDL 72 74   TRIG 125 155*   NHDL 97 105        IMPRESSION, REPORT AND PLAN:   1.  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 Norris with repair, including ligation of the left main and a LIMA to the LAD.   2. Ejection fraction of 40%-45%.   3.  Coronary artery aneurysm involving the mid LAD and the right coronary artery on anticoagulation.   4.  History of thyrotoxicosis.   5. S/P bilateral knee replacement  6. BMI 32  7. Elevated blood sugar - last A1C 5.9     DISCUSSION:  It was a pleasure to see Ms. Fofana in followup.  Clinically, she is doing well.  I am so glad to hear how much better she feels with her knee replacements.  She is tolerating anticoagulation with the CAD aneurysms, and her severe apical wall motion abnormality.  We discussed with her elevated blood sugar and her cardiomyopathy and BMI we could try Ozempic, which she would like to do.   We also discussed Jardiance as another option.      We will plan to see her back in a year or sooner if there are any issues in the interim.  She understands and is in agreement with the plan as outlined.       It was a pleasure to see her.  Please do not hesitate to contact me with any questions or concerns.         MARCIAL WILSON MD   41 minutes face-face, documentation and review of records on day of visit     Thank you for allowing me to participate in the care of your patient.      Sincerely,     Marcial Wilson MD     [facility]  cc:   Marcial Wilson MD  [address]  [place] [address]

## 2023-03-25 ENCOUNTER — HEALTH MAINTENANCE LETTER (OUTPATIENT)
Age: 58
End: 2023-03-25

## 2023-05-12 DIAGNOSIS — L30.9 ECZEMA, UNSPECIFIED TYPE: ICD-10-CM

## 2023-05-12 RX ORDER — CLOBETASOL PROPIONATE 0.5 MG/G
OINTMENT TOPICAL 2 TIMES DAILY PRN
Qty: 45 G | Refills: 0 | OUTPATIENT
Start: 2023-05-12

## 2023-05-12 NOTE — TELEPHONE ENCOUNTER
I have not seen her since 2021    RF declined    Edgardo Mckeon MD  [facility]

## 2023-05-19 ENCOUNTER — LAB (OUTPATIENT)
Dept: LAB | Facility: CLINIC | Age: 58
End: 2023-05-19
Payer: COMMERCIAL

## 2023-05-19 DIAGNOSIS — R00.2 PALPITATIONS: ICD-10-CM

## 2023-05-19 DIAGNOSIS — E66.811 CLASS 1 OBESITY DUE TO EXCESS CALORIES WITH SERIOUS COMORBIDITY AND BODY MASS INDEX (BMI) OF 31.0 TO 31.9 IN ADULT: ICD-10-CM

## 2023-05-19 DIAGNOSIS — Q24.5 ALCAPA (ANOMALOUS LEFT CORONARY ARTERY FROM THE PULMONARY ARTERY): ICD-10-CM

## 2023-05-19 DIAGNOSIS — R94.39 ABNORMAL CARDIOVASCULAR STRESS TEST: ICD-10-CM

## 2023-05-19 DIAGNOSIS — I25.5 ISCHEMIC CARDIOMYOPATHY: ICD-10-CM

## 2023-05-19 DIAGNOSIS — R73.9 ELEVATED BLOOD SUGAR: ICD-10-CM

## 2023-05-19 DIAGNOSIS — E66.09 CLASS 1 OBESITY DUE TO EXCESS CALORIES WITH SERIOUS COMORBIDITY AND BODY MASS INDEX (BMI) OF 31.0 TO 31.9 IN ADULT: ICD-10-CM

## 2023-05-19 DIAGNOSIS — E78.5 HYPERLIPIDEMIA LDL GOAL <100: ICD-10-CM

## 2023-05-19 LAB
ALBUMIN SERPL BCG-MCNC: 4.6 G/DL (ref 3.5–5.2)
ALP SERPL-CCNC: 86 U/L (ref 35–104)
ALT SERPL W P-5'-P-CCNC: 45 U/L (ref 10–35)
ANION GAP SERPL CALCULATED.3IONS-SCNC: 11 MMOL/L (ref 7–15)
AST SERPL W P-5'-P-CCNC: 33 U/L (ref 10–35)
BILIRUB SERPL-MCNC: 0.4 MG/DL
BUN SERPL-MCNC: 12.4 MG/DL (ref 8–23)
CALCIUM SERPL-MCNC: 9.3 MG/DL (ref 8.6–10)
CHLORIDE SERPL-SCNC: 106 MMOL/L (ref 98–107)
CREAT SERPL-MCNC: 0.74 MG/DL (ref 0.51–0.95)
DEPRECATED HCO3 PLAS-SCNC: 26 MMOL/L (ref 22–29)
GFR SERPL CREATININE-BSD FRML MDRD: >90 ML/MIN/1.73M2
GLUCOSE SERPL-MCNC: 121 MG/DL (ref 70–99)
HBA1C MFR BLD: 5.4 % (ref 0–5.6)
POTASSIUM SERPL-SCNC: 4.8 MMOL/L (ref 3.4–5.3)
PROT SERPL-MCNC: 7 G/DL (ref 6.4–8.3)
SODIUM SERPL-SCNC: 143 MMOL/L (ref 136–145)

## 2023-05-19 PROCEDURE — 36415 COLL VENOUS BLD VENIPUNCTURE: CPT

## 2023-05-19 PROCEDURE — 80053 COMPREHEN METABOLIC PANEL: CPT

## 2023-05-19 PROCEDURE — 83036 HEMOGLOBIN GLYCOSYLATED A1C: CPT

## 2023-06-20 ENCOUNTER — TELEPHONE (OUTPATIENT)
Dept: CARDIOLOGY | Facility: CLINIC | Age: 58
End: 2023-06-20
Payer: COMMERCIAL

## 2023-06-20 NOTE — TELEPHONE ENCOUNTER
Health Call Center    Phone Message    May a detailed message be left on voicemail: yes     Reason for Call: Medication Refill Request    Has the patient contacted the pharmacy for the refill? Yes   Name of medication being requested: metoprolol succinate ER (TOPROL XL)   Provider who prescribed the medication: dr howard   Pharmacy:      [id number] [place] [place] [facility] [address] AT [address] OF [address]    Date medication is needed: this week    Patient says her mychart says she has 3 refills but when she does the request it isnt allowing her patient has the rest of this week left than she is out, please advise, thank you.         Action Taken: Other: cardiology    Travel Screening: Not Applicable   Thank you!  [facility]

## 2023-06-21 ENCOUNTER — TELEPHONE (OUTPATIENT)
Dept: CARDIOLOGY | Facility: CLINIC | Age: 58
End: 2023-06-21
Payer: COMMERCIAL

## 2023-06-21 NOTE — TELEPHONE ENCOUNTER
I called Jesenia to confirm she does have refills available for her Metoprolol. She tried calling but was on hold forever. She then tried filling it online but is using the number from her bottle that was filled in February. I told her that number won't work because she has a new prescription from March. I told her I will call [facility] after 9 AM and ask them to fill it for her. I will call Jesenia back and let her know that it is taken care of.     10:19 AM:   I called Jesenia and told her [facility] is filling her Metoprolol now.

## 2023-06-21 NOTE — TELEPHONE ENCOUNTER
I called [facility] on behalf of Jesenia regarding her Metoprolol. She tried filling it yesterday and the computer said it is on hold. The pharmacist said he will fill it now.

## 2023-09-01 ENCOUNTER — TELEPHONE (OUTPATIENT)
Dept: FAMILY MEDICINE | Facility: CLINIC | Age: 58
End: 2023-09-01

## 2023-09-01 NOTE — TELEPHONE ENCOUNTER
Patient Quality Outreach    Patient is due for the following:   Colon Cancer Screening  Breast Cancer Screening - Mammogram  Cervical Cancer Screening - PAP Needed  Physical Preventive Adult Physical    Next Steps:   Schedule a Adult Preventative  Schedule a PAP  Schedule a Colorectal cancer screening  Schedule a Mammo  Type of outreach:    Sent letter.      Questions for provider review:    None           Jaqueline Jenkins MA

## 2023-09-01 NOTE — LETTER
September 1, 2023      Jesenia Fofana  [address] [address]        Dear Jesenia,    I care about your health and have reviewed your health plan. I have reviewed your medical conditions, medication list, and lab results and am making recommendations based on this review, to better manage your health.    You are in particular need of attention regarding:  -Breast Cancer Screening  -Colon Cancer Screening  -Cervical Cancer Screening  -Wellness (Physical) Visit     I am recommending that you:  -schedule a WELLNESS (Physical) APPOINTMENT with me.   I will check fasting labs the same day - nothing to eat except water and meds for 8-10 hours prior.  -schedule a MAMMOGRAM which is due. We have a mobile mammogram unit that comes to [place]  clinic.To schedule please call the clinic at [phone]. Or, you can call [facility] at [phone] to schedule this.  Please disregard this reminder if you have had this exam elsewhere within the last year.  It would be helpful for us to have a copy of your mammogram report in our file so that we can best coordinate your care.  -schedule a COLONOSCOPY to look for colon cancer (due every 10 years or 5 years in higher risk situations.)   Colon cancer is now the second leading cause of death in the [place] for both men and women and there are over 130,000 new cases and 50,000 deaths per year from colon cancer.  Colonoscopies can prevent 90-95% of these deaths.  Problem lesions can be removed before they ever become cancer.  This test is not only looking for cancer, but also getting rid of precancerious lesions.  If you do not wish to do a colonoscopy or cannot afford to do one, at this time, there is another option. It is called a FIT test or Fecal Immunochemical Occult Blood Test (take home stool sample kit).  It does not replace the colonoscopy for colorectal cancer screening, but it can detect hidden bleeding in the lower colon.   It does need to be repeated every year and if a positive result is obtained, you would be referred for a colonoscopy.  If you have completed either one of these tests at another facility, please have the records sent to our clinic so that we can best coordinate your care.  -schedule a PAP SMEAR EXAM which is due.  Please disregard this reminder if you have had this exam elsewhere within the last year.  It would be helpful for us to have a copy of your recent pap smear report in our file so that we can best coordinate your care.    Here is a list of Health Maintenance topics that are due now or due soon:  Health Maintenance Due   Topic Date Due    ANNUAL REVIEW OF HM ORDERS  Never done    Discuss Advance Care Planning  Never done    Pneumococcal Vaccine (1 - PCV) Never done    Zoster (Shingles) Vaccine (1 of 2) Never done    Colposcopy  Never done    HPV Screening  05/06/2021    PAP Smear  05/06/2021    Yearly Preventive Visit  11/06/2021    Colorectal Cancer Screening  12/30/2021    COVID-19 Vaccine (4 - Pfizer series) 02/22/2022    PHQ-2 (once per calendar year)  01/01/2023    Mammogram  01/11/2023    Flu Vaccine (1) 09/01/2023       Please call us at [phone] (or use BriefCam) to address the above recommendations.     Thank you for trusting [facility] and we appreciate the opportunity to serve you.  We look forward to supporting your healthcare needs in the future.    Healthy Regards,    Edgardo Mckeon MD

## 2023-09-11 DIAGNOSIS — Q24.5 ALCAPA (ANOMALOUS LEFT CORONARY ARTERY FROM THE PULMONARY ARTERY): ICD-10-CM

## 2023-09-11 DIAGNOSIS — R00.2 PALPITATIONS: ICD-10-CM

## 2023-09-11 DIAGNOSIS — E66.811 CLASS 1 OBESITY DUE TO EXCESS CALORIES WITH SERIOUS COMORBIDITY AND BODY MASS INDEX (BMI) OF 31.0 TO 31.9 IN ADULT: ICD-10-CM

## 2023-09-11 DIAGNOSIS — R73.9 ELEVATED BLOOD SUGAR: ICD-10-CM

## 2023-09-11 DIAGNOSIS — R94.39 ABNORMAL CARDIOVASCULAR STRESS TEST: ICD-10-CM

## 2023-09-11 DIAGNOSIS — E66.09 CLASS 1 OBESITY DUE TO EXCESS CALORIES WITH SERIOUS COMORBIDITY AND BODY MASS INDEX (BMI) OF 31.0 TO 31.9 IN ADULT: ICD-10-CM

## 2023-09-11 DIAGNOSIS — E78.5 HYPERLIPIDEMIA LDL GOAL <100: ICD-10-CM

## 2023-09-11 DIAGNOSIS — I25.5 ISCHEMIC CARDIOMYOPATHY: ICD-10-CM

## 2023-09-14 NOTE — TELEPHONE ENCOUNTER
empagliflozin (JARDIANCE) 10 MG TABS tablet   90 tablet 1 3/16/2023       Last Office Visit : 3-  Future Office visit:  none    Sodium Glucose Co-Transport Inhibitor Agents Upetns8809/14/2023 09:38 AM   Protocol Details Recent (6 mo) or future (30 days) visit within the authorizing provider's

## 2024-03-16 ENCOUNTER — HEALTH MAINTENANCE LETTER (OUTPATIENT)
Age: 59
End: 2024-03-16

## 2024-03-26 ENCOUNTER — DOCUMENTATION ONLY (OUTPATIENT)
Dept: SLEEP MEDICINE | Facility: CLINIC | Age: 59
End: 2024-03-26
Payer: COMMERCIAL

## 2024-03-26 DIAGNOSIS — G47.33 OSA ON CPAP: Primary | ICD-10-CM

## 2024-03-27 ASSESSMENT — SLEEP AND FATIGUE QUESTIONNAIRES
HOW LIKELY ARE YOU TO NOD OFF OR FALL ASLEEP WHEN YOU ARE A PASSENGER IN A CAR FOR AN HOUR WITHOUT A BREAK: WOULD NEVER DOZE
HOW LIKELY ARE YOU TO NOD OFF OR FALL ASLEEP IN A CAR, WHILE STOPPED FOR A FEW MINUTES IN TRAFFIC: WOULD NEVER DOZE
HOW LIKELY ARE YOU TO NOD OFF OR FALL ASLEEP WHILE WATCHING TV: WOULD NEVER DOZE
HOW LIKELY ARE YOU TO NOD OFF OR FALL ASLEEP WHILE SITTING AND TALKING TO SOMEONE: WOULD NEVER DOZE
HOW LIKELY ARE YOU TO NOD OFF OR FALL ASLEEP WHILE SITTING AND READING: SLIGHT CHANCE OF DOZING
HOW LIKELY ARE YOU TO NOD OFF OR FALL ASLEEP WHILE SITTING INACTIVE IN A PUBLIC PLACE: WOULD NEVER DOZE
HOW LIKELY ARE YOU TO NOD OFF OR FALL ASLEEP WHILE LYING DOWN TO REST IN THE AFTERNOON WHEN CIRCUMSTANCES PERMIT: SLIGHT CHANCE OF DOZING
HOW LIKELY ARE YOU TO NOD OFF OR FALL ASLEEP WHILE SITTING QUIETLY AFTER LUNCH WITHOUT ALCOHOL: WOULD NEVER DOZE

## 2024-03-27 NOTE — PROGRESS NOTES
Patient looking to get new CPAP supplies.  Scheduled a virtual visit with her Sleep Provider tomorrow.

## 2024-03-28 ENCOUNTER — VIRTUAL VISIT (OUTPATIENT)
Dept: SLEEP MEDICINE | Facility: CLINIC | Age: 59
End: 2024-03-28
Payer: COMMERCIAL

## 2024-03-28 VITALS — BODY MASS INDEX: 29.16 KG/M2 | HEIGHT: 65 IN | WEIGHT: 175 LBS

## 2024-03-28 DIAGNOSIS — G47.33 OSA (OBSTRUCTIVE SLEEP APNEA): Primary | ICD-10-CM

## 2024-03-28 PROCEDURE — 99203 OFFICE O/P NEW LOW 30 MIN: CPT | Mod: 95 | Performed by: INTERNAL MEDICINE

## 2024-03-28 ASSESSMENT — PAIN SCALES - GENERAL: PAINLEVEL: NO PAIN (0)

## 2024-03-28 NOTE — PATIENT INSTRUCTIONS
Your Body mass index is 29.57 kg/m .  Weight management is a personal decision.  If you are interested in exploring weight loss strategies, the following discussion covers the approaches that may be successful. Body mass index (BMI) is one way to tell whether you are at a healthy weight, overweight, or obese. It measures your weight in relation to your height.  A BMI of 18.5 to 24.9 is in the healthy range. A person with a BMI of 25 to 29.9 is considered overweight, and someone with a BMI of 30 or greater is considered obese. More than two-thirds of American adults are considered overweight or obese.  Being overweight or obese increases the risk for further weight gain. Excess weight may lead to heart disease and diabetes.  Creating and following plans for healthy eating and physical activity may help you improve your health.  Weight control is part of healthy lifestyle and includes exercise, emotional health, and healthy eating habits. Careful eating habits lifelong are the mainstay of weight control. Though there are significant health benefits from weight loss, long-term weight loss with diet alone may be very difficult to achieve- studies show long-term success with dietary management in less than 10% of people. Attaining a healthy weight may be especially difficult to achieve in those with severe obesity. In some cases, medications, devices and surgical management might be considered.  What can you do?  If you are overweight or obese and are interested in methods for weight loss, you should discuss this with your provider.   Consider reducing daily calorie intake by 500 calories.   Keep a food journal.   Avoiding skipping meals, consider cutting portions instead.    Diet combined with exercise helps maintain muscle while optimizing fat loss. Strength training is particularly important for building and maintaining muscle mass. Exercise helps reduce stress, increase energy, and improves fitness. Increasing  exercise without diet control, however, may not burn enough calories to loose weight.     Start walking three days a week 10-20 minutes at a time  Work towards walking thirty minutes five days a week   Eventually, increase the speed of your walking for 1-2 minutes at time    In addition, we recommend that you review healthy lifestyles and methods for weight loss available through the National Institutes of Health patient information sites:  http://win.niddk.nih.gov/publications/index.htm    And look into health and wellness programs that may be available through your health insurance provider, employer, local community center, or shon club.

## 2024-03-28 NOTE — NURSING NOTE
Has patient had flu shot for current/most recent flu season? If so, when? No     Is the patient currently in the state of [place]? YES    Visit mode:VIDEO    If the visit is dropped, the patient can be reconnected by: VIDEO VISIT: Send to e-mail at: [email or web address] with [place]    Will anyone else be joining the visit? NO  (If patient encounters technical issues they should call [phone]    How would you like to obtain your AVS? MyChart    Are changes needed to the allergy or medication list? Pt stated no med changes    Reason for visit: RECHECK    No other vitals to report per pt    Marlen WHITEHEADF

## 2024-03-28 NOTE — PROGRESS NOTES
Virtual Visit Details    Type of service:  Video Visit     Originating Location (pt. Location): Home    Distant Location (provider location):  On-site  Platform used for Video Visit: Herminio    Obstructive Sleep Apnea - PAP Follow-Up Visit:    Chief Complaint   Patient presents with    RECHECK       Jesenia Fofana comes in today for follow-up of their severe sleep apnea, managed with CPAP.     HST on 2/8/2021 at weight of 194 lbs showed an AHI of 73.8 per hour. AHI was 73 per hour supine, 0 per hour prone, 65.3 per hour on left side, and 85 per hour on right side. Baseline oxygen saturation was 99.5%.  Time with saturation less than or equal to 88% was 64.3 minutes. The lowest oxygen saturation was 65%.     Do you use a CPAP Machine at home: Yes  Overall, on a scale of 0-10 how would you rate your CPAP (0 poor, 10 great): 9  Is your mask comfortable: Yes  If not, why:    Is you mask leaking: No  If yes, where do you feel it:    How many night per week does the mask leak (0-7):    Do you notice snoring with mask on: No  Do you notice gasping arousals with mask on: No  Are you having significant oral or nasal dryness: No  Is the pressure setting comfortable: Yes  In not, why:    What type of mask do you use: Nasal Mask  What is your typical bedtime: 10:30 - 11:00  How long does it take you to go to sleep on PAP therapy: 15 minutes  What time do you typically get out of bed for the day: 6:00 AM  How many hours on average per night are you using PAP therapy: 7 hours  How many hours are you sleeping per night: 7 hours  Do you feel well rested in the morning: Yes    ResMed     Auto-PAP 5-15 cmH2O download:  30/30 total days of use. 0 nonuse days. 100% days with >4 hours use.  Average use 7 hours 21 minutes per day. Median Leak 0.5 L/min. 95%ile Leak 11.6 L/min. CPAP 95% pressure 10.8 cm. AHI 1.2    EPWORTH SLEEPINESS SCALE         3/27/2024    11:11 AM    [place] Sleepiness Scale ( M.W. Kim  [id number]<br>ESS -  "[place]/English - Final version - 21 Nov 07 - [facility] [place].)   Sitting and reading Slight chance of dozing   Watching TV Would never doze   Sitting, inactive in a public place (e.g. a theatre or a meeting) Would never doze   As a passenger in a car for an hour without a break Would never doze   Lying down to rest in the afternoon when circumstances permit Slight chance of dozing   Sitting and talking to someone Would never doze   Sitting quietly after a lunch without alcohol Would never doze   In a car, while stopped for a few minutes in traffic Would never doze   [place] Score (MC) 2   [place] Score (Sleep) 2       INSOMNIA SEVERITY INDEX (JEANNINE)          3/27/2024    11:04 AM   Insomnia Severity Index (JEANNINE)   Difficulty falling asleep 1   Difficulty staying asleep 1   Problems waking up too early 0   How SATISFIED/DISSATISFIED are you with your CURRENT sleep pattern? 1   How NOTICEABLE to others do you think your sleep problem is in terms of impairing the quality of your life? 0   How WORRIED/DISTRESSED are you about your current sleep problem? 0   To what extent do you consider your sleep problem to INTERFERE with your daily functioning (e.g. daytime fatigue, mood, ability to function at work/daily chores, concentration, memory, mood, etc.) CURRENTLY? 0   JEANNINE Total Score 3       Guidelines for Scoring/Interpretation:  Total score categories:  0-7 = No clinically significant insomnia   8-14 = Subthreshold insomnia   15-21 = Clinical insomnia (moderate severity)  22-28 = Clinical insomnia (severe)  Used via courtesy of www.[employer].va.gov with permission from Jori Johansen PhD., [facility] 1.638 m (5' 4.5\")   Wt 79.4 kg (175 lb)   LMP 04/25/2011   BMI 29.57 kg/m      Impression/Plan:     Severe obstructive sleep apnea.     - Patient is using CPAP regularly and continuing to benefit from therapy. Patient reports that CPAP therapy has been life changing for her. I reviewed download data and " graphs from her device for last 30 days. Regular compliance and normal residual AHI is demonstrated confirming adequate treatment of sleep apnea.     Plan:     Continue auto PAP therapy 5-15 cm H2O    Jesenia Fofana will follow up in about 1 year(s).       Wayne Martinez MD    CC:  Edgardo Mckeon,

## 2024-04-18 ENCOUNTER — LAB (OUTPATIENT)
Dept: LAB | Facility: CLINIC | Age: 59
End: 2024-04-18
Payer: COMMERCIAL

## 2024-04-18 ENCOUNTER — HOSPITAL ENCOUNTER (OUTPATIENT)
Dept: CARDIOLOGY | Facility: CLINIC | Age: 59
Discharge: HOME | End: 2024-04-18
Attending: INTERNAL MEDICINE | Admitting: INTERNAL MEDICINE
Payer: COMMERCIAL

## 2024-04-18 ENCOUNTER — OFFICE VISIT (OUTPATIENT)
Dept: CARDIOLOGY | Facility: CLINIC | Age: 59
End: 2024-04-18
Payer: COMMERCIAL

## 2024-04-18 VITALS
DIASTOLIC BLOOD PRESSURE: 68 MMHG | HEIGHT: 65 IN | BODY MASS INDEX: 29.81 KG/M2 | WEIGHT: 178.9 LBS | HEART RATE: 64 BPM | SYSTOLIC BLOOD PRESSURE: 104 MMHG

## 2024-04-18 DIAGNOSIS — E78.5 HYPERLIPIDEMIA LDL GOAL <100: ICD-10-CM

## 2024-04-18 DIAGNOSIS — R94.39 ABNORMAL CARDIOVASCULAR STRESS TEST: ICD-10-CM

## 2024-04-18 DIAGNOSIS — E66.811 CLASS 1 OBESITY DUE TO EXCESS CALORIES WITH SERIOUS COMORBIDITY AND BODY MASS INDEX (BMI) OF 31.0 TO 31.9 IN ADULT: ICD-10-CM

## 2024-04-18 DIAGNOSIS — R00.2 PALPITATIONS: ICD-10-CM

## 2024-04-18 DIAGNOSIS — R73.9 ELEVATED BLOOD SUGAR: Primary | ICD-10-CM

## 2024-04-18 DIAGNOSIS — R73.9 ELEVATED BLOOD SUGAR: ICD-10-CM

## 2024-04-18 DIAGNOSIS — I25.5 ISCHEMIC CARDIOMYOPATHY: ICD-10-CM

## 2024-04-18 DIAGNOSIS — E66.09 CLASS 1 OBESITY DUE TO EXCESS CALORIES WITH SERIOUS COMORBIDITY AND BODY MASS INDEX (BMI) OF 31.0 TO 31.9 IN ADULT: ICD-10-CM

## 2024-04-18 DIAGNOSIS — Q24.5 ALCAPA (ANOMALOUS LEFT CORONARY ARTERY FROM THE PULMONARY ARTERY): ICD-10-CM

## 2024-04-18 LAB
BI-PLANE LVEF ECHO: NORMAL
HBA1C MFR BLD: 5.4 %

## 2024-04-18 PROCEDURE — 999N000208 ECHOCARDIOGRAM COMPLETE

## 2024-04-18 PROCEDURE — 255N000002 HC RX 255 OP 636: Performed by: INTERNAL MEDICINE

## 2024-04-18 PROCEDURE — 93306 TTE W/DOPPLER COMPLETE: CPT | Mod: 26 | Performed by: INTERNAL MEDICINE

## 2024-04-18 PROCEDURE — 83036 HEMOGLOBIN GLYCOSYLATED A1C: CPT | Performed by: INTERNAL MEDICINE

## 2024-04-18 PROCEDURE — 36415 COLL VENOUS BLD VENIPUNCTURE: CPT | Performed by: INTERNAL MEDICINE

## 2024-04-18 PROCEDURE — 99214 OFFICE O/P EST MOD 30 MIN: CPT | Mod: 25 | Performed by: INTERNAL MEDICINE

## 2024-04-18 RX ADMIN — HUMAN ALBUMIN MICROSPHERES AND PERFLUTREN 3 ML: 10; .22 INJECTION, SOLUTION INTRAVENOUS at 09:22

## 2024-04-18 NOTE — LETTER
4/18/2024    Edgardo Mckeon MD  [address]  [place] [address]    RE: Jesenia Fofana       Dear Colleague,     I had the pleasure of seeing Jesenia Fofana in the [facility].  CARDIOLOGY CONSULTATION:    Ms. Fofana is a delightful, 58-year-old woman with a history of ALCAPA; please see my previous note from January 2019 for details.  I ended up cathing her given she had some chest pressure with exertion, and she had a positive nuclear stress test in January 2019.  The cath showed that she had a ligated left main, and then they plugged a LIMA into the mid LAD.  She has an aneurysm at the implantation site of the LIMA in her LAD, and she has collaterals from her massive right coronary artery.   Her coronary artery is between 9-11 mm, so quite enlarged, but there is nothing that would need intervention or a surgery could repair.  She is on anticoagulation now with Eliquis.     Since I last saw her she has been feeling well.  Her A1C is down from 5.9 to 5.4.  Echo from 4/2024 is stable with EF around 40% and apical anterior akinesis that is unchanged. She is very active and exercising - she bought a treadmill!    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    abnl left coronary artery defect    Sleep apnea     cpap       CURRENT MEDICATIONS:  Current Outpatient Medications   Medication Sig Dispense Refill    acetaminophen (TYLENOL) 325 MG tablet Take 2 tablets (650 mg) by mouth every 4 hours as needed for other (mild pain) 100 tablet 0    amoxicillin (AMOXIL) 500 MG tablet Take 2,000 mg by mouth daily as needed (1 HOUR PRIOR TO DENTAL VISITS)      atorvastatin (LIPITOR) 20 MG tablet Take 1 tablet (20 mg) by mouth every evening 90 tablet 3    clobetasol (TEMOVATE) 0.05 % external ointment Apply topically 2 times daily  as needed (hand eczema) 45 g 0    empagliflozin (JARDIANCE) 10 MG TABS tablet Take 1 tablet (10 mg) by mouth daily 90 tablet 3    metoprolol succinate ER (TOPROL XL) 25 MG 24 hr tablet Take 1 tablet (25 mg) by mouth daily 90 tablet 3    naproxen sodium (ANAPROX) 220 MG tablet Take 220 mg by mouth daily      Psyllium (METAMUCIL PO) Take 1 tablet by mouth every evening      rivaroxaban ANTICOAGULANT (XARELTO ANTICOAGULANT) 20 MG TABS tablet Take 1 tablet (20 mg) by mouth daily (with dinner) 90 tablet 3    semaglutide (OZEMPIC) 2 MG/1.5ML SOPN pen Inject 0.25 mg Subcutaneous every 7 days 1 mL 0    semaglutide (OZEMPIC) 2 MG/1.5ML SOPN pen Inject 0.5 mg Subcutaneous every 7 days 2 mL 0    Semaglutide, 1 MG/DOSE, (OZEMPIC) 4 MG/3ML pen Inject 1 mg Subcutaneous every 7 days 3 mL 0    Semaglutide, 2 MG/DOSE, (OZEMPIC) 8 MG/3ML pen Inject 2 mg Subcutaneous every 7 days 9 mL 3    Simethicone (GAS-X PO) Take 1 tablet by mouth daily as needed for intestinal gas         PAST SURGICAL HISTORY:  Past Surgical History:   Procedure Laterality Date    ARTHROPLASTY KNEE Left 5/24/2021    Procedure: left total knee arthroplasty;  Surgeon: Anseth, Scott Duane, MD;  Location:  OR    CARDIAC SURGERY      CABG    CV RIGHT HEART CATH MEASUREMENTS RECORDED N/A 3/7/2019    Procedure: RHC with Coronay Angiogram;  Surgeon: Shikha Wilson MD;  Location:  HEART CARDIAC CATH LAB    ORTHOPEDIC SURGERY      right total knee    ZZC NONSPECIFIC PROCEDURE  1997    left coronary artery revision       ALLERGIES  Aspirin and Ibuprofen    FAMILY HX:  Family History   Problem Relation Age of Onset    Heart Disease Maternal Grandfather         MI    Cancer - colorectal Paternal Grandfather     Diabetes Maternal Uncle     Thyroid Disease Sister         hypothyroidism    Neurologic Disorder Sister         MS diagnosed at 40y.o.       SOCIAL HX:  Social History     Socioeconomic History    Marital status:      Spouse name: Adolfo Fink  "of children: 2    Years of education: 14 years   Occupational History    Occupation: management     Employer: [employer]     Comment: [employer]   Tobacco Use    Smoking status: Former     Current packs/day: 0.00     Average packs/day: 1.5 packs/day for 10.0 years (15.0 ttl pk-yrs)     Types: Cigarettes     Start date: 1981     Quit date: 1991     Years since quittin.8     Passive exposure: Past (per pt)    Smokeless tobacco: Never   Vaping Use    Vaping status: Never Used   Substance and Sexual Activity    Alcohol use: Yes     Alcohol/week: 0.0 standard drinks of alcohol     Comment: socially    Drug use: No    Sexual activity: Yes     Partners: Male   Other Topics Concern     Service No    Blood Transfusions No    Caffeine Concern No    Occupational Exposure Yes     Comment: work in biomedical    Hobby Hazards No    Sleep Concern No    Stress Concern No    Weight Concern Yes     Comment: getting liposuction    Special Diet No    Exercise No    Bike Helmet No    Seat Belt Yes    Self-Exams Yes     Comment: sometimes       ROS:  Constitutional: No fever, chills, or sweats. No weight gain/loss.   ENT: No visual disturbance, ear ache, epistaxis, sore throat.   Allergies/Immunologic: Negative.   Respiratory: No cough, hemoptysis.   Cardiovascular: As per HPI.   GI: No nausea, vomiting, hematemesis, melena, or hematochezia.   : No urinary frequency, dysuria, or hematuria.   Integument: Negative.   Psychiatric: Negative.   Neuro: Negative.   Endocrinology: Negative.   Musculoskeletal: No myalgia.    VITAL SIGNS:  /68   Pulse 64   Ht 1.638 m (5' 4.5\")   Wt 81.1 kg (178 lb 14.4 oz)   LMP 2011   BMI 30.23 kg/m    Body mass index is 30.23 kg/m .  Wt Readings from Last 2 Encounters:   24 81.1 kg (178 lb 14.4 oz)   24 79.4 kg (175 lb)       PHYSICAL EXAM  Jesenia Fofana IS A 58 year old female.in no acute distress.  HEENT: Unremarkable.  Neck: JVP " "normal.   Lungs: CTA.  Cor: RRR. Normal S1 and S2.  No murmur  Abd: Soft.  Extremities: No C/C/E.     LABS    Lab Results   Component Value Date    WBC 6.8 03/16/2023    WBC 6.2 03/07/2019     Lab Results   Component Value Date    RBC 5.15 03/16/2023    RBC 4.67 03/07/2019     Lab Results   Component Value Date    HGB 15.3 03/16/2023    HGB 13.0 05/25/2021     Lab Results   Component Value Date    HCT 45.3 03/16/2023    HCT 40.5 03/07/2019     No components found for: \"MCT\"  Lab Results   Component Value Date    MCV 88 03/16/2023    MCV 87 03/07/2019     Lab Results   Component Value Date    MCH 29.7 03/16/2023    MCH 29.6 03/07/2019     Lab Results   Component Value Date    MCHC 33.8 03/16/2023    MCHC 34.1 03/07/2019     Lab Results   Component Value Date    RDW 11.7 03/16/2023    RDW 12.1 03/07/2019     Lab Results   Component Value Date     03/16/2023     05/25/2021      Recent Labs   Lab Test 05/19/23  1340 03/16/23  0953    142   POTASSIUM 4.8 4.5   CHLORIDE 106 104   CO2 26 28   ANIONGAP 11 10   * 115*   BUN 12.4 10.1   CR 0.74 0.67   JOSEPHINE 9.3 9.2     Recent Labs   Lab Test 03/16/23  0953 09/10/20  0910   CHOL 160 146   HDL 31* 49*   LDL 76 72   TRIG 263* 125   NHDL 129 97        IMPRESSION, REPORT AND PLAN:   1.  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 Norris with repair, including ligation of the left main and a LIMA to the LAD.   2. Ejection fraction of 40%-45%.   3.  Coronary artery aneurysm involving the mid LAD and the right coronary artery on anticoagulation.   4.  History of thyrotoxicosis.   5. S/P bilateral knee replacement  6. BMI 30, down from 32  7. Elevated blood sugar - last A1C 5.4     DISCUSSION:  It was a pleasure to see Ms. Fofana in followup.  Clinically, she is doing well.   BP is well controlled. She has lost weight in the past year, BMI down from 32 to 30.  Her A1C is improved, now 5.4.    She is " tolerating anticoagulation indicated in the setting of CA aneurysms; this also is helpful with severe apical wall motion abnormality.      We will plan to see her back in a year with an A1C or sooner if there are any issues in the interim.  She understands and is in agreement with the plan as outlined.       It was a pleasure to see her.  Please do not hesitate to contact me with any questions or concerns.         MARCIAL WILSON MD   31 minutes face-face, documentation and review of records on day of visit       Thank you for allowing me to participate in the care of your patient.      Sincerely,     Marcial Wilson MD     [facility]  cc:   Marcial Wilson MD  [address] [facility] [address] [place] [id number]

## 2024-04-18 NOTE — PATIENT INSTRUCTIONS
Thank you for visiting the Adult Congenital and Cardiovascular Genetics Clinic at the [facility].    Cardiology Providers you saw during your visit:  ASIA Wilson MD    Diagnosis:  anomalous coronary arteries s/p repair     Results:  ASIA Wilson MD reviewed the results of your EKG, echo and lab testing today in clinic.    ______________________________________________________________________________    Recommendations from your Cardiology Provider:    No change today      General Cardiac Recommendations:  Continue to eat a heart healthy, low salt diet.  Continue to get 20-30 minutes of aerobic activity, 4-5 days per week.  Examples of aerobic activity include walking, running, swimming, cycling, etc.  Continue to observe good oral hygiene, with regular dental visits.    ____________________________________________________________________________________________________    SBE prophylaxis:   Yes____  No__X__    SBE prophylaxis applies to patients with certain heart conditions who are recommended to take antibiotics before dental appointments and other specific procedures. These antibiotics are to help prevent an infection of the heart (endocarditis) that certain patients are at higher risk of developing. The guidelines used come from the American Heart Association and are periodically updated.    If YES is checked, follow the recommendations outlined below:  Take antibiotic(s) prior to recommended dental procedures and procedures involving the respiratory tract or procedures involving infections of the skin, muscle or bones.   SBE prophylaxis is not needed for routine gastrointestinal and genitourinary procedures (ie. Colonoscopy or vaginal delivery)  Observe good oral hygiene daily, as advised by your dentist. Get regular professional dental care.  Keep cuts and other open injuries clean.  All infections should be treated as soon as possible.  Symptoms of Infective Endocarditis could include:  fever lasting more than 4-5 days or a recurrent fever that initially resolves but returns within 1-2 days). Call us a [phone] if you are experiencing these symptoms.  ____________________________________________________________________________________________________    FASTING CHOLESTEROL was checked in the last 5 years YES__X__  NO____ (2023)  If no, please follow up with your primary care physician. You should have a cholesterol screening every 5 years at minimum, and every year if taking a medication for your cholesterol levels.     ____________________________________________________________________________________________________    Follow-up Plan:  Follow up with Dr Wilson in 1 year with a hemoglobin A1c prior    ____________________________________________________________________________________________________    If you have questions or concerns, please call us at [phone] or contact us through [employer].    Joanie Polk RN, BSN    Telma Grijalva (Scheduling)  Nurse Care Coordinator     Clinic   Adult Congenital and CV Genetics   Adult Congenital and CV Genetic  [facility]   [facility]  (P) [phone]     (P) [phone]  (F) [phone]     (F) [phone]      For after hours urgent needs, call [phone] and ask to speak to the Adult Congenital Physician on call.  Mention Job Code 0401.    For emergencies call 911.    [facility]  [facility]  Mail Code [id number]  [address] [place]  [address]

## 2024-04-18 NOTE — PROGRESS NOTES
CARDIOLOGY CONSULTATION:    Ms. Fofana is a delightful, 58-year-old woman with a history of ALCAPA; please see my previous note from January 2019 for details.  I ended up cathing her given she had some chest pressure with exertion, and she had a positive nuclear stress test in January 2019.  The cath showed that she had a ligated left main, and then they plugged a LIMA into the mid LAD.  She has an aneurysm at the implantation site of the LIMA in her LAD, and she has collaterals from her massive right coronary artery.   Her coronary artery is between 9-11 mm, so quite enlarged, but there is nothing that would need intervention or a surgery could repair.  She is on anticoagulation now with Eliquis.     Since I last saw her she has been feeling well.  Her A1C is down from 5.9 to 5.4.  Echo from 4/2024 is stable with EF around 40% and apical anterior akinesis that is unchanged. She is very active and exercising - she bought a treadmill!    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    abnl left coronary artery defect    Sleep apnea     cpap       CURRENT MEDICATIONS:  Current Outpatient Medications   Medication Sig Dispense Refill    acetaminophen (TYLENOL) 325 MG tablet Take 2 tablets (650 mg) by mouth every 4 hours as needed for other (mild pain) 100 tablet 0    amoxicillin (AMOXIL) 500 MG tablet Take 2,000 mg by mouth daily as needed (1 HOUR PRIOR TO DENTAL VISITS)      atorvastatin (LIPITOR) 20 MG tablet Take 1 tablet (20 mg) by mouth every evening 90 tablet 3    clobetasol (TEMOVATE) 0.05 % external ointment Apply topically 2 times daily as needed (hand eczema) 45 g 0    empagliflozin (JARDIANCE) 10 MG TABS tablet Take 1 tablet (10 mg) by mouth daily 90 tablet 3    metoprolol succinate ER (TOPROL XL) 25 MG 24 hr tablet Take 1 tablet (25  mg) by mouth daily 90 tablet 3    naproxen sodium (ANAPROX) 220 MG tablet Take 220 mg by mouth daily      Psyllium (METAMUCIL PO) Take 1 tablet by mouth every evening      rivaroxaban ANTICOAGULANT (XARELTO ANTICOAGULANT) 20 MG TABS tablet Take 1 tablet (20 mg) by mouth daily (with dinner) 90 tablet 3    semaglutide (OZEMPIC) 2 MG/1.5ML SOPN pen Inject 0.25 mg Subcutaneous every 7 days 1 mL 0    semaglutide (OZEMPIC) 2 MG/1.5ML SOPN pen Inject 0.5 mg Subcutaneous every 7 days 2 mL 0    Semaglutide, 1 MG/DOSE, (OZEMPIC) 4 MG/3ML pen Inject 1 mg Subcutaneous every 7 days 3 mL 0    Semaglutide, 2 MG/DOSE, (OZEMPIC) 8 MG/3ML pen Inject 2 mg Subcutaneous every 7 days 9 mL 3    Simethicone (GAS-X PO) Take 1 tablet by mouth daily as needed for intestinal gas         PAST SURGICAL HISTORY:  Past Surgical History:   Procedure Laterality Date    ARTHROPLASTY KNEE Left 5/24/2021    Procedure: left total knee arthroplasty;  Surgeon: Anseth, Scott Duane, MD;  Location:  OR    CARDIAC SURGERY      CABG    CV RIGHT HEART CATH MEASUREMENTS RECORDED N/A 3/7/2019    Procedure: RHC with Coronay Angiogram;  Surgeon: Shikha Wilson MD;  Location:  HEART CARDIAC CATH LAB    ORTHOPEDIC SURGERY      right total knee    ZZC NONSPECIFIC PROCEDURE  1997    left coronary artery revision       ALLERGIES  Aspirin and Ibuprofen    FAMILY HX:  Family History   Problem Relation Age of Onset    Heart Disease Maternal Grandfather         MI    Cancer - colorectal Paternal Grandfather     Diabetes Maternal Uncle     Thyroid Disease Sister         hypothyroidism    Neurologic Disorder Sister         MS diagnosed at 40y.o.       SOCIAL HX:  Social History     Socioeconomic History    Marital status:      Spouse name: Adolfo    Number of children: 2    Years of education: 14 years   Occupational History    Occupation: management     Employer: [employer]     Comment: [employer]   Tobacco Use    Smoking status:  "Former     Current packs/day: 0.00     Average packs/day: 1.5 packs/day for 10.0 years (15.0 ttl pk-yrs)     Types: Cigarettes     Start date: 1981     Quit date: 1991     Years since quittin.8     Passive exposure: Past (per pt)    Smokeless tobacco: Never   Vaping Use    Vaping status: Never Used   Substance and Sexual Activity    Alcohol use: Yes     Alcohol/week: 0.0 standard drinks of alcohol     Comment: socially    Drug use: No    Sexual activity: Yes     Partners: Male   Other Topics Concern     Service No    Blood Transfusions No    Caffeine Concern No    Occupational Exposure Yes     Comment: work in [employer] INRFOOD No    Sleep Concern No    Stress Concern No    Weight Concern Yes     Comment: getting liposuction    Special Diet No    Exercise No    Bike Helmet No    Seat Belt Yes    Self-Exams Yes     Comment: sometimes       ROS:  Constitutional: No fever, chills, or sweats. No weight gain/loss.   ENT: No visual disturbance, ear ache, epistaxis, sore throat.   Allergies/Immunologic: Negative.   Respiratory: No cough, hemoptysis.   Cardiovascular: As per HPI.   GI: No nausea, vomiting, hematemesis, melena, or hematochezia.   : No urinary frequency, dysuria, or hematuria.   Integument: Negative.   Psychiatric: Negative.   Neuro: Negative.   Endocrinology: Negative.   Musculoskeletal: No myalgia.    VITAL SIGNS:  /68   Pulse 64   Ht 1.638 m (5' 4.5\")   Wt 81.1 kg (178 lb 14.4 oz)   LMP 2011   BMI 30.23 kg/m    Body mass index is 30.23 kg/m .  Wt Readings from Last 2 Encounters:   24 81.1 kg (178 lb 14.4 oz)   24 79.4 kg (175 lb)       PHYSICAL EXAM  Jesenia Fofana IS A 58 year old female.in no acute distress.  HEENT: Unremarkable.  Neck: JVP normal.   Lungs: CTA.  Cor: RRR. Normal S1 and S2.  No murmur  Abd: Soft.  Extremities: No C/C/E.     LABS    Lab Results   Component Value Date    WBC 6.8 2023    WBC 6.2 2019     Lab Results " "  Component Value Date    RBC 5.15 03/16/2023    RBC 4.67 03/07/2019     Lab Results   Component Value Date    HGB 15.3 03/16/2023    HGB 13.0 05/25/2021     Lab Results   Component Value Date    HCT 45.3 03/16/2023    HCT 40.5 03/07/2019     No components found for: \"MCT\"  Lab Results   Component Value Date    MCV 88 03/16/2023    MCV 87 03/07/2019     Lab Results   Component Value Date    MCH 29.7 03/16/2023    MCH 29.6 03/07/2019     Lab Results   Component Value Date    MCHC 33.8 03/16/2023    MCHC 34.1 03/07/2019     Lab Results   Component Value Date    RDW 11.7 03/16/2023    RDW 12.1 03/07/2019     Lab Results   Component Value Date     03/16/2023     05/25/2021      Recent Labs   Lab Test 05/19/23  1340 03/16/23  0953    142   POTASSIUM 4.8 4.5   CHLORIDE 106 104   CO2 26 28   ANIONGAP 11 10   * 115*   BUN 12.4 10.1   CR 0.74 0.67   JOSEPHINE 9.3 9.2     Recent Labs   Lab Test 03/16/23  0953 09/10/20  0910   CHOL 160 146   HDL 31* 49*   LDL 76 72   TRIG 263* 125   NHDL 129 97        IMPRESSION, REPORT AND PLAN:   1.  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 Norris with repair, including ligation of the left main and a LIMA to the LAD.   2. Ejection fraction of 40%-45%.   3.  Coronary artery aneurysm involving the mid LAD and the right coronary artery on anticoagulation.   4.  History of thyrotoxicosis.   5. S/P bilateral knee replacement  6. BMI 30, down from 32  7. Elevated blood sugar - last A1C 5.4     DISCUSSION:  It was a pleasure to see Ms. Fofana in followup.  Clinically, she is doing well.   BP is well controlled. She has lost weight in the past year, BMI down from 32 to 30.  Her A1C is improved, now 5.4.    She is tolerating anticoagulation indicated in the setting of CA aneurysms; this also is helpful with severe apical wall motion abnormality.      We will plan to see her back in a year with an A1C or sooner if there " are any issues in the interim.  She understands and is in agreement with the plan as outlined.       It was a pleasure to see her.  Please do not hesitate to contact me with any questions or concerns.         MARCIAL CRAIG MD   31 minutes face-face, documentation and review of records on day of visit

## 2024-04-27 DIAGNOSIS — E66.09 CLASS 1 OBESITY DUE TO EXCESS CALORIES WITH SERIOUS COMORBIDITY AND BODY MASS INDEX (BMI) OF 31.0 TO 31.9 IN ADULT: ICD-10-CM

## 2024-04-27 DIAGNOSIS — I25.5 ISCHEMIC CARDIOMYOPATHY: ICD-10-CM

## 2024-04-27 DIAGNOSIS — E66.811 CLASS 1 OBESITY DUE TO EXCESS CALORIES WITH SERIOUS COMORBIDITY AND BODY MASS INDEX (BMI) OF 31.0 TO 31.9 IN ADULT: ICD-10-CM

## 2024-04-27 DIAGNOSIS — R73.9 ELEVATED BLOOD SUGAR: ICD-10-CM

## 2024-05-23 DIAGNOSIS — Q24.5 ALCAPA (ANOMALOUS LEFT CORONARY ARTERY FROM THE PULMONARY ARTERY): ICD-10-CM

## 2024-05-23 DIAGNOSIS — E78.5 HYPERLIPIDEMIA LDL GOAL <100: ICD-10-CM

## 2024-05-23 DIAGNOSIS — R94.39 ABNORMAL CARDIOVASCULAR STRESS TEST: ICD-10-CM

## 2024-05-24 RX ORDER — ATORVASTATIN CALCIUM 20 MG/1
20 TABLET, FILM COATED ORAL EVERY EVENING
Qty: 90 TABLET | Refills: 3 | OUTPATIENT
Start: 2024-05-24

## 2024-05-28 ENCOUNTER — MYC REFILL (OUTPATIENT)
Dept: FAMILY MEDICINE | Facility: CLINIC | Age: 59
End: 2024-05-28
Payer: COMMERCIAL

## 2024-05-28 ENCOUNTER — MYC REFILL (OUTPATIENT)
Dept: CARDIOLOGY | Facility: CLINIC | Age: 59
End: 2024-05-28
Payer: COMMERCIAL

## 2024-05-28 DIAGNOSIS — L30.9 ECZEMA, UNSPECIFIED TYPE: ICD-10-CM

## 2024-05-28 DIAGNOSIS — Q24.5 ALCAPA (ANOMALOUS LEFT CORONARY ARTERY FROM THE PULMONARY ARTERY): ICD-10-CM

## 2024-05-28 DIAGNOSIS — I25.5 ISCHEMIC CARDIOMYOPATHY: ICD-10-CM

## 2024-05-28 DIAGNOSIS — E66.811 CLASS 1 OBESITY DUE TO EXCESS CALORIES WITH SERIOUS COMORBIDITY AND BODY MASS INDEX (BMI) OF 31.0 TO 31.9 IN ADULT: ICD-10-CM

## 2024-05-28 DIAGNOSIS — R94.39 ABNORMAL CARDIOVASCULAR STRESS TEST: ICD-10-CM

## 2024-05-28 DIAGNOSIS — E78.5 HYPERLIPIDEMIA LDL GOAL <100: ICD-10-CM

## 2024-05-28 DIAGNOSIS — R00.2 PALPITATIONS: ICD-10-CM

## 2024-05-28 DIAGNOSIS — R73.9 ELEVATED BLOOD SUGAR: ICD-10-CM

## 2024-05-28 DIAGNOSIS — E66.09 CLASS 1 OBESITY DUE TO EXCESS CALORIES WITH SERIOUS COMORBIDITY AND BODY MASS INDEX (BMI) OF 31.0 TO 31.9 IN ADULT: ICD-10-CM

## 2024-05-28 RX ORDER — CLOBETASOL PROPIONATE 0.5 MG/G
OINTMENT TOPICAL 2 TIMES DAILY PRN
Qty: 45 G | Refills: 0 | Status: SHIPPED | OUTPATIENT
Start: 2024-05-28

## 2024-06-05 ENCOUNTER — MYC REFILL (OUTPATIENT)
Dept: CARDIOLOGY | Facility: CLINIC | Age: 59
End: 2024-06-05
Payer: COMMERCIAL

## 2024-06-05 DIAGNOSIS — Q24.5 ALCAPA (ANOMALOUS LEFT CORONARY ARTERY FROM THE PULMONARY ARTERY): ICD-10-CM

## 2024-06-05 DIAGNOSIS — E78.5 HYPERLIPIDEMIA LDL GOAL <100: ICD-10-CM

## 2024-06-05 DIAGNOSIS — R94.39 ABNORMAL CARDIOVASCULAR STRESS TEST: ICD-10-CM

## 2024-06-05 RX ORDER — ATORVASTATIN CALCIUM 20 MG/1
20 TABLET, FILM COATED ORAL EVERY EVENING
Qty: 90 TABLET | Refills: 3 | Status: SHIPPED | OUTPATIENT
Start: 2024-06-05

## 2024-06-05 RX ORDER — METOPROLOL SUCCINATE 25 MG/1
25 TABLET, EXTENDED RELEASE ORAL DAILY
Qty: 90 TABLET | Refills: 3 | Status: SHIPPED | OUTPATIENT
Start: 2024-06-05

## 2025-03-22 ENCOUNTER — HEALTH MAINTENANCE LETTER (OUTPATIENT)
Age: 60
End: 2025-03-22

## 2025-04-09 ENCOUNTER — MYC REFILL (OUTPATIENT)
Dept: CARDIOLOGY | Facility: CLINIC | Age: 60
End: 2025-04-09
Payer: COMMERCIAL

## 2025-04-09 DIAGNOSIS — E66.811 CLASS 1 OBESITY DUE TO EXCESS CALORIES WITH SERIOUS COMORBIDITY AND BODY MASS INDEX (BMI) OF 31.0 TO 31.9 IN ADULT: ICD-10-CM

## 2025-04-09 DIAGNOSIS — I25.5 ISCHEMIC CARDIOMYOPATHY: ICD-10-CM

## 2025-04-09 DIAGNOSIS — E66.09 CLASS 1 OBESITY DUE TO EXCESS CALORIES WITH SERIOUS COMORBIDITY AND BODY MASS INDEX (BMI) OF 31.0 TO 31.9 IN ADULT: ICD-10-CM

## 2025-04-09 DIAGNOSIS — R73.9 ELEVATED BLOOD SUGAR: ICD-10-CM

## 2025-04-12 ENCOUNTER — HEALTH MAINTENANCE LETTER (OUTPATIENT)
Age: 60
End: 2025-04-12

## 2025-04-14 ENCOUNTER — OFFICE VISIT (OUTPATIENT)
Dept: FAMILY MEDICINE | Facility: CLINIC | Age: 60
End: 2025-04-14
Payer: COMMERCIAL

## 2025-04-14 VITALS
HEART RATE: 63 BPM | WEIGHT: 169.8 LBS | RESPIRATION RATE: 16 BRPM | OXYGEN SATURATION: 96 % | HEIGHT: 64 IN | SYSTOLIC BLOOD PRESSURE: 104 MMHG | TEMPERATURE: 97.3 F | DIASTOLIC BLOOD PRESSURE: 72 MMHG | BODY MASS INDEX: 28.99 KG/M2

## 2025-04-14 DIAGNOSIS — R73.9 ELEVATED BLOOD SUGAR: ICD-10-CM

## 2025-04-14 DIAGNOSIS — I25.5 ISCHEMIC CARDIOMYOPATHY: ICD-10-CM

## 2025-04-14 DIAGNOSIS — E66.811 CLASS 1 OBESITY DUE TO EXCESS CALORIES WITH SERIOUS COMORBIDITY AND BODY MASS INDEX (BMI) OF 31.0 TO 31.9 IN ADULT: ICD-10-CM

## 2025-04-14 DIAGNOSIS — R94.39 ABNORMAL CARDIOVASCULAR STRESS TEST: ICD-10-CM

## 2025-04-14 DIAGNOSIS — E78.5 HYPERLIPIDEMIA LDL GOAL <100: ICD-10-CM

## 2025-04-14 DIAGNOSIS — N64.59 INVERSION OF LEFT NIPPLE: Primary | ICD-10-CM

## 2025-04-14 DIAGNOSIS — Q24.5 ALCAPA (ANOMALOUS LEFT CORONARY ARTERY FROM THE PULMONARY ARTERY): ICD-10-CM

## 2025-04-14 DIAGNOSIS — R00.2 PALPITATIONS: ICD-10-CM

## 2025-04-14 DIAGNOSIS — E66.09 CLASS 1 OBESITY DUE TO EXCESS CALORIES WITH SERIOUS COMORBIDITY AND BODY MASS INDEX (BMI) OF 31.0 TO 31.9 IN ADULT: ICD-10-CM

## 2025-04-14 LAB
EST. AVERAGE GLUCOSE BLD GHB EST-MCNC: 100 MG/DL
HBA1C MFR BLD: 5.1 % (ref 0–5.6)

## 2025-04-14 PROCEDURE — 1126F AMNT PAIN NOTED NONE PRSNT: CPT | Performed by: PHYSICIAN ASSISTANT

## 2025-04-14 PROCEDURE — 99213 OFFICE O/P EST LOW 20 MIN: CPT | Performed by: PHYSICIAN ASSISTANT

## 2025-04-14 PROCEDURE — 3078F DIAST BP <80 MM HG: CPT | Performed by: PHYSICIAN ASSISTANT

## 2025-04-14 PROCEDURE — 83036 HEMOGLOBIN GLYCOSYLATED A1C: CPT | Performed by: PHYSICIAN ASSISTANT

## 2025-04-14 PROCEDURE — 3074F SYST BP LT 130 MM HG: CPT | Performed by: PHYSICIAN ASSISTANT

## 2025-04-14 PROCEDURE — 36415 COLL VENOUS BLD VENIPUNCTURE: CPT | Performed by: PHYSICIAN ASSISTANT

## 2025-04-14 ASSESSMENT — PAIN SCALES - GENERAL: PAINLEVEL_OUTOF10: NO PAIN (0)

## 2025-04-14 NOTE — PROGRESS NOTES
"  Assessment & Plan     Inversion of left nipple  New inversion of left nipple for at least one year. No masses palpable on exam, last mammogram 4 years ago. Diagnostic mammogram and left breast US ordered.   - MA Diagnostic Bilateral w/ Jose Manuel  - US Breast Left Limited 1-3 Quadrants      Esperanza Deluca PA-C on 4/14/2025 at 3:17 PM      Maria E Ba is a 59 year old, presenting for the following health issues:  Breast Exam        4/14/2025     2:47 PM   Additional Questions   Roomed by [facility]     History of Present Illness       Reason for visit:  Breast exam  Symptom onset:  More than a month  Symptoms include:  Inverted nipple on left breast  Symptom intensity:  Mild  Symptom progression:  Staying the same  Had these symptoms before:  No  What makes it worse:  N/a  What makes it better:  N/a   She is taking medications regularly.      Inverted nipple left side for at least the last year, never comes out even in cold weather, etc - hasn't always been this way   No lumps felt  Last mammogram in 2021  No family history of breast cancer       Objective    /72 (BP Location: Right arm, Patient Position: Sitting, Cuff Size: Adult Large)   Pulse 63   Temp 97.3  F (36.3  C) (Temporal)   Resp 16   Ht 1.633 m (5' 4.29\")   Wt 77 kg (169 lb 12.8 oz)   LMP 04/25/2011   SpO2 96%   Breastfeeding Yes   BMI 28.88 kg/m    Body mass index is 28.88 kg/m .  Physical Exam   GENERAL: alert and no distress  BREAST: left nipple inverted, no masses. Right breast normal.   NEURO: Normal strength and tone, mentation intact and speech normal  PSYCH: mentation appears normal, affect normal/bright        Signed Electronically by: Esperanza Deluca PA-C    "

## 2025-04-25 ENCOUNTER — HOSPITAL ENCOUNTER (OUTPATIENT)
Dept: MAMMOGRAPHY | Facility: CLINIC | Age: 60
Discharge: HOME | End: 2025-04-25
Attending: PHYSICIAN ASSISTANT
Payer: COMMERCIAL

## 2025-04-25 DIAGNOSIS — N64.59 INVERSION OF LEFT NIPPLE: ICD-10-CM

## 2025-04-25 PROCEDURE — 76642 ULTRASOUND BREAST LIMITED: CPT | Mod: LT

## 2025-04-25 PROCEDURE — 77062 BREAST TOMOSYNTHESIS BI: CPT

## 2025-05-08 ENCOUNTER — DOCUMENTATION ONLY (OUTPATIENT)
Dept: SLEEP MEDICINE | Facility: CLINIC | Age: 60
End: 2025-05-08
Payer: COMMERCIAL

## 2025-05-08 DIAGNOSIS — G47.33 OBSTRUCTIVE SLEEP APNEA (ADULT) (PEDIATRIC): Primary | ICD-10-CM

## 2025-05-08 DIAGNOSIS — G47.33 OSA ON CPAP: ICD-10-CM

## 2025-06-02 ENCOUNTER — MYC REFILL (OUTPATIENT)
Dept: CARDIOLOGY | Facility: CLINIC | Age: 60
End: 2025-06-02
Payer: COMMERCIAL

## 2025-06-02 DIAGNOSIS — Q24.5 ALCAPA (ANOMALOUS LEFT CORONARY ARTERY FROM THE PULMONARY ARTERY): ICD-10-CM

## 2025-06-02 DIAGNOSIS — E78.5 HYPERLIPIDEMIA LDL GOAL <100: ICD-10-CM

## 2025-06-02 DIAGNOSIS — I25.5 ISCHEMIC CARDIOMYOPATHY: ICD-10-CM

## 2025-06-02 DIAGNOSIS — R73.9 ELEVATED BLOOD SUGAR: ICD-10-CM

## 2025-06-02 DIAGNOSIS — E66.09 CLASS 1 OBESITY DUE TO EXCESS CALORIES WITH SERIOUS COMORBIDITY AND BODY MASS INDEX (BMI) OF 31.0 TO 31.9 IN ADULT: ICD-10-CM

## 2025-06-02 DIAGNOSIS — E66.811 CLASS 1 OBESITY DUE TO EXCESS CALORIES WITH SERIOUS COMORBIDITY AND BODY MASS INDEX (BMI) OF 31.0 TO 31.9 IN ADULT: ICD-10-CM

## 2025-06-02 DIAGNOSIS — R00.2 PALPITATIONS: ICD-10-CM

## 2025-06-02 DIAGNOSIS — R94.39 ABNORMAL CARDIOVASCULAR STRESS TEST: ICD-10-CM

## 2025-06-02 RX ORDER — METOPROLOL SUCCINATE 25 MG/1
25 TABLET, EXTENDED RELEASE ORAL DAILY
Qty: 90 TABLET | Refills: 0 | Status: SHIPPED | OUTPATIENT
Start: 2025-06-02

## 2025-08-27 DIAGNOSIS — R94.39 ABNORMAL CARDIOVASCULAR STRESS TEST: ICD-10-CM

## 2025-08-27 DIAGNOSIS — Q24.5 ALCAPA (ANOMALOUS LEFT CORONARY ARTERY FROM THE PULMONARY ARTERY): ICD-10-CM

## 2025-08-27 DIAGNOSIS — E78.5 HYPERLIPIDEMIA LDL GOAL <100: ICD-10-CM

## 2025-09-02 RX ORDER — ATORVASTATIN CALCIUM 20 MG/1
20 TABLET, FILM COATED ORAL EVERY EVENING
Qty: 90 TABLET | Refills: 0 | Status: SHIPPED | OUTPATIENT
Start: 2025-09-02

## 2025-09-03 DIAGNOSIS — E78.5 HYPERLIPIDEMIA LDL GOAL <100: ICD-10-CM

## 2025-09-03 DIAGNOSIS — R00.2 PALPITATIONS: ICD-10-CM

## 2025-09-03 DIAGNOSIS — R73.9 ELEVATED BLOOD SUGAR: ICD-10-CM

## 2025-09-03 DIAGNOSIS — R94.39 ABNORMAL CARDIOVASCULAR STRESS TEST: ICD-10-CM

## 2025-09-03 DIAGNOSIS — E66.09 CLASS 1 OBESITY DUE TO EXCESS CALORIES WITH SERIOUS COMORBIDITY AND BODY MASS INDEX (BMI) OF 31.0 TO 31.9 IN ADULT: ICD-10-CM

## 2025-09-03 DIAGNOSIS — E66.811 CLASS 1 OBESITY DUE TO EXCESS CALORIES WITH SERIOUS COMORBIDITY AND BODY MASS INDEX (BMI) OF 31.0 TO 31.9 IN ADULT: ICD-10-CM

## 2025-09-03 DIAGNOSIS — Q24.5 ALCAPA (ANOMALOUS LEFT CORONARY ARTERY FROM THE PULMONARY ARTERY): ICD-10-CM

## 2025-09-03 DIAGNOSIS — I25.5 ISCHEMIC CARDIOMYOPATHY: ICD-10-CM

## (undated) DEVICE — CAST PADDING 6" UNSTERILE 9046

## (undated) DEVICE — SOLUTION WOUND CLEANSING 3/4OZ 10% PVP EA-L3011FB-50

## (undated) DEVICE — DRSG GAUZE 4X4" 3033

## (undated) DEVICE — SOL NACL 0.9% IRRIG 1000ML BOTTLE 2F7124

## (undated) DEVICE — SU VICRYL 2-0 CT 36" J957H

## (undated) DEVICE — SYR 30ML LL W/O NDL 302832

## (undated) DEVICE — GLOVE PROTEXIS W/NEU-THERA 8.0  2D73TE80

## (undated) DEVICE — KIT HAND CONTROL ANGIOTOUCH ACIST 65CM AT-P65

## (undated) DEVICE — DRSG KERLIX FLUFFS X5

## (undated) DEVICE — GLOVE PROTEXIS POWDER FREE 7.0 ORTHOPEDIC 2D73ET70

## (undated) DEVICE — DRSG AQUACEL AG 3.5X13.75" HYDROFIBER 412012

## (undated) DEVICE — GLOVE PROTEXIS BLUE W/NEU-THERA 8.0  2D73EB80

## (undated) DEVICE — DRSG ADAPTIC 3X8" 6113

## (undated) DEVICE — SLEEVE TR BAND RADIAL COMPRESSION DEVICE 24CM TRB24-REG

## (undated) DEVICE — SYR ANGIOGRAPHY MULTIUSE KIT ACIST 014612

## (undated) DEVICE — MANIFOLD KIT ANGIO AUTOMATED 014613

## (undated) DEVICE — DRAPE IOBAN INCISE 23X17" 6650EZ

## (undated) DEVICE — GLOVE PROTEXIS POWDER FREE 7.5 ORTHOPEDIC 2D73ET75

## (undated) DEVICE — INTRO SHEATH 7FRX10CM PINNACLE RSS702

## (undated) DEVICE — BLADE KNIFE SURG 11 371111

## (undated) DEVICE — CAST PADDING 4" COTTON WEBRIL UNSTERILE 9084

## (undated) DEVICE — WIRE GUIDE 0.035"X260CM SAFE-T-J EXCHANGE G00517

## (undated) DEVICE — CAST PADDING 6" STERILE 9046S

## (undated) DEVICE — LINEN TOWEL PACK X5 5464

## (undated) DEVICE — DRSG STERI STRIP 1/2X4" R1547

## (undated) DEVICE — PREP CHLORAPREP 26ML TINTED ORANGE  260815

## (undated) DEVICE — DRAPE STERI TOWEL LG 1010

## (undated) DEVICE — BLADE PRECISION 25X1.27X9 6725127090

## (undated) DEVICE — PACK TOTAL KNEE SOP15TKFSD

## (undated) DEVICE — WRAP EZY KNEE

## (undated) DEVICE — BONE CLEANING TIP INTERPULSE  0210-010-000

## (undated) DEVICE — DEFIB PRO-PADZ LVP LQD GEL ADULT 8900-2105-01

## (undated) DEVICE — SUCTION IRR SYSTEM W/O TIP INTERPULSE HANDPIECE 0210-100-000

## (undated) DEVICE — BLADE SAW SAGITTAL STRK 18X90X1.27MM HD SYS 6 6118-127-090

## (undated) DEVICE — GLOVE PROTEXIS BLUE W/NEU-THERA 7.0  2D73EB70

## (undated) DEVICE — HOOD FLYTE W/PEELAWAY 408-800-100

## (undated) DEVICE — INTRO GLIDESHEATH SLENDER 6FR 10X45CM 60-1060

## (undated) DEVICE — NDL 21GA 1.5"

## (undated) DEVICE — SU VICRYL 1 CTX CR 8X18" J765D

## (undated) DEVICE — DRAPE STERI U 1015

## (undated) DEVICE — SOL BENZOIN 0.5OZ

## (undated) DEVICE — Device

## (undated) DEVICE — BLADE CLIPPER 4412A

## (undated) DEVICE — CATH DIAG 4FR ANG PIG 538453S

## (undated) DEVICE — ESU GROUND PAD UNIVERSAL W/O CORD

## (undated) DEVICE — BONE CEMENT MIXEVAC HI VAC W/CARTRIDGE 0306-563-000

## (undated) DEVICE — SOL NACL 0.9% IRRIG 3000ML BAG 2B7477

## (undated) DEVICE — CATH ANGIO INFINITI IM 4FRX100CM 538460

## (undated) DEVICE — TOTE ANGIO CORP PC15AT SAN32CC83O

## (undated) DEVICE — SOL WATER IRRIG 1000ML BOTTLE 2F7114

## (undated) DEVICE — INTRODUCER CATH VASC 5FRX10CM  MPIS-501-NT-U-SST

## (undated) DEVICE — MANIFOLD NEPTUNE 4 PORT 700-20

## (undated) DEVICE — GLOVE PROTEXIS W/NEU-THERA 7.0  2D73TE70

## (undated) DEVICE — CATH ANGIO INFINITI JR4 4FRX100CM 538421

## (undated) DEVICE — CATH ANGIO INFINITI JL4 4FRX100CM 538420

## (undated) RX ORDER — VERAPAMIL HYDROCHLORIDE 2.5 MG/ML
INJECTION, SOLUTION INTRAVENOUS
Status: DISPENSED
Start: 2019-03-07

## (undated) RX ORDER — ONDANSETRON 2 MG/ML
INJECTION INTRAMUSCULAR; INTRAVENOUS
Status: DISPENSED
Start: 2021-05-23

## (undated) RX ORDER — CEFAZOLIN SODIUM 1 G/3ML
INJECTION, POWDER, FOR SOLUTION INTRAMUSCULAR; INTRAVENOUS
Status: DISPENSED
Start: 2021-05-23

## (undated) RX ORDER — NITROGLYCERIN 5 MG/ML
VIAL (ML) INTRAVENOUS
Status: DISPENSED
Start: 2019-03-07

## (undated) RX ORDER — LIDOCAINE HYDROCHLORIDE 10 MG/ML
INJECTION, SOLUTION EPIDURAL; INFILTRATION; INTRACAUDAL; PERINEURAL
Status: DISPENSED
Start: 2019-03-07

## (undated) RX ORDER — LIDOCAINE HYDROCHLORIDE 20 MG/ML
INJECTION, SOLUTION EPIDURAL; INFILTRATION; INTRACAUDAL; PERINEURAL
Status: DISPENSED
Start: 2021-05-23

## (undated) RX ORDER — LIDOCAINE HYDROCHLORIDE 20 MG/ML
INJECTION, SOLUTION EPIDURAL; INFILTRATION; INTRACAUDAL; PERINEURAL
Status: DISPENSED
Start: 2021-05-24

## (undated) RX ORDER — PROPOFOL 10 MG/ML
INJECTION, EMULSION INTRAVENOUS
Status: DISPENSED
Start: 2021-05-23

## (undated) RX ORDER — FENTANYL CITRATE 50 UG/ML
INJECTION, SOLUTION INTRAMUSCULAR; INTRAVENOUS
Status: DISPENSED
Start: 2021-05-23

## (undated) RX ORDER — CEFAZOLIN SODIUM 2 G/100ML
INJECTION, SOLUTION INTRAVENOUS
Status: DISPENSED
Start: 2021-05-24

## (undated) RX ORDER — FENTANYL CITRATE 50 UG/ML
INJECTION, SOLUTION INTRAMUSCULAR; INTRAVENOUS
Status: DISPENSED
Start: 2021-05-24

## (undated) RX ORDER — ACETAMINOPHEN 325 MG/1
TABLET ORAL
Status: DISPENSED
Start: 2021-05-24

## (undated) RX ORDER — ONDANSETRON 2 MG/ML
INJECTION INTRAMUSCULAR; INTRAVENOUS
Status: DISPENSED
Start: 2021-05-24

## (undated) RX ORDER — HYDROMORPHONE HYDROCHLORIDE 1 MG/ML
INJECTION, SOLUTION INTRAMUSCULAR; INTRAVENOUS; SUBCUTANEOUS
Status: DISPENSED
Start: 2021-05-24

## (undated) RX ORDER — HEPARIN SODIUM 1000 [USP'U]/ML
INJECTION, SOLUTION INTRAVENOUS; SUBCUTANEOUS
Status: DISPENSED
Start: 2019-03-07

## (undated) RX ORDER — PROPOFOL 10 MG/ML
INJECTION, EMULSION INTRAVENOUS
Status: DISPENSED
Start: 2021-05-24

## (undated) RX ORDER — NITROGLYCERIN 0.4 MG/1
TABLET SUBLINGUAL
Status: DISPENSED
Start: 2019-01-23

## (undated) RX ORDER — FENTANYL CITRATE 50 UG/ML
INJECTION, SOLUTION INTRAMUSCULAR; INTRAVENOUS
Status: DISPENSED
Start: 2019-03-07

## (undated) RX ORDER — DEXAMETHASONE SODIUM PHOSPHATE 4 MG/ML
INJECTION, SOLUTION INTRA-ARTICULAR; INTRALESIONAL; INTRAMUSCULAR; INTRAVENOUS; SOFT TISSUE
Status: DISPENSED
Start: 2021-05-24

## (undated) RX ORDER — METOPROLOL TARTRATE 50 MG
TABLET ORAL
Status: DISPENSED
Start: 2019-01-23

## (undated) RX ORDER — TRANEXAMIC ACID 650 MG/1
TABLET ORAL
Status: DISPENSED
Start: 2021-05-24

## (undated) RX ORDER — DEXAMETHASONE SODIUM PHOSPHATE 4 MG/ML
INJECTION, SOLUTION INTRA-ARTICULAR; INTRALESIONAL; INTRAMUSCULAR; INTRAVENOUS; SOFT TISSUE
Status: DISPENSED
Start: 2021-05-23